# Patient Record
Sex: MALE | Race: WHITE | NOT HISPANIC OR LATINO | Employment: OTHER | URBAN - METROPOLITAN AREA
[De-identification: names, ages, dates, MRNs, and addresses within clinical notes are randomized per-mention and may not be internally consistent; named-entity substitution may affect disease eponyms.]

---

## 2017-03-14 ENCOUNTER — OFFICE VISIT (OUTPATIENT)
Dept: UROLOGY | Facility: CLINIC | Age: 62
End: 2017-03-14
Payer: COMMERCIAL

## 2017-03-14 VITALS
HEIGHT: 71 IN | BODY MASS INDEX: 33.73 KG/M2 | SYSTOLIC BLOOD PRESSURE: 127 MMHG | DIASTOLIC BLOOD PRESSURE: 86 MMHG | WEIGHT: 240.94 LBS | HEART RATE: 55 BPM

## 2017-03-14 DIAGNOSIS — N32.81 OAB (OVERACTIVE BLADDER): ICD-10-CM

## 2017-03-14 DIAGNOSIS — N40.1 BENIGN NON-NODULAR PROSTATIC HYPERPLASIA WITH LOWER URINARY TRACT SYMPTOMS: ICD-10-CM

## 2017-03-14 DIAGNOSIS — R33.9 INCOMPLETE BLADDER EMPTYING: ICD-10-CM

## 2017-03-14 DIAGNOSIS — R35.0 URINARY FREQUENCY: Primary | ICD-10-CM

## 2017-03-14 LAB
BILIRUB SERPL-MCNC: NORMAL MG/DL
BLOOD URINE, POC: NORMAL
COLOR, POC UA: YELLOW
GLUCOSE UR QL STRIP: NORMAL
KETONES UR QL STRIP: NORMAL
LEUKOCYTE ESTERASE URINE, POC: NORMAL
NITRITE, POC UA: NORMAL
PH, POC UA: 6
PROTEIN, POC: NORMAL
SPECIFIC GRAVITY, POC UA: 1.01
UROBILINOGEN, POC UA: NORMAL

## 2017-03-14 PROCEDURE — 99213 OFFICE O/P EST LOW 20 MIN: CPT | Mod: 25,S$GLB,, | Performed by: UROLOGY

## 2017-03-14 PROCEDURE — 3074F SYST BP LT 130 MM HG: CPT | Mod: S$GLB,,, | Performed by: UROLOGY

## 2017-03-14 PROCEDURE — 1160F RVW MEDS BY RX/DR IN RCRD: CPT | Mod: S$GLB,,, | Performed by: UROLOGY

## 2017-03-14 PROCEDURE — 51798 US URINE CAPACITY MEASURE: CPT | Mod: S$GLB,,, | Performed by: UROLOGY

## 2017-03-14 PROCEDURE — 81002 URINALYSIS NONAUTO W/O SCOPE: CPT | Mod: S$GLB,,, | Performed by: UROLOGY

## 2017-03-14 PROCEDURE — 99999 PR PBB SHADOW E&M-EST. PATIENT-LVL II: CPT | Mod: PBBFAC,,, | Performed by: UROLOGY

## 2017-03-14 PROCEDURE — 3079F DIAST BP 80-89 MM HG: CPT | Mod: S$GLB,,, | Performed by: UROLOGY

## 2017-03-14 RX ORDER — CLINDAMYCIN HYDROCHLORIDE 300 MG/1
CAPSULE ORAL
Refills: 0 | COMMUNITY
Start: 2017-03-06 | End: 2017-09-12

## 2017-03-14 RX ORDER — OXYBUTYNIN CHLORIDE 10 MG/1
10 TABLET, EXTENDED RELEASE ORAL DAILY
Qty: 90 TABLET | Refills: 3 | Status: SHIPPED | OUTPATIENT
Start: 2017-03-14 | End: 2017-09-12 | Stop reason: SDUPTHER

## 2017-03-14 NOTE — PROGRESS NOTES
UROLOGY [place]  3 14 17    c-c urinary frequency and urgency    Age 61, visits today with the concern that he is still having some voiding issues.    He started on flomax when he came here three months ago, and feels he has gotten some benefit from it.  His voiding was often and with low volumes, and the stream was somewhat diminished.  We refrained from giving him a bladder relaxant, since his residual was slightly elevated at 236 ml.      BLADDERSCAN ULTRASOUND today 14 ml        IMP  Urinary frequency and urgency, nocturia  Overactive bladder  Sensation of incomplete bladder emptying, but with excellent emptying  bph  I will keep him on the flomax, and I am starting today on ditropan xl 10 mg po qd    RTC 6 mo    If doesn't do well may need cysto, uroflow, repeat bladderscan  Might need proscar

## 2017-04-26 ENCOUNTER — TELEPHONE (OUTPATIENT)
Dept: NEUROSURGERY | Facility: CLINIC | Age: 62
End: 2017-04-26

## 2017-05-22 ENCOUNTER — TELEPHONE (OUTPATIENT)
Dept: FAMILY MEDICINE | Facility: CLINIC | Age: 62
End: 2017-05-22

## 2017-05-22 NOTE — TELEPHONE ENCOUNTER
"----- Message from Yue Mendoza sent at 5/22/2017 12:24 PM CDT -----  Contact: bianca   Needs referral and imaging for "grinding in neck"  Margaret   Call back    "

## 2017-05-22 NOTE — TELEPHONE ENCOUNTER
Attempted to reach patient- no answer- patient will need to be seen by provider- he has not been seen by Dr Ramsay for almost a year. Dr Ramsay is out this week but Opal the NP has openings. Needs to be scheduled.

## 2017-09-12 ENCOUNTER — OFFICE VISIT (OUTPATIENT)
Dept: UROLOGY | Facility: CLINIC | Age: 62
End: 2017-09-12
Payer: COMMERCIAL

## 2017-09-12 VITALS
HEART RATE: 65 BPM | DIASTOLIC BLOOD PRESSURE: 86 MMHG | WEIGHT: 233.69 LBS | BODY MASS INDEX: 32.72 KG/M2 | SYSTOLIC BLOOD PRESSURE: 120 MMHG | HEIGHT: 71 IN

## 2017-09-12 DIAGNOSIS — N40.1 BENIGN NON-NODULAR PROSTATIC HYPERPLASIA WITH LOWER URINARY TRACT SYMPTOMS: ICD-10-CM

## 2017-09-12 DIAGNOSIS — N32.81 OAB (OVERACTIVE BLADDER): ICD-10-CM

## 2017-09-12 DIAGNOSIS — R35.0 URINARY FREQUENCY: ICD-10-CM

## 2017-09-12 DIAGNOSIS — N40.0 BENIGN PROSTATIC HYPERPLASIA, PRESENCE OF LOWER URINARY TRACT SYMPTOMS UNSPECIFIED: Primary | ICD-10-CM

## 2017-09-12 LAB
BILIRUB SERPL-MCNC: NORMAL MG/DL
BLOOD URINE, POC: NORMAL
COLOR, POC UA: YELLOW
GLUCOSE UR QL STRIP: NORMAL
KETONES UR QL STRIP: NORMAL
LEUKOCYTE ESTERASE URINE, POC: NORMAL
NITRITE, POC UA: NORMAL
PH, POC UA: 5
PROTEIN, POC: NORMAL
SPECIFIC GRAVITY, POC UA: 1.02
UROBILINOGEN, POC UA: NORMAL

## 2017-09-12 PROCEDURE — 99214 OFFICE O/P EST MOD 30 MIN: CPT | Mod: 25,S$GLB,, | Performed by: UROLOGY

## 2017-09-12 PROCEDURE — 3079F DIAST BP 80-89 MM HG: CPT | Mod: S$GLB,,, | Performed by: UROLOGY

## 2017-09-12 PROCEDURE — 81002 URINALYSIS NONAUTO W/O SCOPE: CPT | Mod: S$GLB,,, | Performed by: UROLOGY

## 2017-09-12 PROCEDURE — 3008F BODY MASS INDEX DOCD: CPT | Mod: S$GLB,,, | Performed by: UROLOGY

## 2017-09-12 PROCEDURE — 99999 PR PBB SHADOW E&M-EST. PATIENT-LVL III: CPT | Mod: PBBFAC,,, | Performed by: UROLOGY

## 2017-09-12 PROCEDURE — 3074F SYST BP LT 130 MM HG: CPT | Mod: S$GLB,,, | Performed by: UROLOGY

## 2017-09-12 RX ORDER — OXYBUTYNIN CHLORIDE 10 MG/1
10 TABLET, EXTENDED RELEASE ORAL DAILY
Qty: 30 TABLET | Refills: 11 | Status: SHIPPED | OUTPATIENT
Start: 2017-09-12 | End: 2018-06-20

## 2017-09-12 RX ORDER — TAMSULOSIN HYDROCHLORIDE 0.4 MG/1
0.4 CAPSULE ORAL DAILY
Qty: 30 CAPSULE | Refills: 11 | Status: SHIPPED | OUTPATIENT
Start: 2017-09-12 | End: 2017-12-11 | Stop reason: SDUPTHER

## 2017-09-12 NOTE — PROGRESS NOTES
UROLOGY [place]  9 12 17    Urinalysis: col yellow, sg 20, pH 5, leuco -, nitrites -, prot -, glucose -, bili -, blood -    c-c urinary frequency    Age 62, comes in to follow up on urinary frequency. Pt says that since he was put on ditropan 10 mg xl daily the urgency he had has gone away and now he can void with better control. Nocturia x 0-1. No pains or burning on voiding.     Health otherwise fine, with some chronic back problems.         PMH     Surgical:  has a past surgical history that includes Gastric bypass; Liposuction; Cataract extraction w/  intraocular lens implant; Eye surgery; Colonoscopy w/ polypectomy (9/26/2008  [facility]); and Vasectomy.     Medical:  has a past medical history of Arthritis; Colon polyps; and Hypertension.     Familial: father with diabetes, mother with hypertension     Social: lives in [place]. Pt is a contractor            Current Outpatient Prescriptions on File Prior to Visit   Medication Sig Dispense Refill    aspirin 81 MG Chew Take 81 mg by mouth once daily.        diltiazem (CARDIZEM) 120 MG tablet Take 1 tablet (120 mg total) by mouth every 12 (twelve) hours. 180 tablet 3    multivitamin capsule Take 1 capsule by mouth once daily.          Pt alert, oriented, cooperative, no distress  HEENT: wnl.  Neck: supple, no JVD, no lymphadenopathy  Chest: CV NSR  Lungs: normal auscultation  Abdomen flat, nontender, no organomegaly, no masses.  No hernias  Penis noncircumcised, meatus nl  Scrotum slightly distended, bilateral hydroceles, testes palpable and normal  VIDHI: anus nl, sphincter nl tone, mucosa without lesions, prostate 30 gm, symmetric, no nodules or indurations  Extremities: no edema, peripheral pulses nl  Neuro: preserved        IMP  Urinary frequency and urgency, nocturia, well controlled with ditropan xl  bph doing well on flomax  Can be followed yearly

## 2017-12-07 DIAGNOSIS — N40.1 BENIGN NON-NODULAR PROSTATIC HYPERPLASIA WITH LOWER URINARY TRACT SYMPTOMS: ICD-10-CM

## 2017-12-11 DIAGNOSIS — N40.1 BENIGN NON-NODULAR PROSTATIC HYPERPLASIA WITH LOWER URINARY TRACT SYMPTOMS: ICD-10-CM

## 2017-12-11 RX ORDER — TAMSULOSIN HYDROCHLORIDE 0.4 MG/1
0.4 CAPSULE ORAL DAILY
Qty: 30 CAPSULE | Refills: 11 | Status: SHIPPED | OUTPATIENT
Start: 2017-12-11 | End: 2018-06-20

## 2017-12-11 RX ORDER — TAMSULOSIN HYDROCHLORIDE 0.4 MG/1
CAPSULE ORAL
Qty: 90 CAPSULE | Refills: 3 | Status: SHIPPED | OUTPATIENT
Start: 2017-12-11 | End: 2017-12-11

## 2017-12-20 ENCOUNTER — OFFICE VISIT (OUTPATIENT)
Dept: FAMILY MEDICINE | Facility: CLINIC | Age: 62
End: 2017-12-20
Payer: COMMERCIAL

## 2017-12-20 VITALS
OXYGEN SATURATION: 97 % | HEIGHT: 71 IN | TEMPERATURE: 99 F | DIASTOLIC BLOOD PRESSURE: 88 MMHG | BODY MASS INDEX: 34.38 KG/M2 | WEIGHT: 245.56 LBS | HEART RATE: 75 BPM | SYSTOLIC BLOOD PRESSURE: 126 MMHG

## 2017-12-20 DIAGNOSIS — R79.89 LOW SERUM LUTEINIZING HORMONE (LH): ICD-10-CM

## 2017-12-20 DIAGNOSIS — I10 ESSENTIAL HYPERTENSION: ICD-10-CM

## 2017-12-20 DIAGNOSIS — Z00.00 ROUTINE MEDICAL EXAM: Primary | ICD-10-CM

## 2017-12-20 PROCEDURE — 99999 PR PBB SHADOW E&M-EST. PATIENT-LVL III: CPT | Mod: PBBFAC,,, | Performed by: FAMILY MEDICINE

## 2017-12-20 PROCEDURE — 99396 PREV VISIT EST AGE 40-64: CPT | Mod: S$GLB,,, | Performed by: FAMILY MEDICINE

## 2017-12-20 RX ORDER — TESTOSTERONE CYPIONATE 200 MG/ML
140 INJECTION, SOLUTION INTRAMUSCULAR
Qty: 10 ML | Refills: 5
Start: 2017-12-20 | End: 2017-12-20 | Stop reason: SDUPTHER

## 2017-12-20 RX ORDER — METFORMIN HYDROCHLORIDE 500 MG/1
500 TABLET ORAL
Qty: 90 TABLET | Refills: 3
Start: 2017-12-20 | End: 2018-06-20

## 2017-12-20 RX ORDER — TESTOSTERONE CYPIONATE 200 MG/ML
140 INJECTION, SOLUTION INTRAMUSCULAR
Qty: 10 ML | Refills: 5
Start: 2017-12-20 | End: 2018-11-25

## 2017-12-20 NOTE — PROGRESS NOTES
Subjective:       Patient ID: Tex Pickard III is a 62 y.o. male.    Chief Complaint: Annual Exam    HPI     Here for a check up.    Attending a [facility] medical clinic and had labs drawn in 10/2017.  Had a low testosterone of 191 with a low LH of 3.2.  He was started on testosterone injections in November and taking 0.7 ml of 200 mg/ml every week.  Feels better. No more erectile dysfunction.      Also, was found to have an a1c of 5.8%. Started on metformin.     No headaches.  No tunnel vision or blurry vision.       Review of Systems      Review of Systems   Constitutional: Negative for fever and chills.   HENT: Negative for hearing loss and neck stiffness.    Eyes: Negative for redness and itching.   Respiratory: Negative for cough and choking.    Cardiovascular: Negative for chest pain and leg swelling.  Abdomen: Negative for abdominal pain and blood in stool.   Genitourinary: Negative for dysuria and flank pain.   Musculoskeletal: Negative for back pain and gait problem.   Neurological: Negative for light-headedness and headaches.   Hematological: Negative for adenopathy.   Psychiatric/Behavioral: Negative for behavioral problems.     Objective:      Physical Exam   Constitutional: He is oriented to person, place, and time. He appears well-developed and well-nourished.   HENT:   Head: Normocephalic and atraumatic.   Eyes: Conjunctivae are normal. Pupils are equal, round, and reactive to light.   Neck: Normal range of motion. Neck supple.   Cardiovascular: Normal rate, regular rhythm and normal heart sounds.  Exam reveals no friction rub.    No murmur heard.  Pulmonary/Chest: Effort normal and breath sounds normal.   Abdominal: Soft. Bowel sounds are normal. There is no tenderness.   Musculoskeletal: Normal range of motion.   Lymphadenopathy:     He has no cervical adenopathy.   Neurological: He is alert and oriented to person, place, and time. He has normal reflexes. No cranial nerve deficit. Coordination  normal.   Skin: Skin is warm and dry.   Psychiatric: He has a normal mood and affect. His behavior is normal.       Assessment:       1. Routine medical exam    2. Low serum luteinizing hormone (LH)    3. Essential hypertension        Plan:       Routine medical exam  -     Comprehensive metabolic panel; Future; Expected date: 12/20/2017  -     Lipid panel; Future; Expected date: 12/20/2017    Low serum luteinizing hormone (LH)  -     Prolactin; Future; Expected date: 12/20/2017  -     MRI Brain W WO Contrast; Future    Essential hypertension    Other orders  -     metFORMIN (GLUCOPHAGE) 500 MG tablet; Take 1 tablet (500 mg total) by mouth daily with breakfast.  Dispense: 90 tablet; Refill: 3  -     testosterone cypionate (DEPOTESTOTERONE CYPIONATE) 200 mg/mL injection; Inject 0.7 mLs (140 mg total) into the muscle every 14 (fourteen) days.  Dispense: 10 mL; Refill: 5      Plan:  See orders. R/o prolactinoma  Cont current meds        Medication List with Changes/Refills   New Medications    METFORMIN (GLUCOPHAGE) 500 MG TABLET    Take 1 tablet (500 mg total) by mouth daily with breakfast.    TESTOSTERONE CYPIONATE (DEPOTESTOTERONE CYPIONATE) 200 MG/ML INJECTION    Inject 0.7 mLs (140 mg total) into the muscle every 14 (fourteen) days.   Current Medications    ASPIRIN 81 MG CHEW    Take 81 mg by mouth once daily.    DILTIAZEM (CARDIZEM) 120 MG TABLET    Take 1 tablet (120 mg total) by mouth every 12 (twelve) hours.    MULTIVITAMIN CAPSULE    Take 1 capsule by mouth once daily.    OXYBUTYNIN (DITROPAN-XL) 10 MG 24 HR TABLET    Take 1 tablet (10 mg total) by mouth once daily.    OXYMETAZOLINE (AFRIN) 0.05 % NASAL SPRAY    2 sprays by Nasal route every evening.    TAMSULOSIN (FLOMAX) 0.4 MG CP24    Take 1 capsule (0.4 mg total) by mouth once daily.

## 2017-12-21 ENCOUNTER — LAB VISIT (OUTPATIENT)
Dept: LAB | Facility: HOSPITAL | Age: 62
End: 2017-12-21
Attending: FAMILY MEDICINE
Payer: COMMERCIAL

## 2017-12-21 DIAGNOSIS — R79.89 LOW SERUM LUTEINIZING HORMONE (LH): ICD-10-CM

## 2017-12-21 DIAGNOSIS — Z00.00 ROUTINE MEDICAL EXAM: ICD-10-CM

## 2017-12-21 LAB
ALBUMIN SERPL BCP-MCNC: 4 G/DL
ALP SERPL-CCNC: 70 U/L
ALT SERPL W/O P-5'-P-CCNC: 24 U/L
ANION GAP SERPL CALC-SCNC: 7 MMOL/L
AST SERPL-CCNC: 19 U/L
BILIRUB SERPL-MCNC: 0.6 MG/DL
BUN SERPL-MCNC: 14 MG/DL
CALCIUM SERPL-MCNC: 8.5 MG/DL
CHLORIDE SERPL-SCNC: 105 MMOL/L
CHOLEST SERPL-MCNC: 172 MG/DL
CHOLEST/HDLC SERPL: 4.5 {RATIO}
CO2 SERPL-SCNC: 28 MMOL/L
CREAT SERPL-MCNC: 0.8 MG/DL
EST. GFR  (AFRICAN AMERICAN): >60 ML/MIN/1.73 M^2
EST. GFR  (NON AFRICAN AMERICAN): >60 ML/MIN/1.73 M^2
GLUCOSE SERPL-MCNC: 100 MG/DL
HDLC SERPL-MCNC: 38 MG/DL
HDLC SERPL: 22.1 %
LDLC SERPL CALC-MCNC: 111.2 MG/DL
NONHDLC SERPL-MCNC: 134 MG/DL
POTASSIUM SERPL-SCNC: 4.3 MMOL/L
PROLACTIN SERPL IA-MCNC: 4.4 NG/ML
PROT SERPL-MCNC: 7.4 G/DL
SODIUM SERPL-SCNC: 140 MMOL/L
TRIGL SERPL-MCNC: 114 MG/DL

## 2017-12-21 PROCEDURE — 80053 COMPREHEN METABOLIC PANEL: CPT

## 2017-12-21 PROCEDURE — 80061 LIPID PANEL: CPT

## 2017-12-21 PROCEDURE — 84146 ASSAY OF PROLACTIN: CPT

## 2017-12-21 PROCEDURE — 36415 COLL VENOUS BLD VENIPUNCTURE: CPT | Mod: PO

## 2017-12-22 ENCOUNTER — TELEPHONE (OUTPATIENT)
Dept: FAMILY MEDICINE | Facility: CLINIC | Age: 62
End: 2017-12-22

## 2017-12-22 NOTE — TELEPHONE ENCOUNTER
inform pt via phone that I reviewed the test results and note the following:    Your Comprehensive Metabolic Panel which includes electrolytes, Kidney Test, Blood Sugar, and Liver test was reviewed. All results within acceptable range.     Regarding your cholesterol profile, your total cholesterol, LDL(lousy cholesterol that causes plaques), HDL(healthy cholesterol that removes LDL) and triglycerides(when elevated, a risk factor of heart disease and associated with high LDL levels) were reviewed. All results within acceptable range.     Prolactin level was normal.

## 2018-01-08 ENCOUNTER — HOSPITAL ENCOUNTER (OUTPATIENT)
Dept: RADIOLOGY | Facility: HOSPITAL | Age: 63
Discharge: HOME | End: 2018-01-08
Attending: FAMILY MEDICINE
Payer: COMMERCIAL

## 2018-01-08 DIAGNOSIS — R79.89 LOW SERUM LUTEINIZING HORMONE (LH): ICD-10-CM

## 2018-01-08 PROCEDURE — 70553 MRI BRAIN STEM W/O & W/DYE: CPT | Mod: TC,PO

## 2018-01-08 PROCEDURE — 70553 MRI BRAIN STEM W/O & W/DYE: CPT | Mod: 26,,, | Performed by: RADIOLOGY

## 2018-01-08 PROCEDURE — 25500020 PHARM REV CODE 255: Mod: PO | Performed by: FAMILY MEDICINE

## 2018-01-08 PROCEDURE — A9585 GADOBUTROL INJECTION: HCPCS | Mod: PO | Performed by: FAMILY MEDICINE

## 2018-01-08 RX ORDER — GADOBUTROL 604.72 MG/ML
10 INJECTION INTRAVENOUS
Status: COMPLETED | OUTPATIENT
Start: 2018-01-08 | End: 2018-01-08

## 2018-01-08 RX ADMIN — GADOBUTROL 10 ML: 604.72 INJECTION INTRAVENOUS at 03:01

## 2018-02-10 RX ORDER — DILTIAZEM HYDROCHLORIDE 120 MG/1
TABLET, FILM COATED ORAL
Qty: 180 TABLET | Refills: 0 | Status: SHIPPED | OUTPATIENT
Start: 2018-02-10 | End: 2018-05-12 | Stop reason: SDUPTHER

## 2018-03-07 DIAGNOSIS — N32.81 OAB (OVERACTIVE BLADDER): ICD-10-CM

## 2018-03-07 DIAGNOSIS — R35.0 URINARY FREQUENCY: ICD-10-CM

## 2018-03-07 RX ORDER — OXYBUTYNIN CHLORIDE 10 MG/1
TABLET, EXTENDED RELEASE ORAL
Qty: 90 TABLET | Refills: 3 | Status: SHIPPED | OUTPATIENT
Start: 2018-03-07 | End: 2018-06-20

## 2018-05-13 RX ORDER — DILTIAZEM HYDROCHLORIDE 120 MG/1
TABLET, FILM COATED ORAL
Qty: 180 TABLET | Refills: 3 | Status: SHIPPED | OUTPATIENT
Start: 2018-05-13 | End: 2019-01-16 | Stop reason: SDUPTHER

## 2018-06-20 ENCOUNTER — OFFICE VISIT (OUTPATIENT)
Dept: FAMILY MEDICINE | Facility: CLINIC | Age: 63
End: 2018-06-20
Payer: COMMERCIAL

## 2018-06-20 VITALS
RESPIRATION RATE: 12 BRPM | BODY MASS INDEX: 34.63 KG/M2 | HEART RATE: 64 BPM | HEIGHT: 71 IN | DIASTOLIC BLOOD PRESSURE: 78 MMHG | WEIGHT: 247.38 LBS | SYSTOLIC BLOOD PRESSURE: 126 MMHG

## 2018-06-20 DIAGNOSIS — E34.9 TESTOSTERONE DEFICIENCY: ICD-10-CM

## 2018-06-20 DIAGNOSIS — E78.5 HYPERLIPIDEMIA, UNSPECIFIED HYPERLIPIDEMIA TYPE: Primary | ICD-10-CM

## 2018-06-20 PROCEDURE — 99999 PR PBB SHADOW E&M-EST. PATIENT-LVL III: CPT | Mod: PBBFAC,,, | Performed by: FAMILY MEDICINE

## 2018-06-20 PROCEDURE — 3078F DIAST BP <80 MM HG: CPT | Mod: CPTII,S$GLB,, | Performed by: FAMILY MEDICINE

## 2018-06-20 PROCEDURE — 3008F BODY MASS INDEX DOCD: CPT | Mod: CPTII,S$GLB,, | Performed by: FAMILY MEDICINE

## 2018-06-20 PROCEDURE — 99214 OFFICE O/P EST MOD 30 MIN: CPT | Mod: S$GLB,,, | Performed by: FAMILY MEDICINE

## 2018-06-20 PROCEDURE — 3074F SYST BP LT 130 MM HG: CPT | Mod: CPTII,S$GLB,, | Performed by: FAMILY MEDICINE

## 2018-06-20 NOTE — PROGRESS NOTES
Subjective:       Patient ID: Tex Pickard III is a 62 y.o. male.    Chief Complaint: Hypertension (f/u)    HPI     Here for a f/u.    htn controlled.    Attending a [facility] medical clinic and receiving testosterone and hcg.  Reports that he had his testosterone checked last week, Thursday.  Gives himself the testosterone injection weekly.       Review of Systems      Review of Systems   Constitutional: Negative for fever and chills.   HENT: Negative for hearing loss and neck stiffness.    Eyes: Negative for redness and itching.   Respiratory: Negative for cough and choking.    Cardiovascular: Negative for chest pain and leg swelling.  Abdomen: Negative for abdominal pain and blood in stool.   Genitourinary: Negative for dysuria and flank pain.   Musculoskeletal: Negative for back pain and gait problem.   Neurological: Negative for light-headedness and headaches.   Hematological: Negative for adenopathy.   Psychiatric/Behavioral: Negative for behavioral problems.     Objective:      Physical Exam   HENT:   Head: Atraumatic.   Eyes: Conjunctivae are normal. Pupils are equal, round, and reactive to light.   Neck: Normal range of motion.   Cardiovascular: Normal rate and regular rhythm.    No murmur heard.  Pulmonary/Chest: Effort normal and breath sounds normal. He has no wheezes.   Lymphadenopathy:     He has no cervical adenopathy.       Assessment:       1. Hyperlipidemia, unspecified hyperlipidemia type    2. Testosterone deficiency        Plan:       Hyperlipidemia, unspecified hyperlipidemia type  -     Comprehensive metabolic panel; Future; Expected date: 06/20/2018  -     Lipid panel; Future; Expected date: 06/20/2018    Testosterone deficiency  -     CBC auto differential; Future; Expected date: 06/20/2018  -     Testosterone; Future; Expected date: 06/20/2018          Plan:  See orders  Cont current meds      Medication List with Changes/Refills   Current Medications    ASPIRIN 81 MG CHEW    Take 81 mg by  mouth once daily.    DILTIAZEM (CARDIZEM) 120 MG TABLET    TAKE 1 TABLET BY MOUTH EVERY 12 HOURS    MULTIVITAMIN CAPSULE    Take 1 capsule by mouth once daily.    OXYMETAZOLINE (AFRIN) 0.05 % NASAL SPRAY    2 sprays by Nasal route every evening.    TESTOSTERONE CYPIONATE (DEPOTESTOTERONE CYPIONATE) 200 MG/ML INJECTION    Inject 0.7 mLs (140 mg total) into the muscle every 7 days.    UNABLE TO FIND    HCG (LYO) 6,000 IU INJECTABLE EMP: MIX WITH 6ML OF BACTERIOSTATIC WATER.  INJECT 0.5ML OR 50 UNITS ON AN INSULIN SYRINGE (500 IU OF HCG) SUBCUTANEOUSLY TWICE A WEEK   Discontinued Medications    METFORMIN (GLUCOPHAGE) 500 MG TABLET    Take 1 tablet (500 mg total) by mouth daily with breakfast.    OXYBUTYNIN (DITROPAN-XL) 10 MG 24 HR TABLET    Take 1 tablet (10 mg total) by mouth once daily.    OXYBUTYNIN (DITROPAN-XL) 10 MG 24 HR TABLET    TAKE 1 TABLET(10 MG) BY MOUTH EVERY DAY    TAMSULOSIN (FLOMAX) 0.4 MG CP24    Take 1 capsule (0.4 mg total) by mouth once daily.

## 2018-06-21 ENCOUNTER — LAB VISIT (OUTPATIENT)
Dept: LAB | Facility: HOSPITAL | Age: 63
End: 2018-06-21
Attending: FAMILY MEDICINE
Payer: COMMERCIAL

## 2018-06-21 DIAGNOSIS — E34.9 TESTOSTERONE DEFICIENCY: ICD-10-CM

## 2018-06-21 DIAGNOSIS — E78.5 HYPERLIPIDEMIA, UNSPECIFIED HYPERLIPIDEMIA TYPE: ICD-10-CM

## 2018-06-21 LAB
ALBUMIN SERPL BCP-MCNC: 3.6 G/DL
ALP SERPL-CCNC: 69 U/L
ALT SERPL W/O P-5'-P-CCNC: 21 U/L
ANION GAP SERPL CALC-SCNC: 5 MMOL/L
AST SERPL-CCNC: 19 U/L
BASOPHILS # BLD AUTO: 0.04 K/UL
BASOPHILS NFR BLD: 0.9 %
BILIRUB SERPL-MCNC: 0.7 MG/DL
BUN SERPL-MCNC: 11 MG/DL
CALCIUM SERPL-MCNC: 8.7 MG/DL
CHLORIDE SERPL-SCNC: 105 MMOL/L
CHOLEST SERPL-MCNC: 119 MG/DL
CHOLEST/HDLC SERPL: 3.8 {RATIO}
CO2 SERPL-SCNC: 30 MMOL/L
CREAT SERPL-MCNC: 1 MG/DL
DIFFERENTIAL METHOD: ABNORMAL
EOSINOPHIL # BLD AUTO: 0.2 K/UL
EOSINOPHIL NFR BLD: 4.1 %
ERYTHROCYTE [DISTWIDTH] IN BLOOD BY AUTOMATED COUNT: 14.1 %
EST. GFR  (AFRICAN AMERICAN): >60 ML/MIN/1.73 M^2
EST. GFR  (NON AFRICAN AMERICAN): >60 ML/MIN/1.73 M^2
GLUCOSE SERPL-MCNC: 86 MG/DL
HCT VFR BLD AUTO: 41.7 %
HDLC SERPL-MCNC: 31 MG/DL
HDLC SERPL: 26.1 %
HGB BLD-MCNC: 12.8 G/DL
IMM GRANULOCYTES # BLD AUTO: 0.01 K/UL
IMM GRANULOCYTES NFR BLD AUTO: 0.2 %
LDLC SERPL CALC-MCNC: 72.4 MG/DL
LYMPHOCYTES # BLD AUTO: 1.3 K/UL
LYMPHOCYTES NFR BLD: 30.5 %
MCH RBC QN AUTO: 27.3 PG
MCHC RBC AUTO-ENTMCNC: 30.7 G/DL
MCV RBC AUTO: 89 FL
MONOCYTES # BLD AUTO: 0.4 K/UL
MONOCYTES NFR BLD: 8.5 %
NEUTROPHILS # BLD AUTO: 2.4 K/UL
NEUTROPHILS NFR BLD: 55.8 %
NONHDLC SERPL-MCNC: 88 MG/DL
NRBC BLD-RTO: 0 /100 WBC
PLATELET # BLD AUTO: 185 K/UL
PMV BLD AUTO: 12 FL
POTASSIUM SERPL-SCNC: 4.7 MMOL/L
PROT SERPL-MCNC: 6.6 G/DL
RBC # BLD AUTO: 4.69 M/UL
SODIUM SERPL-SCNC: 140 MMOL/L
TESTOST SERPL-MCNC: 1459 NG/DL
TRIGL SERPL-MCNC: 78 MG/DL
WBC # BLD AUTO: 4.36 K/UL

## 2018-06-21 PROCEDURE — 36415 COLL VENOUS BLD VENIPUNCTURE: CPT | Mod: PO

## 2018-06-21 PROCEDURE — 85025 COMPLETE CBC W/AUTO DIFF WBC: CPT

## 2018-06-21 PROCEDURE — 80053 COMPREHEN METABOLIC PANEL: CPT

## 2018-06-21 PROCEDURE — 80061 LIPID PANEL: CPT

## 2018-06-21 PROCEDURE — 84403 ASSAY OF TOTAL TESTOSTERONE: CPT

## 2018-10-05 ENCOUNTER — TELEPHONE (OUTPATIENT)
Dept: GASTROENTEROLOGY | Facility: CLINIC | Age: 63
End: 2018-10-05

## 2018-10-05 NOTE — TELEPHONE ENCOUNTER
----- Message from Catie Humphreys sent at 10/5/2018  4:13 PM CDT -----  Contact: Self  Patient states he is having some stomach issues and needs to get in to see Dr Martinez  No appts available till December   Please call [phone] (home) [phone] (work)

## 2018-10-17 ENCOUNTER — TELEPHONE (OUTPATIENT)
Dept: GASTROENTEROLOGY | Facility: CLINIC | Age: 63
End: 2018-10-17

## 2018-10-17 NOTE — TELEPHONE ENCOUNTER
----- Message from Demetrius Goins LPN sent at 10/16/2018  3:54 PM CDT -----  This pt needs to be rescheduled with [facility] in [place]. Someone checked him in today in the [place] clinic and the pt thought that was where he was scheduled.  Please reschedule pt for [facility].    Thank you

## 2018-10-22 NOTE — PROGRESS NOTES
Subjective:       Patient ID: Tex Pickard III is a 63 y.o. male.    Chief Complaint: Hospital Follow Up (elevated BP); Medication Management (hospital questioned reason for dilitazem; states metformin is giving him diarrhea); and Flu Vaccine (due )    Mr. Pickard is a new patient to me. He presents today for follow up    HPI   Presented to [facility] ED on 10/21/18 with complaints of dizziness, nausea, headache, and elevated BP at home. EKG showed no acute ischemic abnormalities.  Lab studies fairly unremarkable. Normal kidney function. Normal troponin. BP stabilized in ED. DCd home and advised to FU with our team     BP has been stable at home. No further episodes. Denies recent high salt intake. Has been on diltiazem for BP control for years    Prescribed Metformin by Dr. Weiler for high-end normal X4L---ei history of diabetes or prediabetes. Metformin causing diarrhea   Vitals:    10/23/18 1300   BP: 120/74   Pulse: (!) 55   Temp: 98.2 °F (36.8 °C)     Review of Systems   Constitutional: Negative for diaphoresis and fever.   HENT: Negative for facial swelling and trouble swallowing.    Eyes: Negative for discharge and redness.   Respiratory: Negative for cough and shortness of breath.    Cardiovascular: Negative for chest pain and palpitations.   Gastrointestinal: Negative for abdominal pain and diarrhea.   Genitourinary: Negative for difficulty urinating.   Musculoskeletal: Negative for gait problem.   Skin: Negative for rash and wound.   Neurological: Negative for facial asymmetry and speech difficulty.   Psychiatric/Behavioral: Negative for confusion. The patient is not nervous/anxious.        Past Medical History:   Diagnosis Date    Arthritis     Colon polyps     Hypertension      Objective:      Physical Exam   Constitutional: He is oriented to person, place, and time. He does not have a sickly appearance. No distress.   HENT:   Head: Normocephalic.   Right Ear: Hearing normal.   Left Ear: Hearing normal.    Nose: Nose normal.   Eyes: Conjunctivae and lids are normal.   Neck: No JVD present. No tracheal deviation present.   Cardiovascular: Normal rate and normal heart sounds.   Pulmonary/Chest: Effort normal and breath sounds normal.   Musculoskeletal: Normal range of motion. He exhibits no deformity.   Neurological: He is alert and oriented to person, place, and time.   Skin: He is not diaphoretic. No pallor.   Psychiatric: He has a normal mood and affect. His speech is normal and behavior is normal. Judgment and thought content normal. Cognition and memory are normal.   Nursing note and vitals reviewed.      Assessment:       1. Essential hypertension    2. Screening for diabetes mellitus    3. Testosterone deficiency        Plan:       Essential hypertension  -     Comprehensive metabolic panel; Future; Expected date: 12/10/2018  -     TSH; Future; Expected date: 12/10/2018    Screening for diabetes mellitus  -     Hemoglobin A1c; Future; Expected date: 12/10/2018  - Stop metformin; educated on lifestyle adjustments (diabetic diet, exercise, weight loss)    Testosterone deficiency  -     Testosterone; Future; Expected date: 10/23/2018    Other orders  -     Influenza - Quadrivalent (3 years & older) (PF)    continue checking BP at home  Follow-up in about 8 weeks (around 12/20/2018) for routine FU c PCP as scheduled with fasting labs prior, me as needed.       Medication List           Accurate as of 10/23/18  1:30 PM. If you have any questions, ask your nurse or doctor.               CHANGE how you take these medications    testosterone cypionate 200 mg/mL injection  Commonly known as:  DEPOTESTOTERONE CYPIONATE  Inject 0.7 mLs (140 mg total) into the muscle every 7 days.  What changed:  how much to take        CONTINUE taking these medications    aspirin 81 MG Chew     diltiaZEM 120 MG tablet  Commonly known as:  CARDIZEM  TAKE 1 TABLET BY MOUTH EVERY 12 HOURS     multivitamin capsule     oxymetazoline 0.05 %  nasal spray  Commonly known as:  AFRIN     UNABLE TO FIND        STOP taking these medications    metFORMIN 500 MG tablet  Commonly known as:  GLUCOPHAGE  Stopped by:  Opal Gavin, NP

## 2018-10-23 ENCOUNTER — OFFICE VISIT (OUTPATIENT)
Dept: FAMILY MEDICINE | Facility: CLINIC | Age: 63
End: 2018-10-23
Payer: COMMERCIAL

## 2018-10-23 VITALS
BODY MASS INDEX: 34.35 KG/M2 | HEIGHT: 71 IN | HEART RATE: 55 BPM | OXYGEN SATURATION: 98 % | TEMPERATURE: 98 F | SYSTOLIC BLOOD PRESSURE: 120 MMHG | WEIGHT: 245.38 LBS | DIASTOLIC BLOOD PRESSURE: 74 MMHG

## 2018-10-23 DIAGNOSIS — I10 ESSENTIAL HYPERTENSION: Primary | ICD-10-CM

## 2018-10-23 DIAGNOSIS — E34.9 TESTOSTERONE DEFICIENCY: ICD-10-CM

## 2018-10-23 DIAGNOSIS — Z13.1 SCREENING FOR DIABETES MELLITUS: ICD-10-CM

## 2018-10-23 PROCEDURE — 3078F DIAST BP <80 MM HG: CPT | Mod: CPTII,S$GLB,, | Performed by: NURSE PRACTITIONER

## 2018-10-23 PROCEDURE — 90471 IMMUNIZATION ADMIN: CPT | Mod: S$GLB,,, | Performed by: NURSE PRACTITIONER

## 2018-10-23 PROCEDURE — 90686 IIV4 VACC NO PRSV 0.5 ML IM: CPT | Mod: S$GLB,,, | Performed by: NURSE PRACTITIONER

## 2018-10-23 PROCEDURE — 99214 OFFICE O/P EST MOD 30 MIN: CPT | Mod: 25,S$GLB,, | Performed by: NURSE PRACTITIONER

## 2018-10-23 PROCEDURE — 99999 PR PBB SHADOW E&M-EST. PATIENT-LVL III: CPT | Mod: PBBFAC,,, | Performed by: NURSE PRACTITIONER

## 2018-10-23 PROCEDURE — 3074F SYST BP LT 130 MM HG: CPT | Mod: CPTII,S$GLB,, | Performed by: NURSE PRACTITIONER

## 2018-10-23 PROCEDURE — 3008F BODY MASS INDEX DOCD: CPT | Mod: CPTII,S$GLB,, | Performed by: NURSE PRACTITIONER

## 2018-10-23 RX ORDER — METFORMIN HYDROCHLORIDE 500 MG/1
500 TABLET ORAL
COMMUNITY
End: 2018-10-23 | Stop reason: ALTCHOICE

## 2018-11-19 ENCOUNTER — TELEPHONE (OUTPATIENT)
Dept: FAMILY MEDICINE | Facility: CLINIC | Age: 63
End: 2018-11-19

## 2018-11-19 NOTE — TELEPHONE ENCOUNTER
----- Message from Eliceo Edy sent at 11/19/2018 11:59 AM CST -----  Type:  Sooner Apoointment Request    Caller is requesting a sooner appointment.  Caller declined first available appointment listed below.  Caller will not accept being placed on the waitlist and is requesting a message be sent to doctor.    Name of Caller:  Patient   When is the first available appointment?  12/20/18  Symptoms:  High blood pressure   Best Call Back Number:  [phone]  Additional Information:  Patients blood pressure keeps spiking. Patient cannot wait until the 12/20/18 appointment at 7:40. Patient has been in the ER due to high blood pressure. Please call patient to schedule and advise.

## 2018-11-21 ENCOUNTER — OFFICE VISIT (OUTPATIENT)
Dept: FAMILY MEDICINE | Facility: CLINIC | Age: 63
End: 2018-11-21
Payer: COMMERCIAL

## 2018-11-21 VITALS
WEIGHT: 248.88 LBS | HEIGHT: 71 IN | SYSTOLIC BLOOD PRESSURE: 138 MMHG | HEART RATE: 62 BPM | BODY MASS INDEX: 34.84 KG/M2 | DIASTOLIC BLOOD PRESSURE: 82 MMHG | OXYGEN SATURATION: 98 % | TEMPERATURE: 99 F

## 2018-11-21 DIAGNOSIS — I10 HYPERTENSION, UNSPECIFIED TYPE: Primary | ICD-10-CM

## 2018-11-21 DIAGNOSIS — R73.02 IMPAIRED GLUCOSE TOLERANCE: ICD-10-CM

## 2018-11-21 DIAGNOSIS — E34.9 TESTOSTERONE DEFICIENCY: ICD-10-CM

## 2018-11-21 PROCEDURE — 99999 PR PBB SHADOW E&M-EST. PATIENT-LVL III: CPT | Mod: PBBFAC,,, | Performed by: FAMILY MEDICINE

## 2018-11-21 PROCEDURE — 3075F SYST BP GE 130 - 139MM HG: CPT | Mod: CPTII,S$GLB,, | Performed by: FAMILY MEDICINE

## 2018-11-21 PROCEDURE — 99214 OFFICE O/P EST MOD 30 MIN: CPT | Mod: S$GLB,,, | Performed by: FAMILY MEDICINE

## 2018-11-21 PROCEDURE — 3008F BODY MASS INDEX DOCD: CPT | Mod: CPTII,S$GLB,, | Performed by: FAMILY MEDICINE

## 2018-11-21 PROCEDURE — 3079F DIAST BP 80-89 MM HG: CPT | Mod: CPTII,S$GLB,, | Performed by: FAMILY MEDICINE

## 2018-11-21 RX ORDER — OLMESARTAN MEDOXOMIL 20 MG/1
20 TABLET ORAL DAILY
Qty: 30 TABLET | Refills: 5 | Status: SHIPPED | OUTPATIENT
Start: 2018-11-21 | End: 2019-01-16 | Stop reason: SDUPTHER

## 2018-11-21 NOTE — PROGRESS NOTES
Subjective:       Patient ID: Tex Pickard III is a 63 y.o. male.    Chief Complaint: Hypertension (fluctuating; asking about alternative or change in dosage); Nausea; and Fatigue    HPI     Here with partner.    Intermittent elevated bp over the past 4-6 weeks    Reports increasing his testosterone injections from 0.7 ml(140 mg) from once a week to twice a week over the past 2-3 months. As result, c/o nausea, irritability, high bp and fatigue.        Review of Systems      Review of Systems   Constitutional: Negative for fever and chills.   HENT: Negative for hearing loss and neck stiffness.    Eyes: Negative for redness and itching.   Respiratory: Negative for cough and choking.    Cardiovascular: Negative for chest pain and leg swelling.  Abdomen: Negative for abdominal pain and blood in stool.   Genitourinary: Negative for dysuria and flank pain.   Musculoskeletal: Negative for back pain and gait problem.   Neurological: Negative for light-headedness and headaches.   Hematological: Negative for adenopathy.   Psychiatric/Behavioral: Negative for behavioral problems.     Objective:      Physical Exam   HENT:   Head: Atraumatic.   Eyes: Conjunctivae are normal. Pupils are equal, round, and reactive to light.   Neck: Normal range of motion.   Cardiovascular: Normal rate and regular rhythm.   No murmur heard.  Pulmonary/Chest: Effort normal and breath sounds normal. He has no wheezes.   Lymphadenopathy:     He has no cervical adenopathy.       Assessment:       1. Hypertension, unspecified type    2. Testosterone deficiency    3. Impaired glucose tolerance        Plan:       Hypertension, unspecified type  -     Comprehensive metabolic panel; Future; Expected date: 11/21/2018  -     CBC auto differential; Future; Expected date: 11/21/2018  -     TSH; Future; Expected date: 11/21/2018    Testosterone deficiency  -     Testosterone; Future; Expected date: 11/21/2018    Impaired glucose tolerance  -     Hemoglobin  A1c; Future; Expected date: 11/21/2018    Other orders  -     olmesartan (BENICAR) 20 MG tablet; Take 1 tablet (20 mg total) by mouth once daily.  Dispense: 30 tablet; Refill: 5  -     testosterone cypionate (DEPOTESTOTERONE CYPIONATE) 200 mg/mL injection; Inject 0.7 mLs (140 mg total) into the muscle twice a week.  Dispense: 10 mL; Refill: 5            Plan:  Start benicar for bp control. Serial bp checks  See orders. If testosterone level is elevated, then will recommend that he decrease his dosage.           Medication List           Accurate as of 11/21/18 11:59 PM. If you have any questions, ask your nurse or doctor.               START taking these medications    olmesartan 20 MG tablet  Commonly known as:  BENICAR  Take 1 tablet (20 mg total) by mouth once daily.  Started by:  Robert Ramsay MD        CHANGE how you take these medications    testosterone cypionate 200 mg/mL injection  Commonly known as:  DEPOTESTOTERONE CYPIONATE  Inject 0.7 mLs (140 mg total) into the muscle twice a week.  Start taking on:  11/26/2018  What changed:  when to take this  Changed by:  Robert Ramsay MD        CONTINUE taking these medications    aspirin 81 MG Chew     diltiaZEM 120 MG tablet  Commonly known as:  CARDIZEM  TAKE 1 TABLET BY MOUTH EVERY 12 HOURS     multivitamin capsule     oxymetazoline 0.05 % nasal spray  Commonly known as:  AFRIN     UNABLE TO FIND           Where to Get Your Medications      [facility] [address] [place] [address] AT [facility] [address] [address]    Phone:  [phone]   · olmesartan 20 MG tablet     Information about where to get these medications is not yet available    Ask your nurse or doctor about these medications  · testosterone cypionate 200 mg/mL injection

## 2018-11-25 RX ORDER — TESTOSTERONE CYPIONATE 200 MG/ML
140 INJECTION, SOLUTION INTRAMUSCULAR
Qty: 10 ML | Refills: 5
Start: 2018-11-26 | End: 2018-12-20

## 2018-11-26 ENCOUNTER — LAB VISIT (OUTPATIENT)
Dept: LAB | Facility: HOSPITAL | Age: 63
End: 2018-11-26
Attending: FAMILY MEDICINE
Payer: COMMERCIAL

## 2018-11-26 DIAGNOSIS — I10 ESSENTIAL HYPERTENSION: ICD-10-CM

## 2018-11-26 DIAGNOSIS — Z13.1 SCREENING FOR DIABETES MELLITUS: ICD-10-CM

## 2018-11-26 DIAGNOSIS — I10 HYPERTENSION, UNSPECIFIED TYPE: ICD-10-CM

## 2018-11-26 DIAGNOSIS — E34.9 TESTOSTERONE DEFICIENCY: ICD-10-CM

## 2018-11-26 LAB
ALBUMIN SERPL BCP-MCNC: 3.8 G/DL
ALBUMIN SERPL BCP-MCNC: 3.8 G/DL
ALP SERPL-CCNC: 72 U/L
ALP SERPL-CCNC: 72 U/L
ALT SERPL W/O P-5'-P-CCNC: 21 U/L
ALT SERPL W/O P-5'-P-CCNC: 21 U/L
ANION GAP SERPL CALC-SCNC: 9 MMOL/L
ANION GAP SERPL CALC-SCNC: 9 MMOL/L
AST SERPL-CCNC: 21 U/L
AST SERPL-CCNC: 21 U/L
BASOPHILS # BLD AUTO: 0.05 K/UL
BASOPHILS NFR BLD: 1.1 %
BILIRUB SERPL-MCNC: 0.6 MG/DL
BILIRUB SERPL-MCNC: 0.6 MG/DL
BUN SERPL-MCNC: 10 MG/DL
BUN SERPL-MCNC: 10 MG/DL
CALCIUM SERPL-MCNC: 8.9 MG/DL
CALCIUM SERPL-MCNC: 8.9 MG/DL
CHLORIDE SERPL-SCNC: 104 MMOL/L
CHLORIDE SERPL-SCNC: 104 MMOL/L
CO2 SERPL-SCNC: 27 MMOL/L
CO2 SERPL-SCNC: 27 MMOL/L
CREAT SERPL-MCNC: 1 MG/DL
CREAT SERPL-MCNC: 1 MG/DL
DIFFERENTIAL METHOD: ABNORMAL
EOSINOPHIL # BLD AUTO: 0.3 K/UL
EOSINOPHIL NFR BLD: 5.3 %
ERYTHROCYTE [DISTWIDTH] IN BLOOD BY AUTOMATED COUNT: 14.4 %
EST. GFR  (AFRICAN AMERICAN): >60 ML/MIN/1.73 M^2
EST. GFR  (AFRICAN AMERICAN): >60 ML/MIN/1.73 M^2
EST. GFR  (NON AFRICAN AMERICAN): >60 ML/MIN/1.73 M^2
EST. GFR  (NON AFRICAN AMERICAN): >60 ML/MIN/1.73 M^2
ESTIMATED AVG GLUCOSE: 114 MG/DL
GLUCOSE SERPL-MCNC: 140 MG/DL
GLUCOSE SERPL-MCNC: 140 MG/DL
HBA1C MFR BLD HPLC: 5.6 %
HCT VFR BLD AUTO: 45 %
HGB BLD-MCNC: 13.7 G/DL
IMM GRANULOCYTES # BLD AUTO: 0 K/UL
IMM GRANULOCYTES NFR BLD AUTO: 0 %
LYMPHOCYTES # BLD AUTO: 1.4 K/UL
LYMPHOCYTES NFR BLD: 30.2 %
MCH RBC QN AUTO: 27.5 PG
MCHC RBC AUTO-ENTMCNC: 30.4 G/DL
MCV RBC AUTO: 90 FL
MONOCYTES # BLD AUTO: 0.3 K/UL
MONOCYTES NFR BLD: 5.7 %
NEUTROPHILS # BLD AUTO: 2.7 K/UL
NEUTROPHILS NFR BLD: 57.7 %
NRBC BLD-RTO: 0 /100 WBC
PLATELET # BLD AUTO: 231 K/UL
PMV BLD AUTO: 11.6 FL
POTASSIUM SERPL-SCNC: 4.2 MMOL/L
POTASSIUM SERPL-SCNC: 4.2 MMOL/L
PROT SERPL-MCNC: 7.2 G/DL
PROT SERPL-MCNC: 7.2 G/DL
RBC # BLD AUTO: 4.98 M/UL
SODIUM SERPL-SCNC: 140 MMOL/L
SODIUM SERPL-SCNC: 140 MMOL/L
TESTOST SERPL-MCNC: 1092 NG/DL
TSH SERPL DL<=0.005 MIU/L-ACNC: 1.96 UIU/ML
WBC # BLD AUTO: 4.73 K/UL

## 2018-11-26 PROCEDURE — 80053 COMPREHEN METABOLIC PANEL: CPT

## 2018-11-26 PROCEDURE — 84403 ASSAY OF TOTAL TESTOSTERONE: CPT

## 2018-11-26 PROCEDURE — 85025 COMPLETE CBC W/AUTO DIFF WBC: CPT

## 2018-11-26 PROCEDURE — 84153 ASSAY OF PSA TOTAL: CPT

## 2018-11-26 PROCEDURE — 84443 ASSAY THYROID STIM HORMONE: CPT

## 2018-11-26 PROCEDURE — 36415 COLL VENOUS BLD VENIPUNCTURE: CPT | Mod: PO

## 2018-11-26 PROCEDURE — 83036 HEMOGLOBIN GLYCOSYLATED A1C: CPT

## 2018-11-27 ENCOUNTER — PATIENT MESSAGE (OUTPATIENT)
Dept: FAMILY MEDICINE | Facility: CLINIC | Age: 63
End: 2018-11-27

## 2018-11-27 DIAGNOSIS — I10 ESSENTIAL HYPERTENSION: Primary | ICD-10-CM

## 2018-11-27 DIAGNOSIS — Z12.5 ENCOUNTER FOR SCREENING FOR MALIGNANT NEOPLASM OF PROSTATE: ICD-10-CM

## 2018-11-27 DIAGNOSIS — R73.9 HYPERGLYCEMIA: ICD-10-CM

## 2018-12-10 ENCOUNTER — LAB VISIT (OUTPATIENT)
Dept: LAB | Facility: HOSPITAL | Age: 63
End: 2018-12-10
Attending: FAMILY MEDICINE
Payer: COMMERCIAL

## 2018-12-10 DIAGNOSIS — Z12.5 ENCOUNTER FOR SCREENING FOR MALIGNANT NEOPLASM OF PROSTATE: ICD-10-CM

## 2018-12-10 DIAGNOSIS — Z12.5 ENCOUNTER FOR SCREENING FOR MALIGNANT NEOPLASM OF PROSTATE: Primary | ICD-10-CM

## 2018-12-10 LAB — COMPLEXED PSA SERPL-MCNC: 1.7 NG/ML

## 2018-12-10 PROCEDURE — 84153 ASSAY OF PSA TOTAL: CPT

## 2018-12-10 PROCEDURE — 36415 COLL VENOUS BLD VENIPUNCTURE: CPT | Mod: PO

## 2018-12-10 NOTE — TELEPHONE ENCOUNTER
Godfreyt message from Opal and lab result note from Dr. Ramsay have been given to pt via phone. Verbalized understanding.  Pt did stop his testosterone for 1 week, to help lower his own result, after he saw it on portal.  Instructed to take/self administer all medications as ordered unless discussed with provider to change. Verbalized understanding.

## 2018-12-20 ENCOUNTER — OFFICE VISIT (OUTPATIENT)
Dept: FAMILY MEDICINE | Facility: CLINIC | Age: 63
End: 2018-12-20
Payer: COMMERCIAL

## 2018-12-20 VITALS
WEIGHT: 252 LBS | OXYGEN SATURATION: 97 % | DIASTOLIC BLOOD PRESSURE: 86 MMHG | SYSTOLIC BLOOD PRESSURE: 138 MMHG | BODY MASS INDEX: 35.14 KG/M2 | HEART RATE: 82 BPM

## 2018-12-20 DIAGNOSIS — I10 HYPERTENSION, UNSPECIFIED TYPE: Primary | ICD-10-CM

## 2018-12-20 DIAGNOSIS — E34.9 TESTOSTERONE DEFICIENCY: ICD-10-CM

## 2018-12-20 DIAGNOSIS — R41.3 SHORT-TERM MEMORY LOSS: ICD-10-CM

## 2018-12-20 PROCEDURE — 99214 OFFICE O/P EST MOD 30 MIN: CPT | Mod: S$GLB,,, | Performed by: FAMILY MEDICINE

## 2018-12-20 PROCEDURE — 3008F BODY MASS INDEX DOCD: CPT | Mod: CPTII,S$GLB,, | Performed by: FAMILY MEDICINE

## 2018-12-20 PROCEDURE — 3075F SYST BP GE 130 - 139MM HG: CPT | Mod: CPTII,S$GLB,, | Performed by: FAMILY MEDICINE

## 2018-12-20 PROCEDURE — 99999 PR PBB SHADOW E&M-EST. PATIENT-LVL III: CPT | Mod: PBBFAC,,, | Performed by: FAMILY MEDICINE

## 2018-12-20 PROCEDURE — 3079F DIAST BP 80-89 MM HG: CPT | Mod: CPTII,S$GLB,, | Performed by: FAMILY MEDICINE

## 2018-12-20 RX ORDER — TESTOSTERONE CYPIONATE 200 MG/ML
INJECTION, SOLUTION INTRAMUSCULAR
COMMUNITY
End: 2018-12-20

## 2018-12-20 RX ORDER — OXYBUTYNIN CHLORIDE 10 MG/1
TABLET, EXTENDED RELEASE ORAL
Refills: 0 | COMMUNITY
Start: 2018-12-05 | End: 2019-06-11 | Stop reason: SDUPTHER

## 2018-12-20 RX ORDER — TESTOSTERONE CYPIONATE 200 MG/ML
100 INJECTION, SOLUTION INTRAMUSCULAR
Qty: 10 ML | Refills: 5
Start: 2018-12-20 | End: 2023-04-17

## 2018-12-20 NOTE — PROGRESS NOTES
Subjective:       Patient ID: Tex Pickard III is a 63 y.o. male.    Chief Complaint: Follow-up (6 mo. Discuss labs )    HPI     Here for a f/u.    Since addition of benicar approx 4 weeks ago, his bp has stabilized with bp at home: 130's/80's.      Recall that he started his testosterone injections approx 15 months ago.      Reports short term memory loss x 1 year.  Getting worse.  Able to perform his iadls.  No loss of smell. No problems with navigation in vehicle.       Review of Systems      Review of Systems   Constitutional: Negative for fever and chills.   HENT: Negative for hearing loss and neck stiffness.    Eyes: Negative for redness and itching.   Respiratory: Negative for cough and choking.    Cardiovascular: Negative for chest pain and leg swelling.  Abdomen: Negative for abdominal pain and blood in stool.   Genitourinary: Negative for dysuria and flank pain.   Musculoskeletal: Negative for back pain and gait problem.   Neurological: Negative for light-headedness and headaches.   Hematological: Negative for adenopathy.   Psychiatric/Behavioral: Negative for behavioral problems.       Objective:      Physical Exam   HENT:   Head: Atraumatic.   Eyes: Conjunctivae are normal. Pupils are equal, round, and reactive to light.   Neck: Normal range of motion.   Cardiovascular: Normal rate and regular rhythm.   No murmur heard.  Pulmonary/Chest: Effort normal and breath sounds normal. He has no wheezes.   Lymphadenopathy:     He has no cervical adenopathy.       Assessment:       1. Hypertension, unspecified type    2. Short-term memory loss    3. Testosterone deficiency        Plan:       Hypertension, unspecified type   -stable    Short-term memory loss  -     Ambulatory referral to Neurology   ? Relation to testosterone injections.  Family hx of dementia.      Testosterone deficiency   -dec testosterone inj to 0.5 mg once every 2 weeks.             Plan:  See referral       Medication List            Accurate as of 12/20/18  9:55 AM. If you have any questions, ask your nurse or doctor.               CHANGE how you take these medications    testosterone cypionate 200 mg/mL injection  Commonly known as:  DEPOTESTOTERONE CYPIONATE  Inject 0.5 mLs (100 mg total) into the muscle every 14 (fourteen) days.  What changed:    · how much to take  · when to take this  · Another medication with the same name was removed. Continue taking this medication, and follow the directions you see here.  Changed by:  Robert Ramsay MD        CONTINUE taking these medications    aspirin 81 MG Chew     diltiaZEM 120 MG tablet  Commonly known as:  CARDIZEM  TAKE 1 TABLET BY MOUTH EVERY 12 HOURS     multivitamin capsule     olmesartan 20 MG tablet  Commonly known as:  BENICAR  Take 1 tablet (20 mg total) by mouth once daily.     oxybutynin 10 MG 24 hr tablet  Commonly known as:  DITROPAN-XL     oxymetazoline 0.05 % nasal spray  Commonly known as:  AFRIN     UNABLE TO FIND           Where to Get Your Medications      Information about where to get these medications is not yet available    Ask your nurse or doctor about these medications  · testosterone cypionate 200 mg/mL injection

## 2019-01-18 ENCOUNTER — OFFICE VISIT (OUTPATIENT)
Dept: NEUROLOGY | Facility: CLINIC | Age: 64
End: 2019-01-18
Payer: COMMERCIAL

## 2019-01-18 VITALS
WEIGHT: 250.88 LBS | RESPIRATION RATE: 20 BRPM | HEIGHT: 71 IN | BODY MASS INDEX: 35.12 KG/M2 | HEART RATE: 73 BPM | DIASTOLIC BLOOD PRESSURE: 80 MMHG | SYSTOLIC BLOOD PRESSURE: 152 MMHG

## 2019-01-18 DIAGNOSIS — Z98.84 H/O GASTRIC BYPASS: ICD-10-CM

## 2019-01-18 DIAGNOSIS — R41.3 MEMORY IMPAIRMENT: Primary | ICD-10-CM

## 2019-01-18 DIAGNOSIS — Z86.39 H/O NON ANEMIC VITAMIN B12 DEFICIENCY: ICD-10-CM

## 2019-01-18 DIAGNOSIS — R42 EPISODE OF DIZZINESS: ICD-10-CM

## 2019-01-18 DIAGNOSIS — I10 HYPERTENSION, UNSPECIFIED TYPE: ICD-10-CM

## 2019-01-18 PROCEDURE — 3008F BODY MASS INDEX DOCD: CPT | Mod: CPTII,S$GLB,, | Performed by: PSYCHIATRY & NEUROLOGY

## 2019-01-18 PROCEDURE — 3077F PR MOST RECENT SYSTOLIC BLOOD PRESSURE >= 140 MM HG: ICD-10-PCS | Mod: CPTII,S$GLB,, | Performed by: PSYCHIATRY & NEUROLOGY

## 2019-01-18 PROCEDURE — 3077F SYST BP >= 140 MM HG: CPT | Mod: CPTII,S$GLB,, | Performed by: PSYCHIATRY & NEUROLOGY

## 2019-01-18 PROCEDURE — 99205 PR OFFICE/OUTPT VISIT, NEW, LEVL V, 60-74 MIN: ICD-10-PCS | Mod: S$GLB,,, | Performed by: PSYCHIATRY & NEUROLOGY

## 2019-01-18 PROCEDURE — 3079F DIAST BP 80-89 MM HG: CPT | Mod: CPTII,S$GLB,, | Performed by: PSYCHIATRY & NEUROLOGY

## 2019-01-18 PROCEDURE — 99205 OFFICE O/P NEW HI 60 MIN: CPT | Mod: S$GLB,,, | Performed by: PSYCHIATRY & NEUROLOGY

## 2019-01-18 PROCEDURE — 99999 PR PBB SHADOW E&M-EST. PATIENT-LVL IV: ICD-10-PCS | Mod: PBBFAC,,, | Performed by: PSYCHIATRY & NEUROLOGY

## 2019-01-18 PROCEDURE — 3079F PR MOST RECENT DIASTOLIC BLOOD PRESSURE 80-89 MM HG: ICD-10-PCS | Mod: CPTII,S$GLB,, | Performed by: PSYCHIATRY & NEUROLOGY

## 2019-01-18 PROCEDURE — 99999 PR PBB SHADOW E&M-EST. PATIENT-LVL IV: CPT | Mod: PBBFAC,,, | Performed by: PSYCHIATRY & NEUROLOGY

## 2019-01-18 PROCEDURE — 3008F PR BODY MASS INDEX (BMI) DOCUMENTED: ICD-10-PCS | Mod: CPTII,S$GLB,, | Performed by: PSYCHIATRY & NEUROLOGY

## 2019-01-18 NOTE — LETTER
January 18, 2019      Robert Ramsay MD  [address] [facility] [address] [place] [address]           [facility]  [address] [facility] [address] [place] [address]  Phone: [phone]  Fax: [phone]          Patient: Tex Pickard III   MR Number: [id number]   YOB: 1955   Date of Visit: 1/18/2019       Dear Dr. Robert Ramsay:    Thank you for referring Tex Pickard to me for evaluation. Attached you will find relevant portions of my assessment and plan of care.    If you have questions, please do not hesitate to call me. I look forward to following Tex Pickard along with you.    Sincerely,    Amairani Espinosa MD    Enclosure  CC:  No Recipients    If you would like to receive this communication electronically, please contact [email or web address] or [phone] to request more information on [employer] Link access.    For providers and/or their staff who would like to refer a patient to [facility], please contact us through our one-stop-shop provider referral line, [facility], at [phone].    If you feel you have received this communication in error or would no longer like to receive these types of communications, please e-mail [email or web address]

## 2019-01-18 NOTE — PROGRESS NOTES
"    Date: 1/18/2019    Patient ID: Tex Pickard III is a 63 y.o. male.    Referring Provider:  Robert Ramsay MD    Chief Complaint: Memory Loss      History of Present Illness:  Mr. Pickard is a 63 y.o. male who presents referred by Robert Ramsay MD today for evaluation of memory trouble. The patient was accompanied by his wife who also contributed to the following history.     He has noticed trouble with the memory for the past 1-1.5 years. He finds that he has trouble remembering things. He feels "fuzzy" in his memory at times. Last time he saw Dr. Ramsay he couldn't remember why he was there. 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 No trouble remembering names of people.     He has stopped sweet and lo (was eating 15 per day). He finds that is helping. He is interested in medication for the memory. His wife notes that he is inattentive. He runs a big company. He finds he can focus well and tune out everything off. He is successful at work and keeps up with things. His wife things it is busy-ness that is contributing to the memory. He likes to do sodFastclicko.     His mother is 82 and in the past 5 years or more she has short term memory trouble. Per his wife, she thinks it was even longer than that.     He has been on oxybutynin about 2 years. His BP has fluctuated throughout the years. He was dizziness, stumbling one Saturday and his BP was 180/110. BP came down.     Review of Systems:   Comprehensive review of systems is negative except as mentioned in the HPI    Allergies:  Review of patient's allergies indicates:   Allergen Reactions    Cefaclor Hives     ceclor    Codeine Hives       Current Medications:  Current Outpatient Medications   Medication Sig Dispense Refill    aspirin 81 MG Chew Take 81 mg by mouth once daily.      diltiaZEM (CARDIZEM) 120 MG tablet TAKE 1 TABLET BY MOUTH EVERY 12 HOURS 180 tablet 3    multivitamin capsule Take 1 capsule by mouth once daily.      olmesartan (BENICAR) 20 " "MG tablet Take 1 tablet (20 mg total) by mouth once daily. 30 tablet 5    oxybutynin (DITROPAN-XL) 10 MG 24 hr tablet   0    oxymetazoline (AFRIN) 0.05 % nasal spray 2 sprays by Nasal route every evening.      testosterone cypionate (DEPOTESTOTERONE CYPIONATE) 200 mg/mL injection Inject 0.5 mLs (100 mg total) into the muscle every 14 (fourteen) days. 10 mL 5    UNABLE TO FIND HCG (LYO) 6,000 IU INJECTABLE EMP: MIX WITH 6ML OF BACTERIOSTATIC WATER.  INJECT 0.5ML OR 50 UNITS ON AN INSULIN SYRINGE (500 IU OF HCG) SUBCUTANEOUSLY TWICE A WEEK       No current facility-administered medications for this visit.        Past Medical History:  Past Medical History:   Diagnosis Date    Arthritis     Colon polyps     Hypertension        Past Surgical History:  Past Surgical History:   Procedure Laterality Date    CATARACT EXTRACTION W/  INTRAOCULAR LENS IMPLANT      curt    COLONOSCOPY N/A 4/14/2014    [facility] To [facility]    COLONOSCOPY W/ POLYPECTOMY  9/26/2008  Juan    One 1 to 2 mm polyp in the hepatic flexure.  TUBULAR ADENOMA.   Redundant colon.   Otherwise, the colon and term ileum is normal.     EYE SURGERY      cataract OU    EYE SURGERY Bilateral     eye lid    GASTRIC BYPASS      LIPOSUCTION  2011    plastic surgery - abdominal dermolipectomy ("tummy tuck")    VASECTOMY         Family History:  family history includes Dementia in his mother; Diabetes in his father; Hypertension in his mother.    Social History:   reports that he has quit smoking. He has quit using smokeless tobacco. He reports that he does not drink alcohol or use drugs.    Physical Exam:  Vitals:    01/18/19 1404   BP: (!) 152/80   Pulse: 73   Resp: 20   Weight: 113.8 kg (250 lb 14.4 oz)   Height: 5' 11" (1.803 m)   PainSc: 0-No pain     Body mass index is 34.99 kg/m².  General: Well developed, well nourished.  No acute distress.  Eyes: no ocular hemorrhages  Musculoskeletal: No obvious joint deformities, " moves all extremities well.  Peripheral vascular: No edema noted    Neurological Exam:  Mental status: Awake, alert, and oriented to person, place, and time. MOCA 25/30 (missing 4/5 on recall, see below for details). Recent and remote memory appear to be intact. Attention, concentration, and fund of knowledge regarding recent events normal.   Speech/Language: No dysarthria or aphasia on conversation.   Cranial nerves: Visual fields full. Pupils equal round and reactive to light, extraocular movements intact, facial strength and sensation intact bilaterally, palate and tongue midline, hearing grossly intact bilaterally. Shoulder shrug normal bilaterally.   Motor: 5 out of 5 strength throughout the upper and lower extremities bilaterally. Normal bulk and tone.   Sensation: Intact to light touch and vibration bilaterally.  DTR: 2+ at the knees and biceps bilaterally.  Coordination: Finger-nose-finger testing and rapid alternating movements normal bilaterally. No tremor.   Gait: Normal gait including heel, toe, and tandem walking            Data:  I have personally reviewed the referring provider's notes, labs, & imaging made available to me today.       Labs:  CBC:   Lab Results   Component Value Date    WBC 4.73 11/26/2018    HGB 13.7 (L) 11/26/2018    HCT 45.0 11/26/2018     11/26/2018    MCV 90 11/26/2018    RDW 14.4 11/26/2018     BMP:   Lab Results   Component Value Date     11/26/2018     11/26/2018    K 4.2 11/26/2018    K 4.2 11/26/2018     11/26/2018     11/26/2018    CO2 27 11/26/2018    CO2 27 11/26/2018    BUN 10 11/26/2018    BUN 10 11/26/2018    CREATININE 1.0 11/26/2018    CREATININE 1.0 11/26/2018     (H) 11/26/2018     (H) 11/26/2018    CALCIUM 8.9 11/26/2018    CALCIUM 8.9 11/26/2018    MG 2.1 10/21/2018     LFTS;   Lab Results   Component Value Date    PROT 7.2 11/26/2018    PROT 7.2 11/26/2018    ALBUMIN 3.8 11/26/2018    ALBUMIN 3.8 11/26/2018    BILITOT  0.6 11/26/2018    BILITOT 0.6 11/26/2018    AST 21 11/26/2018    AST 21 11/26/2018    ALKPHOS 72 11/26/2018    ALKPHOS 72 11/26/2018    ALT 21 11/26/2018    ALT 21 11/26/2018     COAGS: No results found for: INR, PROTIME, PTT  FLP:   Lab Results   Component Value Date    CHOL 119 (L) 06/21/2018    HDL 31 (L) 06/21/2018    LDLCALC 72.4 06/21/2018    TRIG 78 06/21/2018    CHOLHDL 26.1 06/21/2018         Imaging:  I have personally reviewed the imaging, MRI brain from Jan 2018 shows minimal atrophy. No strokes.     Assessment and Plan:  Mr. Pickard is a 63 y.o. male referred to me by Robert Ramsay MD for evaluation of memory trouble.     His MOCA score is abnormal, mostly with recall but intact visuospatial abilities. I would like to obtain labs looking for secondary causes, MRI brain since his last was before the onset of the memory troubles, and formal neuropsych testing. We discussed MCI vs normal aging vs distraction/inattention. He has a history of B12 deficiency in the past and a history of gastric bypass.     His BP is high today and he is continuing to work with PCP to bring that down.     He had an episode of dizziness/lightheadedness in Oct 2018 that was felt to be BP related but we will obtain carotid US.     Memory impairment  -     Vitamin B12; Future; Expected date: 01/18/2019  -     Neuropsychological testing; Future; Expected date: 01/19/2019  -     VITAMIN D; Future; Expected date: 01/18/2019  -     FOLATE; Future; Expected date: 01/18/2019  -     VITAMIN E; Future; Expected date: 01/18/2019  -     MRI Brain Without Contrast; Future; Expected date: 01/18/2019    Episode of dizziness  -     Radiology US Carotid Bilateral; Future; Expected date: 01/18/2019    Hypertension, unspecified type    H/O gastric bypass    H/O non anemic vitamin B12 deficiency

## 2019-01-19 RX ORDER — TESTOSTERONE CYPIONATE 200 MG/ML
100 INJECTION, SOLUTION INTRAMUSCULAR
Qty: 10 ML | Refills: 5
Start: 2019-01-19 | End: 2019-07-20

## 2019-01-19 RX ORDER — DILTIAZEM HYDROCHLORIDE 120 MG/1
120 TABLET, FILM COATED ORAL EVERY 12 HOURS
Qty: 180 TABLET | Refills: 3 | Status: SHIPPED | OUTPATIENT
Start: 2019-01-19 | End: 2019-06-17

## 2019-01-19 RX ORDER — OLMESARTAN MEDOXOMIL 20 MG/1
20 TABLET ORAL DAILY
Qty: 30 TABLET | Refills: 5 | Status: SHIPPED | OUTPATIENT
Start: 2019-01-19 | End: 2019-03-25

## 2019-02-12 ENCOUNTER — HOSPITAL ENCOUNTER (OUTPATIENT)
Dept: RADIOLOGY | Facility: HOSPITAL | Age: 64
Discharge: HOME | End: 2019-02-12
Attending: PSYCHIATRY & NEUROLOGY
Payer: COMMERCIAL

## 2019-02-12 DIAGNOSIS — R42 EPISODE OF DIZZINESS: ICD-10-CM

## 2019-02-12 DIAGNOSIS — R41.3 MEMORY IMPAIRMENT: ICD-10-CM

## 2019-02-12 PROCEDURE — 70551 MRI BRAIN STEM W/O DYE: CPT | Mod: TC,PO

## 2019-02-12 PROCEDURE — 93880 EXTRACRANIAL BILAT STUDY: CPT | Mod: 26,,, | Performed by: RADIOLOGY

## 2019-02-12 PROCEDURE — 93880 US CAROTID BILATERAL: ICD-10-PCS | Mod: 26,,, | Performed by: RADIOLOGY

## 2019-02-12 PROCEDURE — 70551 MRI BRAIN STEM W/O DYE: CPT | Mod: 26,,, | Performed by: RADIOLOGY

## 2019-02-12 PROCEDURE — 70551 MRI BRAIN WITHOUT CONTRAST: ICD-10-PCS | Mod: 26,,, | Performed by: RADIOLOGY

## 2019-02-12 PROCEDURE — 93880 EXTRACRANIAL BILAT STUDY: CPT | Mod: TC,PO

## 2019-03-03 DIAGNOSIS — R35.0 URINARY FREQUENCY: ICD-10-CM

## 2019-03-03 DIAGNOSIS — N32.81 OAB (OVERACTIVE BLADDER): ICD-10-CM

## 2019-03-05 RX ORDER — OXYBUTYNIN CHLORIDE 10 MG/1
TABLET, EXTENDED RELEASE ORAL
Qty: 90 TABLET | Refills: 0 | Status: SHIPPED | OUTPATIENT
Start: 2019-03-05 | End: 2019-06-11 | Stop reason: SDUPTHER

## 2019-03-11 DIAGNOSIS — E55.9 VITAMIN D DEFICIENCY: Primary | ICD-10-CM

## 2019-03-14 ENCOUNTER — TELEPHONE (OUTPATIENT)
Dept: NEUROLOGY | Facility: CLINIC | Age: 64
End: 2019-03-14

## 2019-03-14 NOTE — TELEPHONE ENCOUNTER
----- Message from Patricia Lopez sent at 3/14/2019 10:02 AM CDT -----  Type: Needs Medical Advice    Who Called:  Patient    Best Call Back Number:  [phone] (work)/ [phone]    Additional Information: Patient states has a procedure scheduled for 4/29/19 and advised to schedule an appt 2 weeks later with Dr. Espinosa. Please contact to advise

## 2019-03-15 ENCOUNTER — LAB VISIT (OUTPATIENT)
Dept: LAB | Facility: HOSPITAL | Age: 64
End: 2019-03-15
Attending: FAMILY MEDICINE
Payer: COMMERCIAL

## 2019-03-15 ENCOUNTER — TELEPHONE (OUTPATIENT)
Dept: FAMILY MEDICINE | Facility: CLINIC | Age: 64
End: 2019-03-15

## 2019-03-15 DIAGNOSIS — I10 ESSENTIAL HYPERTENSION: ICD-10-CM

## 2019-03-15 DIAGNOSIS — E55.9 VITAMIN D DEFICIENCY: ICD-10-CM

## 2019-03-15 DIAGNOSIS — E34.9 TESTOSTERONE DEFICIENCY: ICD-10-CM

## 2019-03-15 DIAGNOSIS — R73.02 IMPAIRED GLUCOSE TOLERANCE: ICD-10-CM

## 2019-03-15 DIAGNOSIS — R73.9 HYPERGLYCEMIA: ICD-10-CM

## 2019-03-15 LAB
25(OH)D3+25(OH)D2 SERPL-MCNC: 27 NG/ML
ALBUMIN SERPL BCP-MCNC: 3.8 G/DL
ALP SERPL-CCNC: 73 U/L
ALT SERPL W/O P-5'-P-CCNC: 21 U/L
ANION GAP SERPL CALC-SCNC: 5 MMOL/L
AST SERPL-CCNC: 18 U/L
BILIRUB SERPL-MCNC: 0.7 MG/DL
BUN SERPL-MCNC: 12 MG/DL
CALCIUM SERPL-MCNC: 9.4 MG/DL
CHLORIDE SERPL-SCNC: 105 MMOL/L
CHOLEST SERPL-MCNC: 131 MG/DL
CHOLEST/HDLC SERPL: 4.1 {RATIO}
CO2 SERPL-SCNC: 29 MMOL/L
CREAT SERPL-MCNC: 1.1 MG/DL
EST. GFR  (AFRICAN AMERICAN): >60 ML/MIN/1.73 M^2
EST. GFR  (NON AFRICAN AMERICAN): >60 ML/MIN/1.73 M^2
ESTIMATED AVG GLUCOSE: 120 MG/DL
GLUCOSE SERPL-MCNC: 87 MG/DL
HBA1C MFR BLD HPLC: 5.8 %
HDLC SERPL-MCNC: 32 MG/DL
HDLC SERPL: 24.4 %
LDLC SERPL CALC-MCNC: 76.6 MG/DL
NONHDLC SERPL-MCNC: 99 MG/DL
POTASSIUM SERPL-SCNC: 5 MMOL/L
PROT SERPL-MCNC: 6.9 G/DL
SODIUM SERPL-SCNC: 139 MMOL/L
TESTOST SERPL-MCNC: 1084 NG/DL
TRIGL SERPL-MCNC: 112 MG/DL

## 2019-03-15 PROCEDURE — 83036 HEMOGLOBIN GLYCOSYLATED A1C: CPT

## 2019-03-15 PROCEDURE — 80053 COMPREHEN METABOLIC PANEL: CPT

## 2019-03-15 PROCEDURE — 36415 COLL VENOUS BLD VENIPUNCTURE: CPT | Mod: PO

## 2019-03-15 PROCEDURE — 82306 VITAMIN D 25 HYDROXY: CPT

## 2019-03-15 PROCEDURE — 84403 ASSAY OF TOTAL TESTOSTERONE: CPT

## 2019-03-15 PROCEDURE — 80061 LIPID PANEL: CPT

## 2019-03-15 NOTE — TELEPHONE ENCOUNTER
----- Message from Clare Stuart sent at 3/15/2019  7:44 AM CDT -----  Contact: Mr Nice  Good Morning,    Mr Nice came and did his lab work today but no Vitamin D was attached. Please attach to today's appointment, the lab tulio his blood for it.

## 2019-03-25 ENCOUNTER — OFFICE VISIT (OUTPATIENT)
Dept: FAMILY MEDICINE | Facility: CLINIC | Age: 64
End: 2019-03-25
Payer: COMMERCIAL

## 2019-03-25 VITALS
HEIGHT: 71 IN | OXYGEN SATURATION: 97 % | DIASTOLIC BLOOD PRESSURE: 86 MMHG | WEIGHT: 256.38 LBS | TEMPERATURE: 99 F | HEART RATE: 80 BPM | RESPIRATION RATE: 16 BRPM | SYSTOLIC BLOOD PRESSURE: 140 MMHG | BODY MASS INDEX: 35.89 KG/M2

## 2019-03-25 DIAGNOSIS — E34.9 TESTOSTERONE DEFICIENCY: ICD-10-CM

## 2019-03-25 DIAGNOSIS — R41.3 MEMORY IMPAIRMENT: ICD-10-CM

## 2019-03-25 DIAGNOSIS — E55.9 VITAMIN D DEFICIENCY: ICD-10-CM

## 2019-03-25 DIAGNOSIS — I10 UNCONTROLLED HYPERTENSION: Primary | ICD-10-CM

## 2019-03-25 PROCEDURE — 3008F BODY MASS INDEX DOCD: CPT | Mod: CPTII,S$GLB,, | Performed by: FAMILY MEDICINE

## 2019-03-25 PROCEDURE — 99999 PR PBB SHADOW E&M-EST. PATIENT-LVL III: CPT | Mod: PBBFAC,,, | Performed by: FAMILY MEDICINE

## 2019-03-25 PROCEDURE — 3077F PR MOST RECENT SYSTOLIC BLOOD PRESSURE >= 140 MM HG: ICD-10-PCS | Mod: CPTII,S$GLB,, | Performed by: FAMILY MEDICINE

## 2019-03-25 PROCEDURE — 99214 OFFICE O/P EST MOD 30 MIN: CPT | Mod: S$GLB,,, | Performed by: FAMILY MEDICINE

## 2019-03-25 PROCEDURE — 99999 PR PBB SHADOW E&M-EST. PATIENT-LVL III: ICD-10-PCS | Mod: PBBFAC,,, | Performed by: FAMILY MEDICINE

## 2019-03-25 PROCEDURE — 3008F PR BODY MASS INDEX (BMI) DOCUMENTED: ICD-10-PCS | Mod: CPTII,S$GLB,, | Performed by: FAMILY MEDICINE

## 2019-03-25 PROCEDURE — 99214 PR OFFICE/OUTPT VISIT, EST, LEVL IV, 30-39 MIN: ICD-10-PCS | Mod: S$GLB,,, | Performed by: FAMILY MEDICINE

## 2019-03-25 PROCEDURE — 3077F SYST BP >= 140 MM HG: CPT | Mod: CPTII,S$GLB,, | Performed by: FAMILY MEDICINE

## 2019-03-25 PROCEDURE — 3079F PR MOST RECENT DIASTOLIC BLOOD PRESSURE 80-89 MM HG: ICD-10-PCS | Mod: CPTII,S$GLB,, | Performed by: FAMILY MEDICINE

## 2019-03-25 PROCEDURE — 3079F DIAST BP 80-89 MM HG: CPT | Mod: CPTII,S$GLB,, | Performed by: FAMILY MEDICINE

## 2019-03-25 RX ORDER — OLMESARTAN MEDOXOMIL 40 MG/1
40 TABLET ORAL DAILY
Qty: 90 TABLET | Refills: 3 | Status: SHIPPED | OUTPATIENT
Start: 2019-03-25 | End: 2019-09-09

## 2019-03-25 RX ORDER — OLMESARTAN MEDOXOMIL 20 MG/1
20 TABLET ORAL DAILY
Qty: 30 TABLET | Refills: 5 | Status: CANCELLED | OUTPATIENT
Start: 2019-03-25 | End: 2020-03-24

## 2019-03-25 NOTE — PROGRESS NOTES
Subjective:       Patient ID: Tex Pickard III is a 63 y.o. male.    Chief Complaint: Hypertension (Unspecified type) and Vitamin D Deficiency    HPI     Here for a f/u.     bp elevated today. At home: 140's/90's.     Reports taking vit d3 49094KL for the past 2 weeks.     Planning to have neuropsychologist for memory impairment.     Taking testosterone for testosterone deficiency.     Review of Systems      Review of Systems   Constitutional: Negative for fever and chills.   HENT: Negative for hearing loss and neck stiffness.    Eyes: Negative for redness and itching.   Respiratory: Negative for cough and choking.    Cardiovascular: Negative for chest pain and leg swelling.  Abdomen: Negative for abdominal pain and blood in stool.   Genitourinary: Negative for dysuria and flank pain.   Musculoskeletal: Negative for back pain and gait problem.   Neurological: Negative for light-headedness and headaches.   Hematological: Negative for adenopathy.   Psychiatric/Behavioral: Negative for behavioral problems.     Objective:      Physical Exam   HENT:   Head: Atraumatic.   Eyes: Pupils are equal, round, and reactive to light. Conjunctivae are normal.   Neck: Normal range of motion.   Cardiovascular: Normal rate and regular rhythm.   No murmur heard.  Pulmonary/Chest: Effort normal and breath sounds normal. He has no wheezes.   Lymphadenopathy:     He has no cervical adenopathy.       Assessment:       1. Uncontrolled hypertension    2. Vitamin D deficiency    3. Memory impairment    4. Testosterone deficiency        Plan:       Uncontrolled hypertension    Vitamin D deficiency  -     Cancel: Vitamin D; Future; Expected date: 05/25/2019  -     Vitamin D; Future; Expected date: 05/14/2019    Memory impairment    Testosterone deficiency    Other orders  -     olmesartan (BENICAR) 40 MG tablet; Take 1 tablet (40 mg total) by mouth once daily.  Dispense: 90 tablet; Refill: 3                Plan:  Inc benicar to 40 mg daily.  Serial bp checks  Check vit d in 2 months  Cont all other meds        Medication List with Changes/Refills   New Medications    OLMESARTAN (BENICAR) 40 MG TABLET    Take 1 tablet (40 mg total) by mouth once daily.   Current Medications    ASPIRIN 81 MG CHEW    Take 81 mg by mouth once daily.    DILTIAZEM (CARDIZEM) 120 MG TABLET    Take 1 tablet (120 mg total) by mouth every 12 (twelve) hours.    MULTIVITAMIN CAPSULE    Take 1 capsule by mouth once daily.    OXYBUTYNIN (DITROPAN-XL) 10 MG 24 HR TABLET        OXYBUTYNIN (DITROPAN-XL) 10 MG 24 HR TABLET    TAKE 1 TABLET(10 MG) BY MOUTH EVERY DAY    OXYMETAZOLINE (AFRIN) 0.05 % NASAL SPRAY    2 sprays by Nasal route every evening.    TESTOSTERONE CYPIONATE (DEPOTESTOTERONE CYPIONATE) 200 MG/ML INJECTION    Inject 0.5 mLs (100 mg total) into the muscle every 14 (fourteen) days.    UNABLE TO FIND    HCG (LYO) 6,000 IU INJECTABLE EMP: MIX WITH 6ML OF BACTERIOSTATIC WATER.  INJECT 0.5ML OR 50 UNITS ON AN INSULIN SYRINGE (500 IU OF HCG) SUBCUTANEOUSLY TWICE A WEEK   Discontinued Medications    OLMESARTAN (BENICAR) 20 MG TABLET    Take 1 tablet (20 mg total) by mouth once daily.

## 2019-04-26 ENCOUNTER — TELEPHONE (OUTPATIENT)
Dept: NEUROLOGY | Facility: CLINIC | Age: 64
End: 2019-04-26

## 2019-04-26 NOTE — TELEPHONE ENCOUNTER
Spoke to Pt he states he need to cancel due to having the flu.       ----- Message from Humphrey Sweeney sent at 4/25/2019 11:26 AM CDT -----  Needs Advice    Reason for call: Pt is calling to reschedule appt on 04/29/19 to a later date, due to pt having the flu        Communication Preference: [phone]    Additional Information:

## 2019-05-03 RX ORDER — OLMESARTAN MEDOXOMIL 20 MG/1
TABLET ORAL
Qty: 30 TABLET | Refills: 0 | Status: SHIPPED | OUTPATIENT
Start: 2019-05-03 | End: 2019-06-17

## 2019-05-03 NOTE — TELEPHONE ENCOUNTER
Dr. Ramsay out, please advise.  This is refill request for olmesartan, which is on back order for all pharmacies.  I spoken with pt, he is aware that an alternative will be sent in.    I can not remove olmesartan from this request.

## 2019-05-13 ENCOUNTER — TELEPHONE (OUTPATIENT)
Dept: NEUROLOGY | Facility: CLINIC | Age: 64
End: 2019-05-13

## 2019-05-13 NOTE — TELEPHONE ENCOUNTER
----- Message from Dallas Bell sent at 5/13/2019  3:10 PM CDT -----   Type:  Sooner Apoointment Request    Caller is requesting a sooner appointment.  Caller declined first available appointment listed below.  Caller will not accept being placed on the waitlist and is requesting a message be sent to doctor.    Name of Caller: self   When is the first available appointment?  07/2019Symptoms:    Best Call Back Number:  [phone]  Additional Information: Patient is scheduled for procedure on 06/03/2019 at [facility]. Patient was advised to see the doctor two weeks after the procedure.

## 2019-06-03 ENCOUNTER — INITIAL CONSULT (OUTPATIENT)
Dept: NEUROLOGY | Facility: CLINIC | Age: 64
End: 2019-06-03
Payer: COMMERCIAL

## 2019-06-03 ENCOUNTER — PATIENT OUTREACH (OUTPATIENT)
Dept: ADMINISTRATIVE | Facility: HOSPITAL | Age: 64
End: 2019-06-03

## 2019-06-03 DIAGNOSIS — G31.84 MILD NEUROCOGNITIVE DISORDER: ICD-10-CM

## 2019-06-03 PROCEDURE — 96132 NRPSYC TST EVAL PHYS/QHP 1ST: CPT | Mod: S$GLB,,, | Performed by: PSYCHIATRY & NEUROLOGY

## 2019-06-03 PROCEDURE — 99499 UNLISTED E&M SERVICE: CPT | Mod: S$GLB,,, | Performed by: PSYCHIATRY & NEUROLOGY

## 2019-06-03 PROCEDURE — 90791 PR PSYCHIATRIC DIAGNOSTIC EVALUATION: ICD-10-PCS | Mod: S$GLB,,, | Performed by: PSYCHIATRY & NEUROLOGY

## 2019-06-03 PROCEDURE — 96139 PSYCL/NRPSYC TST TECH EA: CPT | Mod: S$GLB,,, | Performed by: PSYCHIATRY & NEUROLOGY

## 2019-06-03 PROCEDURE — 96132 PR NEUROPSYCHOLOGIC TEST EVAL SVCS, 1ST HR: ICD-10-PCS | Mod: S$GLB,,, | Performed by: PSYCHIATRY & NEUROLOGY

## 2019-06-03 PROCEDURE — 96138 PR PSYCH/NEUROPSYCH TEST ADMIN/SCORING, BY TECH, 2+ TESTS, 1ST 30 MIN: ICD-10-PCS | Mod: S$GLB,,, | Performed by: PSYCHIATRY & NEUROLOGY

## 2019-06-03 PROCEDURE — 96139 PR PSYCH/NEUROPSYCH TEST ADMIN/SCORING, BY TECH, 2+ TESTS, EA ADDTL 30 MIN: ICD-10-PCS | Mod: S$GLB,,, | Performed by: PSYCHIATRY & NEUROLOGY

## 2019-06-03 PROCEDURE — 99499 NO LOS: ICD-10-PCS | Mod: S$GLB,,, | Performed by: PSYCHIATRY & NEUROLOGY

## 2019-06-03 PROCEDURE — 90791 PSYCH DIAGNOSTIC EVALUATION: CPT | Mod: S$GLB,,, | Performed by: PSYCHIATRY & NEUROLOGY

## 2019-06-03 PROCEDURE — 96138 PSYCL/NRPSYC TECH 1ST: CPT | Mod: S$GLB,,, | Performed by: PSYCHIATRY & NEUROLOGY

## 2019-06-03 PROCEDURE — 96133 PR NEUROPSYCHOLOGIC TEST EVAL SVCS, EA ADDTL HR: ICD-10-PCS | Mod: S$GLB,,, | Performed by: PSYCHIATRY & NEUROLOGY

## 2019-06-03 PROCEDURE — 96133 NRPSYC TST EVAL PHYS/QHP EA: CPT | Mod: S$GLB,,, | Performed by: PSYCHIATRY & NEUROLOGY

## 2019-06-03 NOTE — PROGRESS NOTES
NEUROPSYCHOLOGICAL EVALUATION - CONFIDENTIAL    REFERRAL SOURCE: Amairani Espinosa MD  MEDICAL NECESSITY:  Evaluate cognitive functioning in the setting of self-reported memory loss.   DATE CONDUCTED: 6/3/2019    SOURCES OF INFORMATION:  The following was gathered from a clinical interview with Mr. Tex Pickard III and review of the available medical records. Mr. Pickard expressed an understanding of the purpose of the evaluation and consented to all procedures. Total licensed billing psychologists professional time including clinical interview, test administration and interpretation of tests administered by the billing psychologist, integration of test results and other clinical data, preparing the final report, and personally reporting results to the patient   38025 - 60 minutes, 50966/13715 - 180 minutes, 52397/91605 - 114 minutes    HISTORY OF PRESENT ILLNESS: Mr. Tex Pickard III is a 63 y.o., right-handed,   male with 16 years of education who was referred for a neuropsychological evaluation in the setting of self and spouse reported memory loss. Both Mr. Pickard and his wife acknowledged that they may have attributed instances of memory loss to normal aging had it not been for his mother's memory loss. She has prominent short-term memory loss, but his father has been somewhat reticent to pursue a diagnosis or accept caregiving support. Regardless, Mr. Pickard's suspicion that his mother has Alzheimer's disease contributed to his decision to pursue a work-up and establish a cognitive baseline. He self-described as a very proactive individual, especially about his health, so pursing this evaluation is very much in line with his personality.     Both Mr. 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 He continues to meet his responsibilities in all of these areas, including  "complex activities of daily living. A referral to neurology was initiated after he went to an appointment with his PCP in late 2018 and could not recall why he scheduled the appointment. His wife also recalled an instance when he seemed "off" which prompted evaluation at the ED. His blood pressure was elevated, which has improved with medication optimization. He was also recently treated for vitamin D deficiency. She also noted that he independently decided to increase testosterone injections from once to twice per week, which she suspects led to some side effects. Several of these issues have resolved and while his wife feels his cognition has stabilized, she continues to notice instances of memory loss. For instance, he recently did not recall a family member having a miscarriage within the past year, even though he was the one who originally informed his wife about the miscarriage. She has wondered if he has a "selective memory" as he seems to remember details that are important/relevant for him, but doesn't remember details that are not completely relevant for him. She also acknowledges that instances of forgetting may simply be a result of having multiple responsibilities.     Mr. Pickard's wife denied changes in personality or judgment. She finds that he is slightly more short-tempered than in the past. He is an individual who states "stress and anxiety are not in my vocabulary," which makes her wonder if he simply denies when he is overstressed.     IADLS/DAILY FUNCTIONING:   Support System: Resides with wife, 22 year old and 16 year old step-daughters.   Appointment Management: independent, he keeps lists and a calendar.   Medication Compliance: independent with strong compliance.   Financial Management: Jointly with wife. He has most bills set to automated debit with no reported problems.   Cooking: Rarely.   Driving: His wife feels that he is a little more aggressive with less patience while driving. " "    MEDICAL HISTORY: Mr. Pickard has a past medical history of Arthritis, Colon polyps, BOBBI, and Hypertension. He reported strong CPAP compliance. He averages about 7 hours per night with minimal daytime somnolence.     NEUROIMAGING:  Brain MRI 2/2019: "Negative MRI of the brain."    SUBSTANCE USE: Mr. Pickard  reports he quit smoking cigars and chewing tobacco over 20 years ago. He reports that he does not drink alcohol or use drugs. He denied a history of alcohol abuse, noting that he quit 10 years ago.     CURRENT MEDICATIONS:   Current Outpatient Medications:     aspirin 81 MG Chew, Take 81 mg by mouth once daily., Disp: , Rfl:     diltiaZEM (CARDIZEM) 120 MG tablet, Take 1 tablet (120 mg total) by mouth every 12 (twelve) hours., Disp: 180 tablet, Rfl: 3    multivitamin capsule, Take 1 capsule by mouth once daily., Disp: , Rfl:     olmesartan (BENICAR) 20 MG tablet, TAKE 1 TABLET(20 MG) BY MOUTH EVERY DAY, Disp: 30 tablet, Rfl: 0    olmesartan (BENICAR) 40 MG tablet, Take 1 tablet (40 mg total) by mouth once daily., Disp: 90 tablet, Rfl: 3    oxybutynin (DITROPAN-XL) 10 MG 24 hr tablet, , Disp: , Rfl: 0    oxybutynin (DITROPAN-XL) 10 MG 24 hr tablet, TAKE 1 TABLET(10 MG) BY MOUTH EVERY DAY, Disp: 90 tablet, Rfl: 0    oxymetazoline (AFRIN) 0.05 % nasal spray, 2 sprays by Nasal route every evening., Disp: , Rfl:     testosterone cypionate (DEPOTESTOTERONE CYPIONATE) 200 mg/mL injection, Inject 0.5 mLs (100 mg total) into the muscle every 14 (fourteen) days., Disp: 10 mL, Rfl: 5    UNABLE TO FIND, HCG (LYO) 6,000 IU INJECTABLE EMP: MIX WITH 6ML OF BACTERIOSTATIC WATER.  INJECT 0.5ML OR 50 UNITS ON AN INSULIN SYRINGE (500 IU OF HCG) SUBCUTANEOUSLY TWICE A WEEK, Disp: , Rfl:      PSYCHIATRIC HISTORY: Unremarkable pre-morbid history. He indicated experiencing "a little anxiety" after divorce, which improved with time and his children's support. He has never pursued mental health treatment.     SUICIDAL " IDEATION:  History: Denied  Active Suicidal Ideation:Denied  Plan/Intent: Denied    FAMILY HISTORY: family history includes Dementia in his mother; Diabetes in his father; Hypertension in his mother.    PSYCHOSOCIAL HISTORY:   Education:   Level Attained: Bachelor's degree   Learning Difficulties: Endorsed, he recalled early problems with reading comprehension. He was close to repeating second grade. His academic performance improved as he learned compensatory mechanism.    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 He would like to retire within the next few years. He also owns and operates several rental properties which he plans to keep for many years.     Relationship Status:   : yes, 5 years.    : yes, one prior marriage   Children: 3 children from first marriage.     MENTAL STATUS AND OBSERVATIONS:  APPEARANCE: Casually dressed and adequate grooming/hygiene.   ALERTNESS/ORIENTATION: Attentive and alert.   GAIT/MOTOR: Unremarkable  SENSORY: Unremarkable  SPEECH/LANGUAGE: Normal in rate, rhythm, tone, and volume. No significant word finding difficulty noted. Expressive and receptive language was normal.  STATED MOOD/AFFECT: Mood was euthymic.   INTERPERSONAL BEHAVIOR: Rapport was quickly and easily established   THOUGHT PROCESSES: Thoughts seemed logical and goal-directed.   TEST TAKING BEHAVIOR and VALIDITY: Scores on a stand-alone measure of performance validity was within normal limits. Variable scores on embedded measures. He had a very fast approach to testing, which was also consistent with how he answered questions during the clinical interview. This occasionally resulted in seemingly avoidable errors, such as providing an incorrect answer to a very basic addition problem that he provided the answer to with 1 second of deliberation. There was also intra-test variability that suggested possible  fluctuating task engagement. For instance, he performed better on a harder compared to easier subtest on Digit Span.     TEST RESULTS: The test scores are also included in an appendix to this report.      Mental Status:   1/2019 MOCA = 25/30  6/2019 MOCA = 25/30    Fully oriented to time and place. He encoded 5/5 words after 2 trials, freely recalling 1/5 words following a brief delay. He recalled 1 word with categorical cueing and correctly identified the remaining 3 words with multiple choice cueing. He accurately tulio a clock face and correctly set the clock hands at the designated time. He provided 5/5 correct responses on a serial 7 subtraction task.     Pre-morbid/Baseline: Estimated to be in the average range.     Language: Intact confrontation naming. Average semantic verbal fluency, low average letter verbal fluency.     Visuospatial: Copy of a complex figure was below expectation, primarily due to his approach to the task. He completed the drawing very quickly, which may have contributed to inattentive errors with slightly poor planning and disorganization. There was no clear evidence of visuospatial dysfunction.     Learning/Memory: Overall encoding of two short stories was extremely low as he recalled 7/25 details from the first story and 3/25 details from the second story. He recalled general themes from each story following a long delay, but did not provide nearly all of the specific story details. His overall recall was extremely low. Responses to yes/no questions pertaining to the stories was low average. Overall encoding of a supraspan word list was average, although he had an unusual learning curve, recalling 4 and 4 words on the first two trials and then 9, 10, and 9 words on the last three trials. He seemed to cluster some semantically related words, but he did not use a consistent strategy across the trials. He mostly relied upon a recency effect. He then encoded 4/16 words from a second,  distracter list (low average). Retention was below expectation after exposure to the distracter list as he freely recalled 6/16 words (average) and 7/16 words with categorical cueing (low average). Retention was intact following a long delay, but his overall recall was low average (6/16 words). Performance did not improve with categorical cueing (7/16 words, low average, 3 intrusion errors). Recognition was well below expectation as he correctly identified 15/16 words, but made 16 false positive errors. 10 of the errors were from the distracter list, suggestive of source memory difficulties.     Executive Functioning: Performance on tests of working memory were low average, with intra-test variability as noted above. He provided answers very quickly on these tasks, which seemed to contribute to avoidable errors to basic mental arithmetic questions. Performance on tests of processing speed was average/high average. Performance on a test of divided attention/set shifting was average. One trial learning/encoding was consistently below expectation.     Mood: Mr. Pickard denied symptoms suggestive of clinically significant depression or anxiety.     SUMMARY AND IMPRESSION: Mr. Tex Pickard III is a 63 y.o. male who was referred for a neuropsychological evaluation in the setting of self and spouse reported memory loss.     The neuropsychological test results are highly inconsistent with Mr. Pickard's reported level of functioning. In fact, he was fairly insistent that this evaluation was meant to establish a baseline rather than assess for cognitive dysfunction. However, test scores were well below expectation, especially on tests of learning/memory. There may be several explanations for this discrepancy, which will be further explored in the feedback session (and will be included in an addendum to this report). With the information available at this time, it is possible that Mr. Pickard is having more functional  "difficulties than are readily apparent to family and colleagues. If this is the case, further work-up for a neurodegenerative condition would be warranted. It is also possible that the present test results are a reflection of suboptimal/inconsistent effort, which would be somewhat unusual as he was reportedly motivated to establish a cognitive baseline. Regardless, his very fast approach throughout testing led to seemingly unnecessary errors. Mr. Pickard also reported early academic difficulties that may speak to his test taking abilities and/or attention, both of which are known to impact neuropsychological assessment. Further work-up/observation appears prudent at this time.     Addendum 6/21/2019: I spoke with Mr. Pickard and his wife over the phone. He noted several possible explanations for his below expectation performance, including feeling the testing was "tedious," providing the "carter notes version" on tests of learning/memory, and longstanding issues with traditional test taking/learning (he almost repeated second grade). It was noteworthy that he accurately recalled specific tests and specific details from tests, such as story learning/memory. While the aforementioned explanations are certainly plausible and may have contributed to the findings, I agreed with Mr. Pickard and his wife that it may be prudent to continue to explore explanations for cognitive change, including possibly a PET scan. They will follow up with Dr. Espinosa.     DIAGNOSIS:  1. Rule Out Mild Cognitive Impairment    RECOMMENDATIONS:  1. Neuropsychology Follow-up - Further discussion of neuropsychological test results on 6/17/2019. Will explore the above hypotheses, including assessing for functional difficulties not disclosed during the clinical interview. A re-evaluation in 6-12 months may also be useful given the lack of diagnostic clarity.   2. Further Work-up - May include a PET scan for diagnostic refinement.   3. Optimize " Brain Health - Prioritize physical and social activity. Maintain a heart healthy diet and 100% treatment compliance.     Mr. Pickard will be provided the results of the evaluation.     Thank you for allowing me to participate in Mr. Magana care.  If you have any questions, please contact me at [phone].    Osbaldo Macedo Psy.D., ABPP  Board Certified in Clinical Neuropsychology  [facility] - Department of Neurology    APPENDIX  TESTS ADMINISTERED:  Clinical Interview and Review of Records, Idris Cognitive Assessment (MoCA), Test of Premorbid Functioning (TOPF), selected subtests from the Wechsler Adult Intelligence Scale - 4th Edition (WAIS-IV), Logical Memory subtest form the Wechsler Memory Scale - 4th Edition (WMS-IV), [place] Verbal Learning Test - Second edition (CVLT-II), Noah Complex Figure (copy trial only), Trailmaking Test Part A and B, Controlled Oral Word Association Test (COWAT), Semantic Verbal Fluency (Animals), Naming subtest from the NAB, Generalized Anxiety Disorder - 7 (ANDREINA-7), and the Baldwin Depression Inventory - second edition (BDI-2).     TOPF    Standard Score  (+ simple demographics) 98 (predicted FSIQ = 108)     MoCA    25/30     WAIS-IV   Index  Working Memory Index 86  Processing Speed Index 108    Individual subtests  Scaled Score    Digit Span   8   LDF   5 (raw)   LDB   3 (raw)   LDS   6 (raw)  RDS   7  Arithmetic   7  Symbol Search  11    Coding    12    WMS-IV   Index Score  Auditory Memory Index 74   Auditory Immediate  73  Auditory Delayed  74      Scaled Score  Logical Memory I  3  Logical Memory II  3  CVLT II (t1-5)   8  CVLT II (LD)   8  Logical Memory II Recog. 21/30 (raw) 10-16% (base rate)    CVLT-II   Z/T scores  T1-5     45   T1    -1.5  T2    -2.0  T3     0  T4     0  T5    -0.5  List B    -1.0  SDFR    -0.5  SDCR    -1.0  LDFR    -1.0  LDCR    -1.0  Total Recog. Disc  -1.5 (15/16 hits, 16 false positives)  FC     16/16    Noah Complex  Figure  Percentile  Copy    <1% (25/36)  Time    >16% (80 seconds)                             [facility] Norms   T-Score  Trails A   57  Trails B   46 (0 errors)  FAS    40  Animal fluency   44      NAB    T-Score  Naming   54 (31/31)                        BDI-2    2  ANDREINA-7    1

## 2019-06-03 NOTE — LETTER
June 11, 2019    Tex Pickard III  [address] [address]             [facility]  [address]  [facility] [address]  Phone: [phone] Dear Mr. Pickard:    We have tried to reach you by mychart unsuccessfully.      Your [facility] primary care team is dedicated to assisting you achieve your health goals.  [facility] now offers a Digital Medicine Program that could help you better manage your Hypertension.     As a participant in Digital Medicine, you will be able to send home BLOOD PRESSURE readings directly from your smartphone into your medical record at [facility]. Robert Ramsay MD , along with a team of pharmacists and health coaches, will monitor this data, help you adjust your medication(s), keep you on track with your routine preventative testing, and/or make lifestyle recommendations, so you can meet your personal health goals.     Getting you the right support to coordinate your care and achieve your healthcare goals is important to us. Please consider taking advantage of this unique service to help you improve your health.  Please discuss at your upcoming appointment if you are interested in signing up.             Sincerely,     Your [facility],   MD Tamie F[facility]   Clinical Care Coordinator   [facility]         If you have any questions or concerns, please don't hesitate to call.

## 2019-06-03 NOTE — PROGRESS NOTES
Digital Medicine Outreach.  Chart review complete/scrubbed 06/03/2019  Future Appointments   Date Time Provider Department Center   6/3/2019 12:30 PM Osbaldo Macedo PsyD [facility] NEURPSY [facility]   6/17/2019  8:00 AM Robert Ramsay MD [facility]   7/1/2019  1:30 PM Amairani Espinosa MD [facility]     Health Maintenance Due   Topic Date Due    Shingles Vaccine (2 of 3) 03/16/2015

## 2019-06-04 DIAGNOSIS — R35.0 URINARY FREQUENCY: ICD-10-CM

## 2019-06-04 DIAGNOSIS — N32.81 OAB (OVERACTIVE BLADDER): ICD-10-CM

## 2019-06-10 DIAGNOSIS — R35.0 URINARY FREQUENCY: ICD-10-CM

## 2019-06-10 DIAGNOSIS — N32.81 OAB (OVERACTIVE BLADDER): ICD-10-CM

## 2019-06-11 RX ORDER — OXYBUTYNIN CHLORIDE 10 MG/1
TABLET, EXTENDED RELEASE ORAL
Qty: 90 TABLET | Refills: 3 | Status: SHIPPED | OUTPATIENT
Start: 2019-06-11 | End: 2019-09-09

## 2019-06-16 ENCOUNTER — PATIENT MESSAGE (OUTPATIENT)
Dept: NEUROLOGY | Facility: CLINIC | Age: 64
End: 2019-06-16

## 2019-06-17 ENCOUNTER — PATIENT MESSAGE (OUTPATIENT)
Dept: NEUROLOGY | Facility: CLINIC | Age: 64
End: 2019-06-17

## 2019-06-17 ENCOUNTER — LAB VISIT (OUTPATIENT)
Dept: LAB | Facility: HOSPITAL | Age: 64
End: 2019-06-17
Attending: FAMILY MEDICINE
Payer: COMMERCIAL

## 2019-06-17 ENCOUNTER — OFFICE VISIT (OUTPATIENT)
Dept: FAMILY MEDICINE | Facility: CLINIC | Age: 64
End: 2019-06-17
Payer: COMMERCIAL

## 2019-06-17 VITALS
HEART RATE: 73 BPM | WEIGHT: 261.69 LBS | HEIGHT: 71 IN | DIASTOLIC BLOOD PRESSURE: 90 MMHG | TEMPERATURE: 99 F | RESPIRATION RATE: 16 BRPM | BODY MASS INDEX: 36.64 KG/M2 | OXYGEN SATURATION: 96 % | SYSTOLIC BLOOD PRESSURE: 138 MMHG

## 2019-06-17 DIAGNOSIS — E55.9 VITAMIN D DEFICIENCY: ICD-10-CM

## 2019-06-17 DIAGNOSIS — R41.3 MEMORY IMPAIRMENT: ICD-10-CM

## 2019-06-17 DIAGNOSIS — I10 UNCONTROLLED HYPERTENSION: Primary | ICD-10-CM

## 2019-06-17 PROCEDURE — 99214 OFFICE O/P EST MOD 30 MIN: CPT | Mod: S$GLB,,, | Performed by: FAMILY MEDICINE

## 2019-06-17 PROCEDURE — 3008F PR BODY MASS INDEX (BMI) DOCUMENTED: ICD-10-PCS | Mod: CPTII,S$GLB,, | Performed by: FAMILY MEDICINE

## 2019-06-17 PROCEDURE — 99999 PR PBB SHADOW E&M-EST. PATIENT-LVL III: CPT | Mod: PBBFAC,,, | Performed by: FAMILY MEDICINE

## 2019-06-17 PROCEDURE — 3080F DIAST BP >= 90 MM HG: CPT | Mod: CPTII,S$GLB,, | Performed by: FAMILY MEDICINE

## 2019-06-17 PROCEDURE — 99214 PR OFFICE/OUTPT VISIT, EST, LEVL IV, 30-39 MIN: ICD-10-PCS | Mod: S$GLB,,, | Performed by: FAMILY MEDICINE

## 2019-06-17 PROCEDURE — 99999 PR PBB SHADOW E&M-EST. PATIENT-LVL III: ICD-10-PCS | Mod: PBBFAC,,, | Performed by: FAMILY MEDICINE

## 2019-06-17 PROCEDURE — 3008F BODY MASS INDEX DOCD: CPT | Mod: CPTII,S$GLB,, | Performed by: FAMILY MEDICINE

## 2019-06-17 PROCEDURE — 82306 VITAMIN D 25 HYDROXY: CPT

## 2019-06-17 PROCEDURE — 3080F PR MOST RECENT DIASTOLIC BLOOD PRESSURE >= 90 MM HG: ICD-10-PCS | Mod: CPTII,S$GLB,, | Performed by: FAMILY MEDICINE

## 2019-06-17 PROCEDURE — 3075F PR MOST RECENT SYSTOLIC BLOOD PRESS GE 130-139MM HG: ICD-10-PCS | Mod: CPTII,S$GLB,, | Performed by: FAMILY MEDICINE

## 2019-06-17 PROCEDURE — 3075F SYST BP GE 130 - 139MM HG: CPT | Mod: CPTII,S$GLB,, | Performed by: FAMILY MEDICINE

## 2019-06-17 PROCEDURE — 36415 COLL VENOUS BLD VENIPUNCTURE: CPT | Mod: PO

## 2019-06-17 RX ORDER — DILTIAZEM HYDROCHLORIDE 180 MG/1
180 CAPSULE, COATED, EXTENDED RELEASE ORAL DAILY
Qty: 30 CAPSULE | Refills: 2 | Status: SHIPPED | OUTPATIENT
Start: 2019-06-17 | End: 2019-07-19 | Stop reason: SDUPTHER

## 2019-06-17 RX ORDER — FINASTERIDE 1 MG/1
1 TABLET, FILM COATED ORAL DAILY
Refills: 3 | COMMUNITY
Start: 2019-06-04

## 2019-06-17 NOTE — PROGRESS NOTES
Subjective:       Patient ID: Tex Pickard III is a 63 y.o. male.    Chief Complaint: Hypertension (Follow up)    HPI     Here for a f/u.     bp elevated.    Planning to f/u with neuropsych today.    Taking vit d3 15281 IU mon-Friday for the past 3 months    Review of Systems      Review of Systems   Constitutional: Negative for fever and chills.   HENT: Negative for hearing loss and neck stiffness.    Eyes: Negative for redness and itching.   Respiratory: Negative for cough and choking.    Cardiovascular: Negative for chest pain and leg swelling.  Abdomen: Negative for abdominal pain and blood in stool.   Genitourinary: Negative for dysuria and flank pain.   Musculoskeletal: Negative for back pain and gait problem.   Neurological: Negative for light-headedness and headaches.   Hematological: Negative for adenopathy.   Psychiatric/Behavioral: Negative for behavioral problems.     Objective:      Physical Exam   HENT:   Head: Atraumatic.   Eyes: Pupils are equal, round, and reactive to light. Conjunctivae are normal.   Neck: Normal range of motion.   Cardiovascular: Normal rate and regular rhythm.   No murmur heard.  Pulmonary/Chest: Effort normal and breath sounds normal. He has no wheezes.   Lymphadenopathy:     He has no cervical adenopathy.       Assessment:       1. Uncontrolled hypertension    2. Vitamin D deficiency    3. Memory impairment        Plan:       Uncontrolled hypertension    Vitamin D deficiency  -     Vitamin D; Future; Expected date: 06/17/2019    Memory impairment    Other orders  -     diltiaZEM (CARDIZEM CD) 180 MG 24 hr capsule; Take 1 capsule (180 mg total) by mouth once daily.  Dispense: 30 capsule; Refill: 2            Plan:  See order  Cont benicar. D/c cardizem 120 mg. Start cardizem 180 mg daily. Serial bp checks  Cont all other meds        Medication List with Changes/Refills   New Medications    DILTIAZEM (CARDIZEM CD) 180 MG 24 HR CAPSULE    Take 1 capsule (180 mg total) by mouth  once daily.   Current Medications    ASPIRIN 81 MG CHEW    Take 81 mg by mouth once daily.    FINASTERIDE (PROPECIA) 1 MG TABLET    Take 1 mg by mouth once daily.    MULTIVITAMIN CAPSULE    Take 1 capsule by mouth once daily.    OLMESARTAN (BENICAR) 40 MG TABLET    Take 1 tablet (40 mg total) by mouth once daily.    OXYBUTYNIN (DITROPAN-XL) 10 MG 24 HR TABLET    TAKE 1 TABLET(10 MG) BY MOUTH EVERY DAY    OXYMETAZOLINE (AFRIN) 0.05 % NASAL SPRAY    2 sprays by Nasal route every evening.    TESTOSTERONE CYPIONATE (DEPOTESTOTERONE CYPIONATE) 200 MG/ML INJECTION    Inject 0.5 mLs (100 mg total) into the muscle every 14 (fourteen) days.    UNABLE TO FIND    HCG (LYO) 6,000 IU INJECTABLE EMP: MIX WITH 6ML OF BACTERIOSTATIC WATER.  INJECT 0.5ML OR 50 UNITS ON AN INSULIN SYRINGE (500 IU OF HCG) SUBCUTANEOUSLY TWICE A WEEK   Discontinued Medications    DILTIAZEM (CARDIZEM) 120 MG TABLET    Take 1 tablet (120 mg total) by mouth every 12 (twelve) hours.    OLMESARTAN (BENICAR) 20 MG TABLET    TAKE 1 TABLET(20 MG) BY MOUTH EVERY DAY

## 2019-06-18 ENCOUNTER — PATIENT MESSAGE (OUTPATIENT)
Dept: NEUROLOGY | Facility: CLINIC | Age: 64
End: 2019-06-18

## 2019-06-18 LAB
25(OH)D3+25(OH)D2 SERPL-MCNC: 63 NG/ML (ref 30–96)
25(OH)D3+25(OH)D2 SERPL-MCNC: 63 NG/ML (ref 30–96)

## 2019-06-21 ENCOUNTER — PATIENT MESSAGE (OUTPATIENT)
Dept: NEUROLOGY | Facility: CLINIC | Age: 64
End: 2019-06-21

## 2019-06-21 ENCOUNTER — TELEPHONE (OUTPATIENT)
Dept: NEUROLOGY | Facility: CLINIC | Age: 64
End: 2019-06-21

## 2019-06-21 NOTE — TELEPHONE ENCOUNTER
NEUROPSYCHOLOGICAL EVALUATION - CONFIDENTIAL  FEEDBACK NOTE    On 6/21/2019, I provided Mr. Tex Pickard III and his wife the neuropsychological evaluation results. Please see the full report for a comprehensive overview of the findings. Mr. Pickard was provided a copy of the report and invited to call with additional questions.      Osbaldo Macedo Psy.D., IVETP  Board Certified in Clinical Neuropsychology  [facility] - Department of Neurology

## 2019-06-24 ENCOUNTER — PATIENT MESSAGE (OUTPATIENT)
Dept: NEUROLOGY | Facility: CLINIC | Age: 64
End: 2019-06-24

## 2019-06-24 DIAGNOSIS — F02.80 DEMENTIA IN ALZHEIMER'S DISEASE: ICD-10-CM

## 2019-06-24 DIAGNOSIS — G30.9 DEMENTIA IN ALZHEIMER'S DISEASE: ICD-10-CM

## 2019-06-24 NOTE — TELEPHONE ENCOUNTER
FED PET scan ordered given the abnormal neuropsych testing to investigate further for dementia and classify type.

## 2019-06-25 RX ORDER — OXYBUTYNIN CHLORIDE 10 MG/1
TABLET, EXTENDED RELEASE ORAL
Qty: 90 TABLET | Refills: 0 | OUTPATIENT
Start: 2019-06-25

## 2019-06-28 ENCOUNTER — TELEPHONE (OUTPATIENT)
Dept: NEUROLOGY | Facility: CLINIC | Age: 64
End: 2019-06-28

## 2019-06-28 NOTE — TELEPHONE ENCOUNTER
----- Message from Tania Gómez sent at 6/28/2019  1:04 PM CDT -----  Contact: [facility] to speak to someone in the office regarding Pre-authorization for pt PET Scan.calling states pending denial. She is faxing over a form with the number to call back to do a peer to peer.She states have no phone number to call back.

## 2019-07-08 ENCOUNTER — HOSPITAL ENCOUNTER (OUTPATIENT)
Dept: RADIOLOGY | Facility: HOSPITAL | Age: 64
Discharge: HOME | End: 2019-07-08
Attending: PSYCHIATRY & NEUROLOGY
Payer: COMMERCIAL

## 2019-07-08 DIAGNOSIS — G30.9 DEMENTIA IN ALZHEIMER'S DISEASE: ICD-10-CM

## 2019-07-08 DIAGNOSIS — F02.80 DEMENTIA IN ALZHEIMER'S DISEASE: ICD-10-CM

## 2019-07-08 LAB — POCT GLUCOSE: 85 MG/DL (ref 70–110)

## 2019-07-08 PROCEDURE — 78608 NM PET BRAIN: ICD-10-PCS | Mod: 26,PS,, | Performed by: RADIOLOGY

## 2019-07-08 PROCEDURE — 78608 BRAIN IMAGING (PET): CPT | Mod: 26,PS,, | Performed by: RADIOLOGY

## 2019-07-08 PROCEDURE — 78608 BRAIN IMAGING (PET): CPT | Mod: TC

## 2019-07-10 ENCOUNTER — TELEPHONE (OUTPATIENT)
Dept: GASTROENTEROLOGY | Facility: CLINIC | Age: 64
End: 2019-07-10

## 2019-07-10 NOTE — TELEPHONE ENCOUNTER
----- Message from RT Ha sent at 7/9/2019  4:25 PM CDT -----  Contact: pt    pt , requesting to schedule colonoscopy appt, thanks.   Respiratory

## 2019-07-18 ENCOUNTER — TELEPHONE (OUTPATIENT)
Dept: NEUROLOGY | Facility: CLINIC | Age: 64
End: 2019-07-18

## 2019-07-19 DIAGNOSIS — I10 UNCONTROLLED HYPERTENSION: Primary | ICD-10-CM

## 2019-07-19 NOTE — PROGRESS NOTES
Refill Authorization Note     is requesting a refill authorization.    Brief assessment and rationale for refill: DEFER: New start  Name and strength of medication: DILTIAZEM CD 180MG CAPSULES       Medication Therapy Plan: HTN LCO uncontrolled 6/19, cardizem was increase to 180mg; elevated reading at neurology OV; defer to you, new start on this strength    Medication reconciliation completed: No              How patient will take medication: t1t qd          Comments:   Blood Pressure Readings: <139/89mmHg (12 months) Heart Rate: > 50bpm (BB/NDHP-CCBS)   BP Readings from Last 3 Encounters:   06/17/19 (!) 138/90   03/25/19 (!) 140/86   01/18/19 (!) 152/80    Pulse Readings from Last 3 Encounters:   06/17/19 73   03/25/19 80   01/18/19 73        Digital Hypertension Data: (values will display if enrolled)  Last 5 Patient Entered Readings                                      Current 30 Day Average:      There is no flowsheet data to display.        APPOINTMENTS (past 12m or future 3m authorizing provider)  LAST VISIT DATE  Robert Ramsay MD 6/17/2019         NEXT VISIT DATE  Robert Ramsay MD Visit date not found

## 2019-07-22 RX ORDER — DILTIAZEM HYDROCHLORIDE 180 MG/1
CAPSULE, COATED, EXTENDED RELEASE ORAL
Qty: 90 CAPSULE | Refills: 3 | Status: SHIPPED | OUTPATIENT
Start: 2019-07-22 | End: 2019-09-09

## 2019-07-24 ENCOUNTER — OFFICE VISIT (OUTPATIENT)
Dept: NEUROLOGY | Facility: CLINIC | Age: 64
End: 2019-07-24
Payer: COMMERCIAL

## 2019-07-24 VITALS
WEIGHT: 255.88 LBS | HEIGHT: 71 IN | BODY MASS INDEX: 35.82 KG/M2 | SYSTOLIC BLOOD PRESSURE: 144 MMHG | HEART RATE: 89 BPM | RESPIRATION RATE: 20 BRPM | DIASTOLIC BLOOD PRESSURE: 82 MMHG

## 2019-07-24 DIAGNOSIS — R41.3 MEMORY IMPAIRMENT: Primary | ICD-10-CM

## 2019-07-24 PROCEDURE — 99999 PR PBB SHADOW E&M-EST. PATIENT-LVL III: CPT | Mod: PBBFAC,,, | Performed by: PSYCHIATRY & NEUROLOGY

## 2019-07-24 PROCEDURE — 3079F PR MOST RECENT DIASTOLIC BLOOD PRESSURE 80-89 MM HG: ICD-10-PCS | Mod: CPTII,S$GLB,, | Performed by: PSYCHIATRY & NEUROLOGY

## 2019-07-24 PROCEDURE — 3079F DIAST BP 80-89 MM HG: CPT | Mod: CPTII,S$GLB,, | Performed by: PSYCHIATRY & NEUROLOGY

## 2019-07-24 PROCEDURE — 99213 OFFICE O/P EST LOW 20 MIN: CPT | Mod: S$GLB,,, | Performed by: PSYCHIATRY & NEUROLOGY

## 2019-07-24 PROCEDURE — 99213 PR OFFICE/OUTPT VISIT, EST, LEVL III, 20-29 MIN: ICD-10-PCS | Mod: S$GLB,,, | Performed by: PSYCHIATRY & NEUROLOGY

## 2019-07-24 PROCEDURE — 3008F PR BODY MASS INDEX (BMI) DOCUMENTED: ICD-10-PCS | Mod: CPTII,S$GLB,, | Performed by: PSYCHIATRY & NEUROLOGY

## 2019-07-24 PROCEDURE — 3077F SYST BP >= 140 MM HG: CPT | Mod: CPTII,S$GLB,, | Performed by: PSYCHIATRY & NEUROLOGY

## 2019-07-24 PROCEDURE — 99999 PR PBB SHADOW E&M-EST. PATIENT-LVL III: ICD-10-PCS | Mod: PBBFAC,,, | Performed by: PSYCHIATRY & NEUROLOGY

## 2019-07-24 PROCEDURE — 3077F PR MOST RECENT SYSTOLIC BLOOD PRESSURE >= 140 MM HG: ICD-10-PCS | Mod: CPTII,S$GLB,, | Performed by: PSYCHIATRY & NEUROLOGY

## 2019-07-24 PROCEDURE — 3008F BODY MASS INDEX DOCD: CPT | Mod: CPTII,S$GLB,, | Performed by: PSYCHIATRY & NEUROLOGY

## 2019-07-24 NOTE — PATIENT INSTRUCTIONS
The Mediterranean diet is a delicious and healthy way to eat. Many people who switch to this style of eating say they'll never eat any other way. Here are some specific steps to get you started:   Eat your veggies and fruits -- and switch to whole grains. An abundance and variety of plant foods should make up the majority of your meals. Strive for seven to 10 servings a day of veggies and fruits. Switch to whole-grain bread and cereal, and begin to eat more whole-grain rice and pasta products.    Go nuts. Keep almonds, cashews, pistachios and walnuts on hand for a quick snack. Choose natural peanut butter, rather than the kind with hydrogenated fat added. Try tahini (blended sesame seeds) as a dip or spread for bread.    Pass on the butter. Try olive or canola oil as a healthy replacement for butter or margarine. Use it in cooking. Dip bread in flavored olive oil or lightly spread it on whole-grain bread for a tasty alternative to butter. Or try tahini as a dip or spread.    Spice it up. Herbs and spices make food tasty and are also rich in health-promoting substances. Season your meals with herbs and spices rather than salt.    Go fish. Eat fish once or twice a week. Fresh or water-packed tuna, salmon, trout, mackerel and herring are healthy choices. Grilled fish tastes good and requires little cleanup. Avoid fried fish, unless it's sauteed in a small amount of canola oil.    Rein in the red meat. Substitute fish and poultry for red meat. When eaten, make sure it's lean and keep portions small (about the size of a deck of cards). Also avoid sausage, argueta and other high-fat meats.    Choose low-fat dairy. Limit higher fat dairy products such as whole or 2 percent milk, cheese and ice cream. Switch to skim milk, fat-free yogurt and low-fat cheese.  From the [facility] Website

## 2019-07-24 NOTE — PROGRESS NOTES
Date: 7/24/2019    Patient ID: Tex Pickard III is a 63 y.o. male.    Chief Complaint: Memory Loss      History of Present Illness:  Mr. Pickard is a 63 y.o. male who presents for followup of memory and testing. The patient was accompanied by his brother who also contributed to the following history.     He continues to have some memory complaints. His brother notes that he feels his memory is similar and is not overly concerned about his brother. Neuropsych testing was inconsistent. The patient notes that some things were more difficult and he got very frustrated and tired. The FDG PET scan of the brain was normal.     Review of Systems:   14 systems reviewed - All other systems were reviewed and negative except as mentioned in the HPI    Allergies:  Review of patient's allergies indicates:   Allergen Reactions    Cefaclor Hives     ceclor    Codeine Hives       Current Medications:  Current Outpatient Medications   Medication Sig Dispense Refill    aspirin 81 MG Chew Take 81 mg by mouth once daily.      diltiaZEM (CARDIZEM CD) 180 MG 24 hr capsule TAKE ONE CAPSULE BY MOUTH EVERY DAY 90 capsule 3    finasteride (PROPECIA) 1 mg tablet Take 1 mg by mouth once daily.  3    multivitamin capsule Take 1 capsule by mouth once daily.      olmesartan (BENICAR) 40 MG tablet Take 1 tablet (40 mg total) by mouth once daily. 90 tablet 3    oxybutynin (DITROPAN-XL) 10 MG 24 hr tablet TAKE 1 TABLET(10 MG) BY MOUTH EVERY DAY 90 tablet 3    oxymetazoline (AFRIN) 0.05 % nasal spray 2 sprays by Nasal route every evening.      UNABLE TO FIND HCG (LYO) 6,000 IU INJECTABLE EMP: MIX WITH 6ML OF BACTERIOSTATIC WATER.  INJECT 0.5ML OR 50 UNITS ON AN INSULIN SYRINGE (500 IU OF HCG) SUBCUTANEOUSLY TWICE A WEEK      testosterone cypionate (DEPOTESTOTERONE CYPIONATE) 200 mg/mL injection Inject 0.5 mLs (100 mg total) into the muscle every 14 (fourteen) days. 10 mL 5     No current facility-administered medications for this  "visit.        Past Medical History:  Past Medical History:   Diagnosis Date    Arthritis     Colon polyps     Hypertension        Past Surgical History:  Past Surgical History:   Procedure Laterality Date    CATARACT EXTRACTION W/  INTRAOCULAR LENS IMPLANT      curt    COLONOSCOPY N/A 4/14/2014    [facility] by To [facility] Jr[facility]    COLONOSCOPY W/ POLYPECTOMY  9/26/2008  Juan    One 1 to 2 mm polyp in the hepatic flexure.  TUBULAR ADENOMA.   Redundant colon.   Otherwise, the colon and term ileum is normal.     EYE SURGERY      cataract OU    EYE SURGERY Bilateral     eye lid    GASTRIC BYPASS      LIPOSUCTION  2011    plastic surgery - abdominal dermolipectomy ("tummy tuck")    VASECTOMY         Family History:  family history includes Dementia in his mother; Diabetes in his father; Hypertension in his mother.    Social History:   reports that he has quit smoking. He has quit using smokeless tobacco. He reports that he does not drink alcohol or use drugs.    Physical Exam:  Vitals:    07/24/19 1510   BP: (!) 144/82   Pulse: 89   Resp: 20   Weight: 116 kg (255 lb 13.5 oz)   Height: 5' 11" (1.803 m)   PainSc: 0-No pain     Body mass index is 35.68 kg/m².  General: Well developed, well nourished.  No acute distress.  Musculoskeletal: No obvious joint deformities, moves all extremities well.  Peripheral vascular: No edema noted    Neurological Exam:  General: well-developed, well-nourished, no distress  Mental status: Awake and alert  Speech language: No dysarthria or aphasia on conversation  Cranial nerves: Face symmetric  Motor: Moves all extremities well  Coordination: No ataxia. No tremor.         Data:  I have personally reviewed the referring provider's notes, labs, & imaging made available to me today.       Labs:  CBC:   Lab Results   Component Value Date    WBC 4.73 11/26/2018    HGB 13.7 (L) 11/26/2018    HCT 45.0 11/26/2018     11/26/2018    MCV 90 11/26/2018    RDW 14.4 " 11/26/2018     BMP:   Lab Results   Component Value Date     03/15/2019    K 5.0 03/15/2019     03/15/2019    CO2 29 03/15/2019    BUN 12 03/15/2019    CREATININE 1.1 03/15/2019    GLU 87 03/15/2019    CALCIUM 9.4 03/15/2019    MG 2.1 10/21/2018     LFTS;   Lab Results   Component Value Date    PROT 6.9 03/15/2019    ALBUMIN 3.8 03/15/2019    BILITOT 0.7 03/15/2019    AST 18 03/15/2019    ALKPHOS 73 03/15/2019    ALT 21 03/15/2019     COAGS: No results found for: INR, PROTIME, PTT  FLP:   Lab Results   Component Value Date    CHOL 131 03/15/2019    HDL 32 (L) 03/15/2019    LDLCALC 76.6 03/15/2019    TRIG 112 03/15/2019    CHOLHDL 24.4 03/15/2019         Imaging:  I have personally reviewed the imaging, and find the FDG PET is normal.     Assessment and Plan:  Mr. Pickard is a 63 y.o. male who underwent workup for cognitive concerns. His neuropsych testing was inconsistent and likely the patient's learning style and frustrations/fatgiue with testing contributed. His FDG PET scan was normal. I discussed that we will followup on a yearly basis and repeat MOCA scores yearly. If we see a decline, we can repeat neuropsych testing. We discussed staying physically, mentally, and socially engaged and eating a healthy diet.     Memory impairment      Greater than 50% of the patient's 15 minute clinic visit was spent on face to face counseling and arranging care.

## 2019-09-09 ENCOUNTER — OFFICE VISIT (OUTPATIENT)
Dept: ORTHOPEDICS | Facility: CLINIC | Age: 64
End: 2019-09-09
Payer: COMMERCIAL

## 2019-09-09 VITALS
BODY MASS INDEX: 35.81 KG/M2 | HEIGHT: 71 IN | SYSTOLIC BLOOD PRESSURE: 120 MMHG | WEIGHT: 255.75 LBS | HEART RATE: 81 BPM | DIASTOLIC BLOOD PRESSURE: 80 MMHG

## 2019-09-09 DIAGNOSIS — M65.331 TRIGGER FINGER, RIGHT MIDDLE FINGER: Primary | ICD-10-CM

## 2019-09-09 PROCEDURE — 3074F PR MOST RECENT SYSTOLIC BLOOD PRESSURE < 130 MM HG: ICD-10-PCS | Mod: CPTII,S$GLB,, | Performed by: ORTHOPAEDIC SURGERY

## 2019-09-09 PROCEDURE — 99203 OFFICE O/P NEW LOW 30 MIN: CPT | Mod: 25,S$GLB,, | Performed by: ORTHOPAEDIC SURGERY

## 2019-09-09 PROCEDURE — 99203 PR OFFICE/OUTPT VISIT, NEW, LEVL III, 30-44 MIN: ICD-10-PCS | Mod: 25,S$GLB,, | Performed by: ORTHOPAEDIC SURGERY

## 2019-09-09 PROCEDURE — 3079F PR MOST RECENT DIASTOLIC BLOOD PRESSURE 80-89 MM HG: ICD-10-PCS | Mod: CPTII,S$GLB,, | Performed by: ORTHOPAEDIC SURGERY

## 2019-09-09 PROCEDURE — 3079F DIAST BP 80-89 MM HG: CPT | Mod: CPTII,S$GLB,, | Performed by: ORTHOPAEDIC SURGERY

## 2019-09-09 PROCEDURE — 99999 PR PBB SHADOW E&M-EST. PATIENT-LVL III: ICD-10-PCS | Mod: PBBFAC,,, | Performed by: ORTHOPAEDIC SURGERY

## 2019-09-09 PROCEDURE — 3008F PR BODY MASS INDEX (BMI) DOCUMENTED: ICD-10-PCS | Mod: CPTII,S$GLB,, | Performed by: ORTHOPAEDIC SURGERY

## 2019-09-09 PROCEDURE — 20550 TENDON SHEATH: ICD-10-PCS | Mod: F7,S$GLB,, | Performed by: ORTHOPAEDIC SURGERY

## 2019-09-09 PROCEDURE — 20550 NJX 1 TENDON SHEATH/LIGAMENT: CPT | Mod: F7,S$GLB,, | Performed by: ORTHOPAEDIC SURGERY

## 2019-09-09 PROCEDURE — 3074F SYST BP LT 130 MM HG: CPT | Mod: CPTII,S$GLB,, | Performed by: ORTHOPAEDIC SURGERY

## 2019-09-09 PROCEDURE — 99999 PR PBB SHADOW E&M-EST. PATIENT-LVL III: CPT | Mod: PBBFAC,,, | Performed by: ORTHOPAEDIC SURGERY

## 2019-09-09 PROCEDURE — 3008F BODY MASS INDEX DOCD: CPT | Mod: CPTII,S$GLB,, | Performed by: ORTHOPAEDIC SURGERY

## 2019-09-09 RX ORDER — TRIAMCINOLONE ACETONIDE 40 MG/ML
40 INJECTION, SUSPENSION INTRA-ARTICULAR; INTRAMUSCULAR
Status: DISCONTINUED | OUTPATIENT
Start: 2019-09-09 | End: 2019-09-09 | Stop reason: HOSPADM

## 2019-09-09 RX ORDER — OLMESARTAN MEDOXOMIL, AMLODIPINE AND HYDROCHLOROTHIAZIDE TABLET 20/5/12.5 MG 20; 5; 12.5 MG/1; MG/1; MG/1
TABLET ORAL
Refills: 2 | COMMUNITY
Start: 2019-08-27 | End: 2020-05-04

## 2019-09-09 RX ADMIN — TRIAMCINOLONE ACETONIDE 40 MG: 40 INJECTION, SUSPENSION INTRA-ARTICULAR; INTRAMUSCULAR at 01:09

## 2019-09-09 NOTE — PROCEDURES
Tendon Sheath  Date/Time: 9/9/2019 1:44 PM  Performed by: Mauricio Maravilla MD  Authorized by: Mauricio Maravilla MD     Consent Done?: Yes (Verbal)  Timeout: prior to procedure the correct patient, procedure, and site was verified   Indications:  Pain  Site marked: the procedure site was marked    Timeout: prior to procedure the correct patient, procedure, and site was verified    Location:  Long finger  Long finger joint: Right middle finger flexor tendon sheath.  Prep: patient was prepped and draped in usual sterile fashion    Needle size:  25 G  Medications:  40 mg triamcinolone acetonide 40 mg/mL (20mg injected)  Patient tolerance:  Patient tolerated the procedure well with no immediate complications

## 2019-09-09 NOTE — PROGRESS NOTES
"9/9/2019    Chief Complaint:  Chief Complaint   Patient presents with    Right Middle Finger "locking" onset 2 - 3 weeks ago       HPI:  Tex Pickard III is a 63 y.o. male, who presents to clinic today has a history of his right middle finger locking up.  He states that this started approximately 2-3 weeks ago.  He has not had any treatment or evaluation.  States that this often happens at night with any grasping type activities.  He is here today for evaluation and treatment options.    PMHX:  Past Medical History:   Diagnosis Date    Arthritis     Colon polyps     Hypertension        PSHX:  Past Surgical History:   Procedure Laterality Date    CATARACT EXTRACTION W/  INTRAOCULAR LENS IMPLANT      curt    COLONOSCOPY N/A 4/14/2014    [facility]formed by To [facility] Jr[facility] at [facility]    COLONOSCOPY W/ POLYPECTOMY  9/26/2008  Juan    One 1 to 2 mm polyp in the hepatic flexure.  TUBULAR ADENOMA.   Redundant colon.   Otherwise, the colon and term ileum is normal.     EYE SURGERY      cataract OU    EYE SURGERY Bilateral     eye lid    GASTRIC BYPASS      LIPOSUCTION  2011    plastic surgery - abdominal dermolipectomy ("tummy tuck")    VASECTOMY         FMHX:  Family History   Problem Relation Age of Onset    Diabetes Father     Hypertension Mother     Dementia Mother         early 70's       SOCHX:  Social History     Tobacco Use    Smoking status: Former Smoker    Smokeless tobacco: Former User   Substance Use Topics    Alcohol use: No       ALLERGIES:  Cefaclor and Codeine    CURRENT MEDICATIONS:  Current Outpatient Medications on File Prior to Visit   Medication Sig Dispense Refill    aspirin 81 MG Chew Take 81 mg by mouth once daily.      finasteride (PROPECIA) 1 mg tablet Take 1 mg by mouth once daily.  3    multivitamin capsule Take 1 capsule by mouth once daily.      olmesartan-amLODIPin-hcthiazid 40-10-12.5 mg Tab TK ONE T PO QD  2    oxymetazoline (AFRIN) 0.05 % nasal spray 2 sprays " "by Nasal route every evening.      UNABLE TO FIND HCG (LYO) 6,000 IU INJECTABLE EMP: MIX WITH 6ML OF BACTERIOSTATIC WATER.  INJECT 0.5ML OR 50 UNITS ON AN INSULIN SYRINGE (500 IU OF HCG) SUBCUTANEOUSLY TWICE A WEEK      testosterone cypionate (DEPOTESTOTERONE CYPIONATE) 200 mg/mL injection Inject 0.5 mLs (100 mg total) into the muscle every 14 (fourteen) days. 10 mL 5    [DISCONTINUED] diltiaZEM (CARDIZEM CD) 180 MG 24 hr capsule TAKE ONE CAPSULE BY MOUTH EVERY DAY 90 capsule 3    [DISCONTINUED] olmesartan (BENICAR) 40 MG tablet Take 1 tablet (40 mg total) by mouth once daily. 90 tablet 3    [DISCONTINUED] oxybutynin (DITROPAN-XL) 10 MG 24 hr tablet TAKE 1 TABLET(10 MG) BY MOUTH EVERY DAY 90 tablet 3     No current facility-administered medications on file prior to visit.        REVIEW OF SYSTEMS:  Review of Systems   Constitutional: Negative for diaphoresis and fever.   HENT: Negative for ear pain, hearing loss, nosebleeds and tinnitus.    Eyes: Negative for pain and redness.   Respiratory: Negative for cough and shortness of breath.    Cardiovascular: Negative for chest pain and palpitations.   Gastrointestinal: Negative for blood in stool, constipation, diarrhea, nausea and vomiting.   Genitourinary: Negative for frequency and hematuria.   Musculoskeletal: Negative for back pain and myalgias.   Skin: Negative for itching and rash.   Neurological: Negative for dizziness, tingling, seizures, weakness and headaches.   Endo/Heme/Allergies: Negative for environmental allergies.   Psychiatric/Behavioral: The patient is not nervous/anxious.        GENERAL PHYSICAL EXAM:   /80   Pulse 81   Ht 5' 11" (1.803 m)   Wt 116 kg (255 lb 11.7 oz)   BMI 35.67 kg/m²    GEN: well developed, well nourished, no acute distress   HENT: Normocephalic, atraumatic   EYES: No discharge, conjunctiva normal   NECK: Supple, non-tender   PULM: No wheezing, no respiratory distress   CV: RRR   ABD: Soft, non-tender    ORTHO " EXAM:   Examination the right hand and wrist reveals that there is no edema.  There are no major skin changes.  Palpation does produce tenderness directly over the A1 pulley of the middle finger.  Flexion and extension of the fingers reveals that there is active triggering noted.  Patient does report grossly intact sensation in the median radial and ulnar distributions.  Capillary refill is less than 2 sec of all the digits.    RADIOLOGY:   None    ASSESSMENT:   Right middle finger triggering    PLAN:  1.  After informed consent was obtained and injection was placed to the right middle finger flexor tendon sheath.    2.  The patient will follow up with me on a p.r.n. Basis    Answers for HPI/ROS submitted by the patient on 9/6/2019   Hand injury  unexpected weight change: No  appetite change : No  sleep disturbance: No  IMMUNOCOMPROMISED: No  dysphoric mood: No  visual disturbance: No  sinus pressure : No  food allergies: No  difficulty urinating: No  numbness: No  joint swelling: Yes  Pain Chronicity: new  History of trauma: No  Onset: 1 to 4 weeks ago  Frequency: constantly  Progression since onset: unchanged  Injury mechanism: lifting  injury location: at home  pain- numeric: 4/10  pain location: right hand  pain quality: tightness  Radiating Pain: No  If your pain is radiating, to what part of the body?: right arm  Aggravating factors: extension  inability to bear weight: No  joint locking: Yes  limited range of motion: Yes  stiffness: Yes  Treatments tried: nothing  physical therapy: not tried  Improvement on treatment: no relief

## 2019-09-30 ENCOUNTER — TELEPHONE (OUTPATIENT)
Dept: GASTROENTEROLOGY | Facility: CLINIC | Age: 64
End: 2019-09-30

## 2019-09-30 NOTE — TELEPHONE ENCOUNTER
----- Message from Christin Dale sent at 9/30/2019  1:51 PM CDT -----  Contact: Patient  Type: Needs Medical Advice    Who Called: Patient  Best Call Back Number: [phone]  Additional Information: patient said he needs to reschedule his procedure due to his wife is having surgery he would like a call from the nurse please

## 2019-10-02 ENCOUNTER — TELEPHONE (OUTPATIENT)
Dept: GASTROENTEROLOGY | Facility: CLINIC | Age: 64
End: 2019-10-02

## 2019-10-07 ENCOUNTER — ANESTHESIA (OUTPATIENT)
Dept: ENDOSCOPY | Facility: HOSPITAL | Age: 64
End: 2019-10-07
Payer: COMMERCIAL

## 2019-10-07 ENCOUNTER — HOSPITAL ENCOUNTER (OUTPATIENT)
Facility: HOSPITAL | Age: 64
Discharge: HOME | End: 2019-10-07
Attending: INTERNAL MEDICINE | Admitting: INTERNAL MEDICINE
Payer: COMMERCIAL

## 2019-10-07 ENCOUNTER — ANESTHESIA EVENT (OUTPATIENT)
Dept: ENDOSCOPY | Facility: HOSPITAL | Age: 64
End: 2019-10-07
Payer: COMMERCIAL

## 2019-10-07 DIAGNOSIS — Z86.010 HX OF COLONIC POLYPS: ICD-10-CM

## 2019-10-07 PROBLEM — Z86.0100 HX OF COLONIC POLYPS: Status: ACTIVE | Noted: 2019-10-07

## 2019-10-07 PROCEDURE — 45385 COLONOSCOPY W/LESION REMOVAL: CPT | Mod: PO | Performed by: INTERNAL MEDICINE

## 2019-10-07 PROCEDURE — 88305 TISSUE EXAM BY PATHOLOGIST: CPT | Performed by: PATHOLOGY

## 2019-10-07 PROCEDURE — 27201089 HC SNARE, DISP (ANY): Mod: PO | Performed by: INTERNAL MEDICINE

## 2019-10-07 PROCEDURE — 63600175 PHARM REV CODE 636 W HCPCS: Mod: PO | Performed by: NURSE ANESTHETIST, CERTIFIED REGISTERED

## 2019-10-07 PROCEDURE — 37000008 HC ANESTHESIA 1ST 15 MINUTES: Mod: PO | Performed by: INTERNAL MEDICINE

## 2019-10-07 PROCEDURE — 88305 TISSUE EXAM BY PATHOLOGIST: CPT | Mod: 26,,, | Performed by: PATHOLOGY

## 2019-10-07 PROCEDURE — D9220A PRA ANESTHESIA: ICD-10-PCS | Mod: ANES,,, | Performed by: ANESTHESIOLOGY

## 2019-10-07 PROCEDURE — D9220A PRA ANESTHESIA: Mod: CRNA,,, | Performed by: NURSE ANESTHETIST, CERTIFIED REGISTERED

## 2019-10-07 PROCEDURE — D9220A PRA ANESTHESIA: ICD-10-PCS | Mod: CRNA,,, | Performed by: NURSE ANESTHETIST, CERTIFIED REGISTERED

## 2019-10-07 PROCEDURE — 88305 TISSUE SPECIMEN TO PATHOLOGY - SURGERY: ICD-10-PCS | Mod: 26,,, | Performed by: PATHOLOGY

## 2019-10-07 PROCEDURE — 63600175 PHARM REV CODE 636 W HCPCS: Mod: PO | Performed by: INTERNAL MEDICINE

## 2019-10-07 PROCEDURE — 45385 COLONOSCOPY W/LESION REMOVAL: CPT | Mod: 33,,, | Performed by: INTERNAL MEDICINE

## 2019-10-07 PROCEDURE — D9220A PRA ANESTHESIA: Mod: ANES,,, | Performed by: ANESTHESIOLOGY

## 2019-10-07 PROCEDURE — 37000009 HC ANESTHESIA EA ADD 15 MINS: Mod: PO | Performed by: INTERNAL MEDICINE

## 2019-10-07 PROCEDURE — 45385 PR COLONOSCOPY,REMV LESN,SNARE: ICD-10-PCS | Mod: 33,,, | Performed by: INTERNAL MEDICINE

## 2019-10-07 RX ORDER — SODIUM CHLORIDE 0.9 % (FLUSH) 0.9 %
10 SYRINGE (ML) INJECTION
Status: DISCONTINUED | OUTPATIENT
Start: 2019-10-07 | End: 2019-10-07 | Stop reason: HOSPADM

## 2019-10-07 RX ORDER — SODIUM CHLORIDE, SODIUM LACTATE, POTASSIUM CHLORIDE, CALCIUM CHLORIDE 600; 310; 30; 20 MG/100ML; MG/100ML; MG/100ML; MG/100ML
INJECTION, SOLUTION INTRAVENOUS CONTINUOUS
Status: DISCONTINUED | OUTPATIENT
Start: 2019-10-07 | End: 2019-10-07 | Stop reason: HOSPADM

## 2019-10-07 RX ORDER — LIDOCAINE HCL/PF 100 MG/5ML
SYRINGE (ML) INTRAVENOUS
Status: DISCONTINUED | OUTPATIENT
Start: 2019-10-07 | End: 2019-10-07

## 2019-10-07 RX ORDER — PROPOFOL 10 MG/ML
VIAL (ML) INTRAVENOUS
Status: DISCONTINUED | OUTPATIENT
Start: 2019-10-07 | End: 2019-10-07

## 2019-10-07 RX ADMIN — PROPOFOL 50 MG: 10 INJECTION, EMULSION INTRAVENOUS at 12:10

## 2019-10-07 RX ADMIN — SODIUM CHLORIDE, SODIUM LACTATE, POTASSIUM CHLORIDE, AND CALCIUM CHLORIDE: .6; .31; .03; .02 INJECTION, SOLUTION INTRAVENOUS at 11:10

## 2019-10-07 RX ADMIN — PROPOFOL 100 MG: 10 INJECTION, EMULSION INTRAVENOUS at 12:10

## 2019-10-07 RX ADMIN — LIDOCAINE HYDROCHLORIDE 100 MG: 20 INJECTION, SOLUTION INTRAVENOUS at 12:10

## 2019-10-07 NOTE — [employer] PATIENT INSTRUCTIONS
Discharge Summary/Instructions for after Colonoscopy with   Biopsy/Polypectomy  Tex Pickard    Monday, October 07, 2019  To Martinez MD  RESTRICTIONS ON ACTIVITY:  - Do not drive a car or operate machinery until the day after the procedure.      - The following day: return to full activity including work.  - For  3 days: No heavy lifting, straining or running.  - Diet: You can have solid foods, but no gassy foods (i.e. beans, broccoli,   cabbage, etc).  TREATMENT FOR COMMON SIDE EFFECTS:  - Mild abdominal pain and bloating or excessive gas: rest, eat lightly and   use a heating pad.  SYMPTOMS TO WATCH FOR AND REPORT TO YOUR PHYSICIAN:  1. Severe abdominal pain.  2. Fever within 24 hours after a procedure.  3. A large amount of rectal bleeding. (A small amount of blood from the   rectum is not serious, especially if hemorrhoids are present.  3.  Because air was put into your colon during the procedure, expelling   large amounts of air from your rectum is normal.  4.  You may not have a bowel movement for 1-3 days because of the   colonoscopy prep.  This is normal.  5.  Call immediately if you notice any of the following:   Chills and/or fever over 101   Persistent vomiting   Severe abdominal pain, other than gas cramps   Severe chest pain   Black, tarry stools   Any bleeding - exceeding one tablespoon  Your doctor recommends these additional instructions:  We are waiting for your pathology results.   If the pathology report reveals adenomatous (precancerous) tissue, then your   physician recommends a repeat colonoscopy for surveillance in 5 years.   If the pathology report indicates a hyperplastic polyp, then the colonoscopy   will be repeated for surveillance in 7-8 years.   Eat a high fiber diet.   Take a fiber supplement, for example Citrucel, Fibercon, Konsyl or   Metamucil.   Take a PROBIOTIC, such as a carton of GREEK YOGURT (Chobani or Oikos,  or   Activia or Dannon);  or tablets of ALIGN or  CULTURELLE or LOUIS-Q (all   non-prescription), every day for a month.   And repeat this 3-4 times a   year.  None  If you have any questions or problems, please call your physician.  EMERGENCY PHONE NUMBER: [phone]  LAB RESULTS: Call in two (2) weeks for lab results, [phone]  ___________________________________________  Nurse Signature  ___________________________________________  Patient/Designated Responsible Party Signature  To Martinez MD  10/7/2019 1:06:36 PM  This report has been verified and signed electronically.  [employer]

## 2019-10-07 NOTE — DISCHARGE INSTRUCTIONS
Recovery After Procedural Sedation (Adult)  You have been given medicine by vein to make you sleep during your surgery. This may have included both a pain medicine and sleeping medicine. Most of the effects have worn off. But you may still have some drowsiness for the next 6 to 8 hours.  Home care  Follow these guidelines when you get home:  · For the next 8 hours, you should be watched by a responsible adult. This person should make sure your condition is not getting worse.  · Don't drink any alcohol for the next 24 hours.  · Don't drive, operate dangerous machinery, or make important business or personal decisions during the next 24 hours.  Note: Your healthcare provider may tell you not to take any medicine by mouth for pain or sleep in the next 4 hours. These medicines may react with the medicines you were given in the hospital. This could cause a much stronger response than usual.  Follow-up care  Follow up with your healthcare provider if you are not alert and back to your usual level of activity within 12 hours.  When to seek medical advice  Call your healthcare provider right away if any of these occur:  · Drowsiness gets worse  · Weakness or dizziness gets worse  · Repeated vomiting  · You can't be awakened   Date Last Reviewed: 10/18/2016  © 3214-3766 [employer]. [address] [place] [address], [place] [address]. All rights reserved. This information is not intended as a substitute for professional medical care. Always follow your healthcare professional's instructions.        High-Fiber Diet  Fiber is in fruits, vegetables, cereals, and grains. Fiber passes through your body undigested. A high-fiber diet helps food move through your intestinal tract. The added bulk is helpful in preventing constipation. In people with diverticulosis, fiber helps clean out the pouches along the colon wall. It also prevents new pouches from forming. A high-fiber diet reduces the risk of colon cancer. It also lowers  blood cholesterol and prevents high blood sugar in people with diabetes.    The fiber-rich foods listed below should be part of your diet. If you are not used to high-fiber foods, start with 1 or 2 foods from this list. Every 3 to 4 days add a new one to your diet. Do this until you are eating 4 high-fiber foods per day. This should give you 20 to 35 grams of fiber a day. It is also important to drink a lot of water when you are on this diet. You should have 6 to 8 glasses of water a day. Water makes the fiber swell and increases the benefit.  Foods high in dietary fiber  The following foods are high in dietary fiber:  · Breads. Breads made with 100% whole-wheat flour; ulises, wheat, or rye crackers; whole-grain tortillas, bran muffins.  · Cereals. Whole-grain and bran cereals with bran (shredded wheat, wheat flakes, raisin bran, corn bran); oatmeal, rolled oats, granola, and brown rice.  · Fruits. Fresh fruits and their edible skins (pears, prunes, raisins, berries, apples, and apricots); bananas, citrus fruit, mangoes, pineapple; and prune juice.  · Nuts. Any nuts and seeds.  · Vegetables. Best served raw or lightly cooked. All types, especially: green peas, celery, eggplant, potatoes, spinach, broccoli, Sterrett sprouts, winter squash, carrots, cauliflower, soybeans, lentils, and fresh and dried beans of all kinds.  · Other. Popcorn, any spices.  Date Last Reviewed: 8/1/2016  © 4766-8251 [employer]. [address] [place] [address], [place] [address]. All rights reserved. This information is not intended as a substitute for professional medical care. Always follow your healthcare professional's instructions.

## 2019-10-07 NOTE — DISCHARGE SUMMARY
Discharge Note  Short Stay      SUMMARY     Admit Date: 10/7/2019    Attending Physician: To Martinez Jr., MD     Discharge Physician: To Martinez Jr., MD    Discharge Date: 10/7/2019 1:08 PM    Final Diagnosis: Screening for colon cancer [Z12.11]  Impression:          - One 2 mm polyp in the proximal transverse colon,                        removed with a cold snare. Resected and retrieved.                       - One 2 mm polyp in the proximal ascending colon,                        removed with a cold snare. Resected and retrieved.                       - Redundant colon.                       - Non-bleeding internal hemorrhoids.                       - The examination was otherwise normal.                       - The examined portion of the ileum was normal.  Recommendation:      - Discharge patient to home.                       - Await pathology results.                       - If the pathology report reveals adenomatous                        tissue, then repeat the colonoscopy for surveillance                        in 5 years.                       - If the pathology report indicates hyperplastic                        polyp, then repeat colonoscopy for surveillance in 7                        years.                       - High fiber diet.                       - Use fiber, for example Citrucel, Fibercon, Konsyl                        or Metamucil.                       - Take a PROBIOTIC, such as a carton of GREEK YOGURT                        (Chobani or Oikos, or Activia or Dannon); or tablets                        of ALIGN or CULTURELLE or LOUIS-Q (all                        non-prescription), every day for a month.                       - Call the [facility] in 2 weeks for reports (if                        you haven't heard from us sooner) [phone].                       - Continue present medications.                       - Patient has a contact number available for                         emergencies. The signs and symptoms of potential                        delayed complications were discussed with the                        patient. Return to normal activities tomorrow.                        Written discharge instructions were provided to the                        patient.                       - Return to normal activities tomorrow.  To Martinez MD  10/7/2019   Disposition: HOME OR SELF CARE    Patient Instructions:   Current Discharge Medication List      CONTINUE these medications which have NOT CHANGED    Details   aspirin 81 MG Chew Take 81 mg by mouth once daily.      finasteride (PROPECIA) 1 mg tablet Take 1 mg by mouth once daily.  Refills: 3      multivitamin capsule Take 1 capsule by mouth once daily.      olmesartan-amLODIPin-hcthiazid 20-5-12.5 mg Tab Refills: 2      oxymetazoline (AFRIN) 0.05 % nasal spray 2 sprays by Nasal route every evening.      testosterone cypionate (DEPOTESTOTERONE CYPIONATE) 200 mg/mL injection Inject 0.5 mLs (100 mg total) into the muscle every 14 (fourteen) days.  Qty: 10 mL, Refills: 5      UNABLE TO FIND HCG (LYO) 6,000 IU INJECTABLE EMP: MIX WITH 6ML OF BACTERIOSTATIC WATER.  INJECT 0.5ML OR 50 UNITS ON AN INSULIN SYRINGE (500 IU OF HCG) SUBCUTANEOUSLY TWICE A WEEK             Discharge Procedure Orders (must include Diet, Follow-up, Activity)    Follow Up:  Follow up with PCP as per your routine.  Please follow a high fiber diet.  Activity as tolerated.    No driving day of procedure.

## 2019-10-07 NOTE — ANESTHESIA PREPROCEDURE EVALUATION
10/07/2019  Tex Pickard III is a 64 y.o., male.    Anesthesia Evaluation    I have reviewed the Patient Summary Reports.    I have reviewed the Nursing Notes.   I have reviewed the Medications.     Review of Systems  Anesthesia Hx:  No problems with previous Anesthesia Denies Hx of Anesthetic complications    Social:  Former Smoker Smoking Status: Former Smoker  Smokeless Tobacco Status: Former User  Alcohol use: No  Drug use: No       Cardiovascular:   Denies Hypertension.  Denies MI.  Denies CAD.    Denies CABG/stent.   Denies Angina.    Pulmonary:   Denies COPD.  Denies Asthma.  Denies Recent URI.    Renal/:   Denies Chronic Renal Disease.     Hepatic/GI:   Denies GERD. Denies Liver Disease.    Neurological:   Denies TIA. Denies CVA. Denies Seizures.    Endocrine:   Denies Diabetes. Denies Hypothyroidism.    Psych:   Denies Psychiatric History.          Physical Exam  General:  Obesity    Airway/Jaw/Neck:  Airway Findings: Mouth Opening: Normal Tongue: Normal  General Airway Assessment: Adult, Good  Mallampati: II  Improves to II with phonation.  TM Distance: 4-6 cm      Dental:  Dental Findings: In tact   Chest/Lungs:  Chest/Lungs Findings: Clear to auscultation, Normal Respiratory Rate     Heart/Vascular:  Heart Findings: Rate: Normal  Rhythm: Regular Rhythm  Sounds: Normal  Heart murmur: negative       Mental Status:  Mental Status Findings:  Cooperative, Alert and Oriented         Anesthesia Plan  Type of Anesthesia, risks & benefits discussed:  Anesthesia Type:  general  Patient's Preference:   Intra-op Monitoring Plan: standard ASA monitors  Intra-op Monitoring Plan Comments:   Post Op Pain Control Plan:   Post Op Pain Control Plan Comments:   Induction:   IV  Beta Blocker:  Patient is not currently on a Beta-Blocker (No further documentation required).       Informed Consent: Patient  understands risks and agrees with Anesthesia plan.  Questions answered. Anesthesia consent signed with patient.  ASA Score: 2     Day of Surgery Review of History & Physical: I have interviewed and examined the patient. I have reviewed the patient's H&P dated:  There are no significant changes.  H&P update referred to the surgeon.         Ready For Surgery From Anesthesia Perspective.

## 2019-10-07 NOTE — ANESTHESIA POSTPROCEDURE EVALUATION
Anesthesia Post Evaluation    Patient: Tex Pickard III    Procedure(s) Performed: Procedure(s) (LRB):  COLONOSCOPY (N/A)    Final Anesthesia Type: general  Patient location during evaluation: PACU  Patient participation: Yes- Able to Participate  Level of consciousness: awake and alert and oriented  Post-procedure vital signs: reviewed and stable  Pain management: adequate  Airway patency: patent  PONV status at discharge: No PONV  Anesthetic complications: no      Cardiovascular status: blood pressure returned to baseline  Respiratory status: unassisted, spontaneous ventilation and room air  Hydration status: euvolemic  Follow-up not needed.          Vitals Value Taken Time   /78 10/7/2019  1:16 PM   Temp 36.6 °C (97.8 °F) 10/7/2019 12:50 PM   Pulse 68 10/7/2019  1:16 PM   Resp 18 10/7/2019  1:16 PM   SpO2 98 % 10/7/2019  1:16 PM         Event Time     Out of Recovery 13:30:00          Pain/Juhi Score: Juhi Score: 9 (10/7/2019 12:50 PM)

## 2019-10-07 NOTE — H&P
History & Physical - Short Stay  Gastroenterology      SUBJECTIVE:     Procedure: Colonoscopy    Chief Complaint/Indication for Procedure: Screening    History of Present Illness:  Asymptomatic    Rina Bojorquez LPN   July 10, 2019   4:38 PM   Note      ----- Message from RT Ha sent at 7/9/2019  4:25 PM CDT -----  Contact: pt    pt , requesting to schedule colonoscopy apptalison.           See last colonoscopy 4/14/2014:  Impression:  - One 2 mm polyp at the hepatic flexure. Resected                        and retrieved.                       - Internal hemorrhoids.                       - Redundant colon.                       - The examination was otherwise normal.                       - The examined portion of the ileum was normal.  Recommendation:      - Discharge patient to home.                       - Await pathology results.                       - If the pathology report reveals adenomatous                        tissue, then repeat the colonoscopy for surveillance                        in 4-5 years.                       - If the pathology report indicates hyperplastic                        polyp, then repeat colonoscopy for surveillance in                        6-7 years.                       - High fiber diet.                       - Take a PROBIOTIC, such as a carton of GREEK YOGURT                        (Chobani or Oikos, or Activia or Dannon); or tablets                        of ALIGN or CULTURELLE or LOUIS-Q (all                        non-prescription), every day for a month.                       - Call the [facility] clinic in 2 weeks for reports (if                        you haven't heard from us sooner) [phone].                       - Continue present medications.                       - Patient has a contact number available for                        emergencies. The signs and symptoms of potential                        delayed complications were  "discussed with the                        patient. Return to normal activities tomorrow.                        Written discharge instructions were provided to the                        patient.  To Martinez MD  4/14/2014    SPECIMEN  1) Hepatic flexure.  FINAL PATHOLOGIC DIAGNOSIS  Hepatic flexure, polyp, biopsy:  Tubular adenoma, fragmented.        PTA Medications   Medication Sig    aspirin 81 MG Chew Take 81 mg by mouth once daily.    finasteride (PROPECIA) 1 mg tablet Take 1 mg by mouth once daily.    multivitamin capsule Take 1 capsule by mouth once daily.    olmesartan-amLODIPin-hcthiazid 20-5-12.5 mg Tab     oxymetazoline (AFRIN) 0.05 % nasal spray 2 sprays by Nasal route every evening.    testosterone cypionate (DEPOTESTOTERONE CYPIONATE) 200 mg/mL injection Inject 0.5 mLs (100 mg total) into the muscle every 14 (fourteen) days.    UNABLE TO FIND HCG (LYO) 6,000 IU INJECTABLE EMP: MIX WITH 6ML OF BACTERIOSTATIC WATER.  INJECT 0.5ML OR 50 UNITS ON AN INSULIN SYRINGE (500 IU OF HCG) SUBCUTANEOUSLY TWICE A WEEK       Review of patient's allergies indicates:   Allergen Reactions    Cefaclor Hives     ceclor    Codeine Hives        Past Medical History:   Diagnosis Date    Arthritis     Colon polyps     Hypertension     Sleep apnea      Past Surgical History:   Procedure Laterality Date    CATARACT EXTRACTION W/  INTRAOCULAR LENS IMPLANT      curt    COLONOSCOPY W/ POLYPECTOMY  9/26/2008  Juan    One 1 to 2 mm polyp in the hepatic flexure.  TUBULAR ADENOMA.   Redundant colon.   Otherwise, the colon and term ileum is normal.     EYE SURGERY      cataract OU    EYE SURGERY Bilateral     eye lid    GASTRIC BYPASS      LIPOSUCTION  2011    plastic surgery - abdominal dermolipectomy ("tummy tuck")    VASECTOMY       Family History   Problem Relation Age of Onset    Diabetes Father     Hypertension Mother     Dementia Mother         early 70's     Social History     Tobacco Use    " "Smoking status: Former Smoker    Smokeless tobacco: Former User   Substance Use Topics    Alcohol use: No    Drug use: No         OBJECTIVE:     Vital Signs (Most Recent)  Temp: 97.7 °F (36.5 °C) (10/07/19 1121)  Pulse: (!) 54 (10/07/19 1121)  Resp: 16 (10/07/19 1121)  BP: 138/67 (10/07/19 1121)  SpO2: 96 % (10/07/19 1121)    Physical Exam:  : Ht 5' 11" (1.803 m)   Wt 116 kg (255 lb 11.7 oz)   BMI 35.67 kg/m²                  GENERAL:  Comfortable, in no acute distress.                                 HEENT EXAM:  Nonicteric.  No adenopathy.  Oropharynx is clear.               NECK:  Supple.                                                               LUNGS:  Clear.                                                               CARDIAC:  Regular rate and rhythm.  S1, S2.  No murmur.                      ABDOMEN:  Soft, positive bowel sounds, nontender.  No hepatosplenomegaly or masses.  No rebound or guarding.                              EXTREMITIES:  No edema.     MENTAL STATUS:  Alert and oriented.    ASSESSMENT/PLAN:     Assessment: Colorectal cancer screening    Plan: Colonoscopy    Anesthesia Plan:   MAC / General Anaesthesia    ASA Grade: ASA 2 - Patient with mild systemic disease with no functional limitations    MALLAMPATI SCORE: II (hard and soft palate, upper portion of tonsils anduvula visible)    "

## 2019-10-08 VITALS
WEIGHT: 245 LBS | DIASTOLIC BLOOD PRESSURE: 78 MMHG | TEMPERATURE: 98 F | BODY MASS INDEX: 34.3 KG/M2 | OXYGEN SATURATION: 98 % | SYSTOLIC BLOOD PRESSURE: 120 MMHG | HEART RATE: 68 BPM | HEIGHT: 71 IN | RESPIRATION RATE: 18 BRPM

## 2020-01-03 ENCOUNTER — OFFICE VISIT (OUTPATIENT)
Dept: FAMILY MEDICINE | Facility: CLINIC | Age: 65
End: 2020-01-03
Payer: COMMERCIAL

## 2020-01-03 VITALS
WEIGHT: 256.19 LBS | HEART RATE: 58 BPM | BODY MASS INDEX: 35.73 KG/M2 | DIASTOLIC BLOOD PRESSURE: 88 MMHG | SYSTOLIC BLOOD PRESSURE: 130 MMHG | OXYGEN SATURATION: 98 % | TEMPERATURE: 98 F

## 2020-01-03 DIAGNOSIS — J34.89 SINUS PRESSURE: Primary | ICD-10-CM

## 2020-01-03 PROCEDURE — 3075F PR MOST RECENT SYSTOLIC BLOOD PRESS GE 130-139MM HG: ICD-10-PCS | Mod: CPTII,S$GLB,, | Performed by: NURSE PRACTITIONER

## 2020-01-03 PROCEDURE — 3075F SYST BP GE 130 - 139MM HG: CPT | Mod: CPTII,S$GLB,, | Performed by: NURSE PRACTITIONER

## 2020-01-03 PROCEDURE — 96372 THER/PROPH/DIAG INJ SC/IM: CPT | Mod: S$GLB,,, | Performed by: NURSE PRACTITIONER

## 2020-01-03 PROCEDURE — 99999 PR PBB SHADOW E&M-EST. PATIENT-LVL III: ICD-10-PCS | Mod: PBBFAC,,, | Performed by: NURSE PRACTITIONER

## 2020-01-03 PROCEDURE — 99999 PR PBB SHADOW E&M-EST. PATIENT-LVL III: CPT | Mod: PBBFAC,,, | Performed by: NURSE PRACTITIONER

## 2020-01-03 PROCEDURE — 3008F BODY MASS INDEX DOCD: CPT | Mod: CPTII,S$GLB,, | Performed by: NURSE PRACTITIONER

## 2020-01-03 PROCEDURE — 3008F PR BODY MASS INDEX (BMI) DOCUMENTED: ICD-10-PCS | Mod: CPTII,S$GLB,, | Performed by: NURSE PRACTITIONER

## 2020-01-03 PROCEDURE — 3079F DIAST BP 80-89 MM HG: CPT | Mod: CPTII,S$GLB,, | Performed by: NURSE PRACTITIONER

## 2020-01-03 PROCEDURE — 3079F PR MOST RECENT DIASTOLIC BLOOD PRESSURE 80-89 MM HG: ICD-10-PCS | Mod: CPTII,S$GLB,, | Performed by: NURSE PRACTITIONER

## 2020-01-03 PROCEDURE — 99214 PR OFFICE/OUTPT VISIT, EST, LEVL IV, 30-39 MIN: ICD-10-PCS | Mod: 25,S$GLB,, | Performed by: NURSE PRACTITIONER

## 2020-01-03 PROCEDURE — 99214 OFFICE O/P EST MOD 30 MIN: CPT | Mod: 25,S$GLB,, | Performed by: NURSE PRACTITIONER

## 2020-01-03 PROCEDURE — 96372 PR INJECTION,THERAP/PROPH/DIAG2ST, IM OR SUBCUT: ICD-10-PCS | Mod: S$GLB,,, | Performed by: NURSE PRACTITIONER

## 2020-01-03 RX ORDER — AMOXICILLIN 875 MG/1
875 TABLET, FILM COATED ORAL
COMMUNITY
End: 2020-01-24

## 2020-01-03 RX ORDER — BETAMETHASONE SODIUM PHOSPHATE AND BETAMETHASONE ACETATE 3; 3 MG/ML; MG/ML
6 INJECTION, SUSPENSION INTRA-ARTICULAR; INTRALESIONAL; INTRAMUSCULAR; SOFT TISSUE
Status: COMPLETED | OUTPATIENT
Start: 2020-01-03 | End: 2020-01-03

## 2020-01-03 RX ADMIN — BETAMETHASONE SODIUM PHOSPHATE AND BETAMETHASONE ACETATE 6 MG: 3; 3 INJECTION, SUSPENSION INTRA-ARTICULAR; INTRALESIONAL; INTRAMUSCULAR; SOFT TISSUE at 10:01

## 2020-01-03 NOTE — PROGRESS NOTES
Subjective:       Patient ID: Tex Pickard III is a 64 y.o. male.    Chief Complaint: Nasal Congestion and Cough (productive cough )    HPI   Mr. Pickard is a known patient to me. He presents today for sinus pressure. Diagnosed with right middle ear infection at urgent care 12/27--received amoxicillin Rx. He reports gradual improvement in ear pain. Sinus pressure started within the past 3 days--denies discolored sputum. Has tried claritin without relief   Vitals:    01/03/20 1001   BP: 130/88   Pulse: (!) 58   Temp: 98.1 °F (36.7 °C)     Review of Systems   Constitutional: Negative for chills and diaphoresis.   HENT: Positive for ear pain, postnasal drip, rhinorrhea and sinus pressure. Negative for facial swelling, sore throat and trouble swallowing.    Eyes: Negative for discharge and redness.   Respiratory: Negative for shortness of breath and wheezing.    Cardiovascular: Negative for chest pain and palpitations.   Gastrointestinal: Negative for diarrhea.   Genitourinary: Negative for difficulty urinating.   Musculoskeletal: Negative for gait problem and myalgias.   Skin: Negative for pallor and rash.   Allergic/Immunologic: Negative for environmental allergies.   Neurological: Positive for headaches. Negative for facial asymmetry and speech difficulty.   Psychiatric/Behavioral: Negative for confusion. The patient is not nervous/anxious.        Past Medical History:   Diagnosis Date    Arthritis     Colon polyps     Hypertension     Sleep apnea      Objective:      Physical Exam   Constitutional: He is oriented to person, place, and time. He does not have a sickly appearance. No distress.   HENT:   Head: Normocephalic.   Right Ear: Hearing, external ear and ear canal normal. A middle ear effusion is present.   Left Ear: Hearing, tympanic membrane, external ear and ear canal normal.   Nose: Mucosal edema present. Right sinus exhibits maxillary sinus tenderness. Left sinus exhibits maxillary sinus  tenderness.   Mouth/Throat: Uvula is midline, oropharynx is clear and moist and mucous membranes are normal.   Eyes: Conjunctivae and lids are normal.   Neck: No JVD present. No tracheal deviation present.   Cardiovascular: Normal rate and normal heart sounds.   Pulmonary/Chest: Effort normal and breath sounds normal.   Abdominal: He exhibits no distension.   Musculoskeletal: He exhibits no deformity.   Neurological: He is alert and oriented to person, place, and time.   Skin: He is not diaphoretic. No pallor.   Psychiatric: He has a normal mood and affect. His speech is normal and behavior is normal. Judgment and thought content normal. Cognition and memory are normal.   Nursing note and vitals reviewed.      Assessment:       1. Sinus pressure        Plan:       Sinus pressure  -     betamethasone acetate-betamethasone sodium phosphate injection 6 mg    finish amoxil as prescribed  Educated on supportive care, otc meds, return precautions         Follow up for further evaluation if s/s worsen, fail to improve, or new symptoms arise.    Medication List with Changes/Refills   Current Medications    AMOXICILLIN (AMOXIL) 875 MG TABLET    Take 875 mg by mouth every 12 (twelve) hours.    ASPIRIN 81 MG CHEW    Take 81 mg by mouth once daily.    FINASTERIDE (PROPECIA) 1 MG TABLET    Take 1 mg by mouth once daily.    MULTIVITAMIN CAPSULE    Take 1 capsule by mouth once daily.    OLMESARTAN-AMLODIPIN-HCTHIAZID 20-5-12.5 MG TAB        OXYMETAZOLINE (AFRIN) 0.05 % NASAL SPRAY    2 sprays by Nasal route every evening.    TESTOSTERONE CYPIONATE (DEPOTESTOTERONE CYPIONATE) 200 MG/ML INJECTION    Inject 0.5 mLs (100 mg total) into the muscle every 14 (fourteen) days.    UNABLE TO FIND    HCG (LYO) 6,000 IU INJECTABLE EMP: MIX WITH 6ML OF BACTERIOSTATIC WATER.  INJECT 0.5ML OR 50 UNITS ON AN INSULIN SYRINGE (500 IU OF HCG) SUBCUTANEOUSLY TWICE A WEEK

## 2020-01-24 ENCOUNTER — TELEPHONE (OUTPATIENT)
Dept: FAMILY MEDICINE | Facility: CLINIC | Age: 65
End: 2020-01-24

## 2020-01-24 ENCOUNTER — OFFICE VISIT (OUTPATIENT)
Dept: FAMILY MEDICINE | Facility: CLINIC | Age: 65
End: 2020-01-24
Payer: COMMERCIAL

## 2020-01-24 VITALS
HEART RATE: 64 BPM | SYSTOLIC BLOOD PRESSURE: 122 MMHG | BODY MASS INDEX: 35.51 KG/M2 | DIASTOLIC BLOOD PRESSURE: 84 MMHG | OXYGEN SATURATION: 95 % | TEMPERATURE: 98 F | WEIGHT: 254.63 LBS

## 2020-01-24 DIAGNOSIS — I10 ESSENTIAL HYPERTENSION: ICD-10-CM

## 2020-01-24 DIAGNOSIS — K64.4 EXTERNAL HEMORRHOID: Primary | ICD-10-CM

## 2020-01-24 PROCEDURE — 99999 PR PBB SHADOW E&M-EST. PATIENT-LVL III: ICD-10-PCS | Mod: PBBFAC,,, | Performed by: PHYSICIAN ASSISTANT

## 2020-01-24 PROCEDURE — 3074F PR MOST RECENT SYSTOLIC BLOOD PRESSURE < 130 MM HG: ICD-10-PCS | Mod: CPTII,S$GLB,, | Performed by: PHYSICIAN ASSISTANT

## 2020-01-24 PROCEDURE — 3008F BODY MASS INDEX DOCD: CPT | Mod: CPTII,S$GLB,, | Performed by: PHYSICIAN ASSISTANT

## 2020-01-24 PROCEDURE — 3079F DIAST BP 80-89 MM HG: CPT | Mod: CPTII,S$GLB,, | Performed by: PHYSICIAN ASSISTANT

## 2020-01-24 PROCEDURE — 3074F SYST BP LT 130 MM HG: CPT | Mod: CPTII,S$GLB,, | Performed by: PHYSICIAN ASSISTANT

## 2020-01-24 PROCEDURE — 3079F PR MOST RECENT DIASTOLIC BLOOD PRESSURE 80-89 MM HG: ICD-10-PCS | Mod: CPTII,S$GLB,, | Performed by: PHYSICIAN ASSISTANT

## 2020-01-24 PROCEDURE — 99213 OFFICE O/P EST LOW 20 MIN: CPT | Mod: S$GLB,,, | Performed by: PHYSICIAN ASSISTANT

## 2020-01-24 PROCEDURE — 99213 PR OFFICE/OUTPT VISIT, EST, LEVL III, 20-29 MIN: ICD-10-PCS | Mod: S$GLB,,, | Performed by: PHYSICIAN ASSISTANT

## 2020-01-24 PROCEDURE — 99999 PR PBB SHADOW E&M-EST. PATIENT-LVL III: CPT | Mod: PBBFAC,,, | Performed by: PHYSICIAN ASSISTANT

## 2020-01-24 PROCEDURE — 3008F PR BODY MASS INDEX (BMI) DOCUMENTED: ICD-10-PCS | Mod: CPTII,S$GLB,, | Performed by: PHYSICIAN ASSISTANT

## 2020-01-24 RX ORDER — NEBIVOLOL HYDROCHLORIDE 20 MG/1
20 TABLET ORAL
COMMUNITY
Start: 2020-01-16

## 2020-01-24 RX ORDER — LOSARTAN POTASSIUM 50 MG/1
25 TABLET ORAL DAILY
COMMUNITY
Start: 2019-11-06 | End: 2022-08-01

## 2020-01-24 RX ORDER — HYDROCORTISONE 25 MG/G
CREAM TOPICAL 2 TIMES DAILY
Qty: 1 TUBE | Refills: 1 | Status: SHIPPED | OUTPATIENT
Start: 2020-01-24 | End: 2021-03-08

## 2020-01-24 NOTE — PROGRESS NOTES
Subjective:      Patient ID: Tex Pickard III is a 64 y.o. male.    Chief Complaint: Hemorrhoids (popped back up last week, was cut and drained before a while back)  Patient is new to me.    HPI   Patient has had a history of hemorrhoids that have been drained in the past.  Pain 7/10 during bowel movement.  Using rectal suppository yesterday with no relief.  No bleeding noted.    Review of Systems   Constitutional: Negative for chills and fever.   HENT: Positive for ear pain (right fluid). Negative for rhinorrhea.    Respiratory: Negative for cough and shortness of breath.    Cardiovascular: Negative for chest pain.   Gastrointestinal: Negative for abdominal pain, anal bleeding, blood in stool, constipation, diarrhea, nausea and vomiting.       Objective:   /84 (BP Location: Left arm, Patient Position: Sitting)   Pulse 64   Temp 98.2 °F (36.8 °C)   Wt 115.5 kg (254 lb 10.1 oz)   SpO2 95%   BMI 35.51 kg/m²      Physical Exam   Constitutional: He is oriented to person, place, and time. Vital signs are normal. He appears well-developed and well-nourished. He is active and cooperative. No distress.   HENT:   Head: Normocephalic and atraumatic.   Right Ear: Hearing, tympanic membrane, external ear and ear canal normal.   Left Ear: Hearing, tympanic membrane, external ear and ear canal normal.   Nose: Nose normal.   Eyes: Conjunctivae and lids are normal.   Neck: Normal range of motion and phonation normal.   Cardiovascular: Normal rate, regular rhythm and normal heart sounds. Exam reveals no gallop and no friction rub.   No murmur heard.  Pulmonary/Chest: Effort normal and breath sounds normal. No stridor. No respiratory distress. He has no decreased breath sounds. He has no wheezes. He has no rhonchi. He has no rales.   Genitourinary: Rectal exam shows external hemorrhoid (No bleeding noted.  3cm hemorrhoid to left of anus.) and tenderness.   Genitourinary Comments: Sarai Julien MA, was my chaperone  during this exam.   Musculoskeletal: Normal range of motion.   Neurological: He is alert and oriented to person, place, and time.   Skin: Skin is warm, dry and intact. No rash noted.   Psychiatric: He has a normal mood and affect. His speech is normal and behavior is normal. Judgment and thought content normal.   Vitals reviewed.     Assessment:      1. External hemorrhoid    2. Essential hypertension       Plan:   1. External hemorrhoid  Use Miralax 1/2 capful in tall glass of water daily.  Apply hydrocortisone to hemorrhoid twice a day for 10 days.  - hydrocortisone 2.5 % cream; Apply topically 2 (two) times daily. for 10 days  Dispense: 1 Tube; Refill: 1  - Ambulatory referral to General Surgery    2. Essential hypertension  Controlled with current medication.    Follow up as needed.  Patient agreed with plan and expressed understanding.

## 2020-01-24 NOTE — TELEPHONE ENCOUNTER
Patient called requesting appt for a hemorrhoid issue, wanted to be seen same day. Appt scheduled.

## 2020-01-24 NOTE — PATIENT INSTRUCTIONS
Use Miralax 1/2 capful in tall glass of water daily.    Apply hydrocortisone to hemorrhoid twice a day for 10 days.    Thanks for seeing me,  Sara Monahan PA-C

## 2020-02-06 ENCOUNTER — OFFICE VISIT (OUTPATIENT)
Dept: SURGERY | Facility: CLINIC | Age: 65
End: 2020-02-06
Payer: COMMERCIAL

## 2020-02-06 VITALS
DIASTOLIC BLOOD PRESSURE: 93 MMHG | HEIGHT: 71 IN | HEART RATE: 55 BPM | BODY MASS INDEX: 36.23 KG/M2 | TEMPERATURE: 98 F | WEIGHT: 258.81 LBS | SYSTOLIC BLOOD PRESSURE: 146 MMHG

## 2020-02-06 DIAGNOSIS — K64.5 THROMBOSED EXTERNAL HEMORRHOIDS: Primary | ICD-10-CM

## 2020-02-06 PROCEDURE — 99244 OFF/OP CNSLTJ NEW/EST MOD 40: CPT | Mod: S$GLB,,, | Performed by: SURGERY

## 2020-02-06 PROCEDURE — 99999 PR PBB SHADOW E&M-EST. PATIENT-LVL III: ICD-10-PCS | Mod: PBBFAC,,, | Performed by: SURGERY

## 2020-02-06 PROCEDURE — 99999 PR PBB SHADOW E&M-EST. PATIENT-LVL III: CPT | Mod: PBBFAC,,, | Performed by: SURGERY

## 2020-02-06 PROCEDURE — 99244 PR OFFICE CONSULTATION,LEVEL IV: ICD-10-PCS | Mod: S$GLB,,, | Performed by: SURGERY

## 2020-02-06 RX ORDER — OXYBUTYNIN CHLORIDE 10 MG/1
1 TABLET, EXTENDED RELEASE ORAL DAILY
COMMUNITY
End: 2020-05-04

## 2020-02-06 NOTE — PROGRESS NOTES
Subjective:       Patient ID: Tex Pickard III is a 64 y.o. male.    Chief Complaint: Consult (External hemorrhoids)    HPI this is a pleasant 64-year-old gentleman who was referred to me in consultation from Sara king for evaluation of hemorrhoids.  Patient states that he has noted a small lump in his perianal area for nearly the last month.  He notes at 1st it was tender but he is no longer having any pain or discomfort from it. He did have bleeding from it approximately 2 and half weeks ago.  He states he has history of thrombosed hemorrhoid approximately 15 years ago which was drained in the office.  He has had that happen 1 other time.  He presents for possible removal of this hemorrhoid.  Patient has past medical history significant for hypertension.  His past surgical  Review of Systems   Constitutional: Negative for activity change, appetite change, diaphoresis, fever and unexpected weight change.   Respiratory: Negative for cough, chest tightness and wheezing.    Cardiovascular: Negative for chest pain and palpitations.   Gastrointestinal: Negative for abdominal distention, abdominal pain, anal bleeding, blood in stool, constipation, diarrhea, nausea, rectal pain and vomiting.   Genitourinary: Negative for difficulty urinating, dysuria and hematuria.   Musculoskeletal: Negative for back pain and gait problem.   Neurological: Negative for tremors and seizures.       Objective:      Physical Exam   Constitutional: He is oriented to person, place, and time. He appears well-developed and well-nourished.   HENT:   Head: Normocephalic and atraumatic.   Eyes: Pupils are equal, round, and reactive to light.   Neck: Normal range of motion. No tracheal deviation present. No thyromegaly present.   Cardiovascular: Normal rate, regular rhythm and normal heart sounds. Exam reveals no gallop and no friction rub.   No murmur heard.  Pulmonary/Chest: Effort normal and breath sounds normal. He has no wheezes. He exhibits  no tenderness.   Abdominal: He exhibits no distension and no mass. There is no tenderness. There is no rebound and no guarding.   Genitourinary:   Genitourinary Comments: External exam demonstrates ulcerative area in the left lateral position or thrombosed hemorrhoid had released minimal inflammation persists   Musculoskeletal: Normal range of motion.   Neurological: He is alert and oriented to person, place, and time. Coordination normal.   Skin: Skin is warm.   Psychiatric: He has a normal mood and affect.   Vitals reviewed.      Assessment:     thrombosed hemorrhoid  No diagnosis found.    Plan:       Discussion with the patient. This hemorrhoid is already drained on its own spontaneously.  Discussed with him in my opinion no further treatment is needed at the present time.  He will follow up with me p.r.n.

## 2020-02-06 NOTE — LETTER
February 6, 2020      Sara Monahan PA-C  [address] [facility] [address] [place] [address]           [facility]  [address] [place] [address]  Phone: [phone]          Patient: Tex Pickard III   MR Number: [id number]   YOB: 1955   Date of Visit: 2/6/2020       Dear Sara Monahan:    Thank you for referring Tex Pickard to me for evaluation. Attached you will find relevant portions of my assessment and plan of care.    If you have questions, please do not hesitate to call me. I look forward to following Tex Pickard along with you.    Sincerely,    Scott Cam MD    Enclosure  CC:  No Recipients    If you would like to receive this communication electronically, please contact [email or web address] or [phone] to request more information on [employer] Link access.    For providers and/or their staff who would like to refer a patient to [facility], please contact us through our one-stop-shop provider referral line, [facility] , at [phone].    If you feel you have received this communication in error or would no longer like to receive these types of communications, please e-mail [email or web address]

## 2020-02-06 NOTE — Clinical Note
I saw your patient, Tex Pickard III in the office.  Attached are my findings and plan.  Thank you for referring him to my office and if you have any questions please do not hesitate to call my cell [phone].Juanjose Cam

## 2020-03-24 ENCOUNTER — TELEPHONE (OUTPATIENT)
Dept: SURGERY | Facility: CLINIC | Age: 65
End: 2020-03-24

## 2020-05-04 ENCOUNTER — OFFICE VISIT (OUTPATIENT)
Dept: ORTHOPEDICS | Facility: CLINIC | Age: 65
End: 2020-05-04
Payer: COMMERCIAL

## 2020-05-04 VITALS
DIASTOLIC BLOOD PRESSURE: 75 MMHG | WEIGHT: 258.81 LBS | TEMPERATURE: 98 F | SYSTOLIC BLOOD PRESSURE: 128 MMHG | BODY MASS INDEX: 36.23 KG/M2 | HEART RATE: 65 BPM | HEIGHT: 71 IN

## 2020-05-04 DIAGNOSIS — M65.331 TRIGGER FINGER, RIGHT MIDDLE FINGER: Primary | ICD-10-CM

## 2020-05-04 PROCEDURE — 3074F PR MOST RECENT SYSTOLIC BLOOD PRESSURE < 130 MM HG: ICD-10-PCS | Mod: CPTII,S$GLB,, | Performed by: ORTHOPAEDIC SURGERY

## 2020-05-04 PROCEDURE — 3008F BODY MASS INDEX DOCD: CPT | Mod: CPTII,S$GLB,, | Performed by: ORTHOPAEDIC SURGERY

## 2020-05-04 PROCEDURE — 99999 PR PBB SHADOW E&M-EST. PATIENT-LVL III: CPT | Mod: PBBFAC,,, | Performed by: ORTHOPAEDIC SURGERY

## 2020-05-04 PROCEDURE — 3008F PR BODY MASS INDEX (BMI) DOCUMENTED: ICD-10-PCS | Mod: CPTII,S$GLB,, | Performed by: ORTHOPAEDIC SURGERY

## 2020-05-04 PROCEDURE — 99213 OFFICE O/P EST LOW 20 MIN: CPT | Mod: 25,S$GLB,, | Performed by: ORTHOPAEDIC SURGERY

## 2020-05-04 PROCEDURE — 3074F SYST BP LT 130 MM HG: CPT | Mod: CPTII,S$GLB,, | Performed by: ORTHOPAEDIC SURGERY

## 2020-05-04 PROCEDURE — 3078F DIAST BP <80 MM HG: CPT | Mod: CPTII,S$GLB,, | Performed by: ORTHOPAEDIC SURGERY

## 2020-05-04 PROCEDURE — 3078F PR MOST RECENT DIASTOLIC BLOOD PRESSURE < 80 MM HG: ICD-10-PCS | Mod: CPTII,S$GLB,, | Performed by: ORTHOPAEDIC SURGERY

## 2020-05-04 PROCEDURE — 20550 NJX 1 TENDON SHEATH/LIGAMENT: CPT | Mod: F7,S$GLB,, | Performed by: ORTHOPAEDIC SURGERY

## 2020-05-04 PROCEDURE — 99213 PR OFFICE/OUTPT VISIT, EST, LEVL III, 20-29 MIN: ICD-10-PCS | Mod: 25,S$GLB,, | Performed by: ORTHOPAEDIC SURGERY

## 2020-05-04 PROCEDURE — 20550 TENDON SHEATH: ICD-10-PCS | Mod: F7,S$GLB,, | Performed by: ORTHOPAEDIC SURGERY

## 2020-05-04 PROCEDURE — 99999 PR PBB SHADOW E&M-EST. PATIENT-LVL III: ICD-10-PCS | Mod: PBBFAC,,, | Performed by: ORTHOPAEDIC SURGERY

## 2020-05-04 RX ORDER — PRAVASTATIN SODIUM 10 MG/1
10 TABLET ORAL DAILY
COMMUNITY
Start: 2020-02-08

## 2020-05-04 RX ORDER — TRIAMCINOLONE ACETONIDE 40 MG/ML
40 INJECTION, SUSPENSION INTRA-ARTICULAR; INTRAMUSCULAR
Status: DISCONTINUED | OUTPATIENT
Start: 2020-05-04 | End: 2020-05-04 | Stop reason: HOSPADM

## 2020-05-04 RX ORDER — CHOLECALCIFEROL (VITAMIN D3) 125 MCG
10000 CAPSULE ORAL DAILY
COMMUNITY
End: 2021-01-12 | Stop reason: SDUPTHER

## 2020-05-04 RX ADMIN — TRIAMCINOLONE ACETONIDE 40 MG: 40 INJECTION, SUSPENSION INTRA-ARTICULAR; INTRAMUSCULAR at 01:05

## 2020-05-04 NOTE — PROGRESS NOTES
Mr Pickard returns to clinic today.  Has a history of right middle finger triggering.  He underwent a steroid injection for the right middle finger approximately 8 months ago.  He is here today with recurrence of the triggering.  He has no other complaints    Physical exam:  Examination the right hand reveals that there is no edema.  There are no skin changes.  Palpation does produce tenderness over the A1 pulley.  Flexion and extension of the middle finger reveals that he does have active triggering.  He does report intact sensation in the median radial ulnar distribution and capillary refill less than 2 sec in the digits    Assessment:  Right middle finger trigger    Plan:    1.  After informed consent was obtained and injection was placed to the right middle finger flexor tendon sheath.  The patient tolerated that well.    2.  Will follow up with me on a p.r.n. basis

## 2020-05-04 NOTE — PROCEDURES
Tendon Sheath  Date/Time: 5/4/2020 1:40 PM  Performed by: Mauricio Maravilla MD  Authorized by: Mauricio Maravilla MD     Consent Done?:  Yes (Verbal)  Indications:  Pain  Site marked: the procedure site was marked    Timeout: prior to procedure the correct patient, procedure, and site was verified    Prep: patient was prepped and draped in usual sterile fashion      Local anesthetic:  Lidocaine 1% without epinephrine  Anesthetic total (ml):  0.5    Location:  Long finger  Long finger joint: Right middle finger flexor tendon sheath.  Needle size:  25 G  Medications:  40 mg triamcinolone acetonide 40 mg/mL (20mg injected)  Patient tolerance:  Patient tolerated the procedure well with no immediate complications

## 2020-05-05 ENCOUNTER — PATIENT MESSAGE (OUTPATIENT)
Dept: ADMINISTRATIVE | Facility: HOSPITAL | Age: 65
End: 2020-05-05

## 2020-10-17 ENCOUNTER — PATIENT OUTREACH (OUTPATIENT)
Dept: ADMINISTRATIVE | Facility: OTHER | Age: 65
End: 2020-10-17

## 2020-10-17 NOTE — PROGRESS NOTES
LINKS immunization registry updated  Care Everywhere updated  Health Maintenance updated  Chart reviewed for overdue Proactive [facility] Encounters ([facility]) health maintenance testing (CRS, Breast Ca, Diabetic Eye Exam)   Orders entered:N/A

## 2020-10-19 ENCOUNTER — OFFICE VISIT (OUTPATIENT)
Dept: ORTHOPEDICS | Facility: CLINIC | Age: 65
End: 2020-10-19
Payer: COMMERCIAL

## 2020-10-19 VITALS
BODY MASS INDEX: 36.23 KG/M2 | WEIGHT: 258.81 LBS | HEART RATE: 64 BPM | SYSTOLIC BLOOD PRESSURE: 118 MMHG | DIASTOLIC BLOOD PRESSURE: 78 MMHG | HEIGHT: 71 IN

## 2020-10-19 DIAGNOSIS — Z01.818 PREOP TESTING: ICD-10-CM

## 2020-10-19 DIAGNOSIS — M65.331 TRIGGER FINGER, RIGHT MIDDLE FINGER: Primary | ICD-10-CM

## 2020-10-19 PROCEDURE — 99213 OFFICE O/P EST LOW 20 MIN: CPT | Mod: S$GLB,,, | Performed by: ORTHOPAEDIC SURGERY

## 2020-10-19 PROCEDURE — 3078F DIAST BP <80 MM HG: CPT | Mod: CPTII,S$GLB,, | Performed by: ORTHOPAEDIC SURGERY

## 2020-10-19 PROCEDURE — 3078F PR MOST RECENT DIASTOLIC BLOOD PRESSURE < 80 MM HG: ICD-10-PCS | Mod: CPTII,S$GLB,, | Performed by: ORTHOPAEDIC SURGERY

## 2020-10-19 PROCEDURE — 99999 PR PBB SHADOW E&M-EST. PATIENT-LVL V: CPT | Mod: PBBFAC,,, | Performed by: ORTHOPAEDIC SURGERY

## 2020-10-19 PROCEDURE — 3074F SYST BP LT 130 MM HG: CPT | Mod: CPTII,S$GLB,, | Performed by: ORTHOPAEDIC SURGERY

## 2020-10-19 PROCEDURE — 99999 PR PBB SHADOW E&M-EST. PATIENT-LVL V: ICD-10-PCS | Mod: PBBFAC,,, | Performed by: ORTHOPAEDIC SURGERY

## 2020-10-19 PROCEDURE — 99213 PR OFFICE/OUTPT VISIT, EST, LEVL III, 20-29 MIN: ICD-10-PCS | Mod: S$GLB,,, | Performed by: ORTHOPAEDIC SURGERY

## 2020-10-19 PROCEDURE — 3008F PR BODY MASS INDEX (BMI) DOCUMENTED: ICD-10-PCS | Mod: CPTII,S$GLB,, | Performed by: ORTHOPAEDIC SURGERY

## 2020-10-19 PROCEDURE — 3074F PR MOST RECENT SYSTOLIC BLOOD PRESSURE < 130 MM HG: ICD-10-PCS | Mod: CPTII,S$GLB,, | Performed by: ORTHOPAEDIC SURGERY

## 2020-10-19 PROCEDURE — 3008F BODY MASS INDEX DOCD: CPT | Mod: CPTII,S$GLB,, | Performed by: ORTHOPAEDIC SURGERY

## 2020-10-19 NOTE — PATIENT INSTRUCTIONS
Surgery Instructions:     Your surgery is scheduled on 11/12/2020 at the surgery center: [address] [facility], 1st floor, second entrance.    The pre-op department will be in contact with you prior to your procedure to review medications and instructions.       Nothing to eat or drink after midnight prior to day of surgery.    You should STOP taking any blood thinners 7 days prior to surgery.       Your pre op COVID-19 test collection is scheduled for 11/09/2020 at 10:10 AM.  Please arrive at the drive thru at entrance 3 for this test collection.  You will not need to get out of your car.    The surgery center will contact you the day prior to surgery to advise you of your arrival time for surgery.     Your post op appointment is scheduled on 11/25/2020 at 11:20 AM.    You will be contacted by an automated text message every morning for for 14 days after your surgery inquiring if you have any COVID symptoms.  If you have any concerns regarding COVID please reply to the text message and then an [facility] On Call Registered Nurse will contact you later that day.

## 2020-10-19 NOTE — PROGRESS NOTES
"10/19/2020    Chief Complaint:  Chief Complaint   Patient presents with    Right Hand - Pain       HPI:  Tex Pickard III is a 65 y.o. male, who presents to clinic today has a history of right middle finger triggering.  He has been injected on 2 previous occasions.  He states that the injections did work well but he continues to have recurrence.  He is here today for further treatment options and evaluation.    PMHX:  Past Medical History:   Diagnosis Date    Arthritis     Colon polyps     History of colon polyps 4/14/2014    Hypertension     Sleep apnea        PSHX:  Past Surgical History:   Procedure Laterality Date    CATARACT EXTRACTION W/  INTRAOCULAR LENS IMPLANT      curt    COLONOSCOPY N/A 10/7/2019    Procedure: COLONOSCOPY;  Surgeon: To Martinez Jr., MD;  Location: [facility];  Service: Endoscopy;  Laterality: N/A;    COLONOSCOPY W/ POLYPECTOMY  9/26/2008  Juan    One 1 to 2 mm polyp in the hepatic flexure.  TUBULAR ADENOMA.   Redundant colon.   Otherwise, the colon and term ileum is normal.     EYE SURGERY      cataract OU    EYE SURGERY Bilateral     eye lid    GASTRIC BYPASS      LIPOSUCTION  2011    plastic surgery - abdominal dermolipectomy ("tummy tuck")    VASECTOMY         FMHX:  Family History   Problem Relation Age of Onset    Diabetes Father     Hypertension Mother     Dementia Mother         early 70's       SOCHX:  Social History     Tobacco Use    Smoking status: Former Smoker     Types: Cigarettes    Smokeless tobacco: Former User   Substance Use Topics    Alcohol use: No     Frequency: Never     Drinks per session: Patient refused     Binge frequency: Never       ALLERGIES:  Cefaclor and Codeine    CURRENT MEDICATIONS:  Current Outpatient Medications on File Prior to Visit   Medication Sig Dispense Refill    aspirin 81 MG Chew Take 81 mg by mouth once daily.      BYSTOLIC 20 mg Tab TK 1 T PO ONCE A DAY.      cholecalciferol, vitamin D3, 125 mcg (5,000 unit) " "capsule Take 10,000 Units by mouth once daily.      finasteride (PROPECIA) 1 mg tablet Take 1 mg by mouth once daily.  3    losartan (COZAAR) 50 MG tablet TK 1 T PO ONCE A DAY      multivitamin capsule Take 1 capsule by mouth once daily.      oxymetazoline (AFRIN) 0.05 % nasal spray 2 sprays by Nasal route every evening.      pravastatin (PRAVACHOL) 10 MG tablet TK 1 T PO QD      hydrocortisone 2.5 % cream Apply topically 2 (two) times daily. for 10 days 1 Tube 1    testosterone cypionate (DEPOTESTOTERONE CYPIONATE) 200 mg/mL injection Inject 0.5 mLs (100 mg total) into the muscle every 14 (fourteen) days. 10 mL 5    UNABLE TO FIND HCG (LYO) 6,000 IU INJECTABLE EMP: MIX WITH 6ML OF BACTERIOSTATIC WATER.  INJECT 0.5ML OR 50 UNITS ON AN INSULIN SYRINGE (500 IU OF HCG) SUBCUTANEOUSLY TWICE A WEEK       No current facility-administered medications on file prior to visit.        REVIEW OF SYSTEMS:  ROS    GENERAL PHYSICAL EXAM:   /78   Pulse 64   Ht 5' 11" (1.803 m)   Wt 117.4 kg (258 lb 13.1 oz)   BMI 36.10 kg/m²    GEN: well developed, well nourished, no acute distress   HENT: Normocephalic, atraumatic   EYES: No discharge, conjunctiva normal   NECK: Supple, non-tender   PULM: No wheezing, no respiratory distress   CV: RRR   ABD: Soft, non-tender    ORTHO EXAM:   Examination the right hand reveals that there is no edema.  There are no skin changes.  Palpation produces tenderness directly over the A1 pulley.  He has no other areas of tenderness.  Flexion and extension of the middle finger reveals that there is active triggering noted.  He does report intact sensation in the median radial ulnar distribution.  Capillary refill is less than 2 sec in all the digits.    RADIOLOGY:   None    ASSESSMENT:   Right middle finger triggering    PLAN:  1.  After a discussion of the risks and benefits of the surgical procedure informed consent has been obtained.    2.  Will proceed with right middle finger A1 pulley " release under local anesthesia    3.  Will follow up with me 2 weeks postoperatively

## 2020-10-19 NOTE — H&P (VIEW-ONLY)
"10/19/2020    Chief Complaint:  Chief Complaint   Patient presents with    Right Hand - Pain       HPI:  Tex Pickard III is a 65 y.o. male, who presents to clinic today has a history of right middle finger triggering.  He has been injected on 2 previous occasions.  He states that the injections did work well but he continues to have recurrence.  He is here today for further treatment options and evaluation.    PMHX:  Past Medical History:   Diagnosis Date    Arthritis     Colon polyps     History of colon polyps 4/14/2014    Hypertension     Sleep apnea        PSHX:  Past Surgical History:   Procedure Laterality Date    CATARACT EXTRACTION W/  INTRAOCULAR LENS IMPLANT      curt    COLONOSCOPY N/A 10/7/2019    Procedure: COLONOSCOPY;  Surgeon: To Martinez Jr., MD;  Location: [facility];  Service: Endoscopy;  Laterality: N/A;    COLONOSCOPY W/ POLYPECTOMY  9/26/2008  Juna    One 1 to 2 mm polyp in the hepatic flexure.  TUBULAR ADENOMA.   Redundant colon.   Otherwise, the colon and term ileum is normal.     EYE SURGERY      cataract OU    EYE SURGERY Bilateral     eye lid    GASTRIC BYPASS      LIPOSUCTION  2011    plastic surgery - abdominal dermolipectomy ("tummy tuck")    VASECTOMY         FMHX:  Family History   Problem Relation Age of Onset    Diabetes Father     Hypertension Mother     Dementia Mother         early 70's       SOCHX:  Social History     Tobacco Use    Smoking status: Former Smoker     Types: Cigarettes    Smokeless tobacco: Former User   Substance Use Topics    Alcohol use: No     Frequency: Never     Drinks per session: Patient refused     Binge frequency: Never       ALLERGIES:  Cefaclor and Codeine    CURRENT MEDICATIONS:  Current Outpatient Medications on File Prior to Visit   Medication Sig Dispense Refill    aspirin 81 MG Chew Take 81 mg by mouth once daily.      BYSTOLIC 20 mg Tab TK 1 T PO ONCE A DAY.      cholecalciferol, vitamin D3, 125 mcg (5,000 unit) " "capsule Take 10,000 Units by mouth once daily.      finasteride (PROPECIA) 1 mg tablet Take 1 mg by mouth once daily.  3    losartan (COZAAR) 50 MG tablet TK 1 T PO ONCE A DAY      multivitamin capsule Take 1 capsule by mouth once daily.      oxymetazoline (AFRIN) 0.05 % nasal spray 2 sprays by Nasal route every evening.      pravastatin (PRAVACHOL) 10 MG tablet TK 1 T PO QD      hydrocortisone 2.5 % cream Apply topically 2 (two) times daily. for 10 days 1 Tube 1    testosterone cypionate (DEPOTESTOTERONE CYPIONATE) 200 mg/mL injection Inject 0.5 mLs (100 mg total) into the muscle every 14 (fourteen) days. 10 mL 5    UNABLE TO FIND HCG (LYO) 6,000 IU INJECTABLE EMP: MIX WITH 6ML OF BACTERIOSTATIC WATER.  INJECT 0.5ML OR 50 UNITS ON AN INSULIN SYRINGE (500 IU OF HCG) SUBCUTANEOUSLY TWICE A WEEK       No current facility-administered medications on file prior to visit.        REVIEW OF SYSTEMS:  ROS    GENERAL PHYSICAL EXAM:   /78   Pulse 64   Ht 5' 11" (1.803 m)   Wt 117.4 kg (258 lb 13.1 oz)   BMI 36.10 kg/m²    GEN: well developed, well nourished, no acute distress   HENT: Normocephalic, atraumatic   EYES: No discharge, conjunctiva normal   NECK: Supple, non-tender   PULM: No wheezing, no respiratory distress   CV: RRR   ABD: Soft, non-tender    ORTHO EXAM:   Examination the right hand reveals that there is no edema.  There are no skin changes.  Palpation produces tenderness directly over the A1 pulley.  He has no other areas of tenderness.  Flexion and extension of the middle finger reveals that there is active triggering noted.  He does report intact sensation in the median radial ulnar distribution.  Capillary refill is less than 2 sec in all the digits.    RADIOLOGY:   None    ASSESSMENT:   Right middle finger triggering    PLAN:  1.  After a discussion of the risks and benefits of the surgical procedure informed consent has been obtained.    2.  Will proceed with right middle finger A1 pulley " release under local anesthesia    3.  Will follow up with me 2 weeks postoperatively

## 2020-10-20 RX ORDER — LIDOCAINE HYDROCHLORIDE 10 MG/ML
1 INJECTION, SOLUTION EPIDURAL; INFILTRATION; INTRACAUDAL; PERINEURAL ONCE
Status: CANCELLED | OUTPATIENT
Start: 2020-10-20 | End: 2020-10-20

## 2020-11-09 ENCOUNTER — LAB VISIT (OUTPATIENT)
Dept: FAMILY MEDICINE | Facility: CLINIC | Age: 65
End: 2020-11-09
Payer: COMMERCIAL

## 2020-11-09 DIAGNOSIS — Z01.818 PREOP TESTING: ICD-10-CM

## 2020-11-09 PROCEDURE — U0003 INFECTIOUS AGENT DETECTION BY NUCLEIC ACID (DNA OR RNA); SEVERE ACUTE RESPIRATORY SYNDROME CORONAVIRUS 2 (SARS-COV-2) (CORONAVIRUS DISEASE [COVID-19]), AMPLIFIED PROBE TECHNIQUE, MAKING USE OF HIGH THROUGHPUT TECHNOLOGIES AS DESCRIBED BY CMS-2020-01-R: HCPCS

## 2020-11-10 LAB — SARS-COV-2 RNA RESP QL NAA+PROBE: NOT DETECTED

## 2020-11-12 ENCOUNTER — HOSPITAL ENCOUNTER (OUTPATIENT)
Facility: HOSPITAL | Age: 65
Discharge: HOME | End: 2020-11-12
Attending: ORTHOPAEDIC SURGERY | Admitting: ORTHOPAEDIC SURGERY
Payer: COMMERCIAL

## 2020-11-12 DIAGNOSIS — M65.331 TRIGGER FINGER, RIGHT MIDDLE FINGER: ICD-10-CM

## 2020-11-12 PROCEDURE — 26055 PR INCISE FINGER TENDON SHEATH: ICD-10-PCS | Mod: F7,,, | Performed by: ORTHOPAEDIC SURGERY

## 2020-11-12 PROCEDURE — 36000706: Mod: PO | Performed by: ORTHOPAEDIC SURGERY

## 2020-11-12 PROCEDURE — 25000003 PHARM REV CODE 250: Mod: PO | Performed by: ORTHOPAEDIC SURGERY

## 2020-11-12 PROCEDURE — 26055 INCISE FINGER TENDON SHEATH: CPT | Mod: F7,,, | Performed by: ORTHOPAEDIC SURGERY

## 2020-11-12 PROCEDURE — 36000707: Mod: PO | Performed by: ORTHOPAEDIC SURGERY

## 2020-11-12 PROCEDURE — 71000015 HC POSTOP RECOV 1ST HR: Mod: PO | Performed by: ORTHOPAEDIC SURGERY

## 2020-11-12 RX ORDER — CLINDAMYCIN PHOSPHATE 600 MG/50ML
600 INJECTION, SOLUTION INTRAVENOUS
Status: COMPLETED | OUTPATIENT
Start: 2020-11-12 | End: 2020-11-12

## 2020-11-12 RX ORDER — SODIUM CHLORIDE, SODIUM LACTATE, POTASSIUM CHLORIDE, CALCIUM CHLORIDE 600; 310; 30; 20 MG/100ML; MG/100ML; MG/100ML; MG/100ML
INJECTION, SOLUTION INTRAVENOUS CONTINUOUS
Status: DISCONTINUED | OUTPATIENT
Start: 2020-11-12 | End: 2020-11-12 | Stop reason: HOSPADM

## 2020-11-12 RX ORDER — BUPIVACAINE HYDROCHLORIDE 2.5 MG/ML
INJECTION, SOLUTION EPIDURAL; INFILTRATION; INTRACAUDAL
Status: DISCONTINUED | OUTPATIENT
Start: 2020-11-12 | End: 2020-11-12 | Stop reason: HOSPADM

## 2020-11-12 RX ORDER — LIDOCAINE HYDROCHLORIDE 10 MG/ML
1 INJECTION, SOLUTION EPIDURAL; INFILTRATION; INTRACAUDAL; PERINEURAL ONCE
Status: DISCONTINUED | OUTPATIENT
Start: 2020-11-12 | End: 2020-11-12 | Stop reason: HOSPADM

## 2020-11-12 RX ORDER — SODIUM CHLORIDE 9 MG/ML
INJECTION, SOLUTION INTRAVENOUS ONCE
Status: COMPLETED | OUTPATIENT
Start: 2020-11-12 | End: 2020-11-12

## 2020-11-12 RX ORDER — LIDOCAINE HYDROCHLORIDE 10 MG/ML
INJECTION, SOLUTION EPIDURAL; INFILTRATION; INTRACAUDAL; PERINEURAL
Status: DISCONTINUED | OUTPATIENT
Start: 2020-11-12 | End: 2020-11-12 | Stop reason: HOSPADM

## 2020-11-12 RX ORDER — IBUPROFEN 600 MG/1
600 TABLET ORAL EVERY 6 HOURS PRN
Qty: 6 TABLET | Refills: 0 | Status: SHIPPED | OUTPATIENT
Start: 2020-11-12 | End: 2021-01-12

## 2020-11-12 RX ADMIN — SODIUM CHLORIDE: 0.9 INJECTION, SOLUTION INTRAVENOUS at 07:11

## 2020-11-12 RX ADMIN — CLINDAMYCIN IN 5 PERCENT DEXTROSE 600 MG: 12 INJECTION, SOLUTION INTRAVENOUS at 07:11

## 2020-11-12 NOTE — INTERVAL H&P NOTE
The patient has been examined and the H&P has been reviewed:    I concur with the findings and no changes have occurred since H&P was written.    Surgery risks, benefits and alternative options discussed and understood by patient/family.          Active Hospital Problems    Diagnosis  POA    Trigger finger, right middle finger [M65.331]  Yes      Resolved Hospital Problems   No resolved problems to display.

## 2020-11-12 NOTE — DISCHARGE INSTRUCTIONS
Procedure:  Trigger Finger Release    1.  Please keep the dressing clean and dry.  Do not take it off and do not get it wet.    2.  Please keep the surgical arm and hand elevated for the 1st 24-48 hours to prevent swelling    3.  The dressing to the surgical hand can be taken off after 7 days.  You can begin washing your hand under running water after the dressing is removed.  Please cover the wounds with a Band-Aid to prevent irritation of the wounds after the operative dressing has been removed.    4.  Please limit weight-bearing to the surgical hand.  Do not lift more than 2-3 lb and do not push off with the surgical arm or hand    5.  Flexion and extension of the fingers is encouraged to prevent stiffness    6.  Ibuprofen has been prescribed for pain.  Please take it as necessary    7.  If there are any questions or concerns please call Dr. Maravilla office    8.  Follow up with Dr. Maravilla in 2 weeks

## 2020-11-12 NOTE — DISCHARGE SUMMARY
[facility]  Discharge Note  Short Stay    Procedure(s) (LRB):  RELEASE, TRIGGER FINGER (Right)    OUTCOME: Patient tolerated treatment/procedure well without complication and is now ready for discharge.    DISPOSITION: Home or Self Care    FINAL DIAGNOSIS:  Trigger finger, right middle finger    FOLLOWUP: In clinic    DISCHARGE INSTRUCTIONS:    Discharge Procedure Orders   Diet general     Activity as tolerated     Keep surgical extremity elevated     Lifting restrictions     Call MD for:  temperature >100.4     Call MD for:  persistent nausea and vomiting     Call MD for:  severe uncontrolled pain     Call MD for:  difficulty breathing, headache or visual disturbances     Call MD for:  redness, tenderness, or signs of infection (pain, swelling, redness, odor or green/yellow discharge around incision site)     Call MD for:  hives     Call MD for:  persistent dizziness or light-headedness     Call MD for:  extreme fatigue     Leave dressing on - Keep it clean, dry, and intact until clinic visit

## 2020-11-12 NOTE — OP NOTE
Tex Pickard III  1955    DATE OF SURGERY: 11/12/2020     PRE-OPERATIVE DIAGNOSIS:  Right middle trigger finger    POST-OPERATIVE DIAGNOSIS:  Right middle trigger finger     ANESTHESIA TYPE:  Local    BLOOD LOSS:  Less than 10 cc    TOURNIQUET TIME:  None    SURGEON: Dr. Maravilla    ASSISTANT: Traci Mejia    PROCEDURE:  Right middle finger A1 Pulley Release    IMPLANTS: None    SPECIMENS: None    INDICATION:     Mr. Pickard presented to my clinic with a history of right middle finger triggering. Conservative treatments were initially tried including steroid injections. The patient did have relief with attempts at conservative treatment however the triggering has reccured. A discussion of the risks and benefits of A1 Pulley release has been performed, and informed consent for the procedure has been obtained.    PROCEDURE IN DETAIL:     Mr. Pickard was transported to the operating room and was placed supine on the operating room table. All appropriate points were padded. The right hand and arm was prepped and draped in the normal sterile fashion. Time out was called. The correct patient, correct operative site, correct procedure, antibiotic administration which consisted of 600 mg of Cleocin, and allergies to medications which are to Cefaclor and Codeine  were reviewed. Time in was then called.     Attention was turned to right hand where a 2 cm incision was made in the palm of the hand directly over the A1 pulley of the right middle finger. This was carried through the skin. Subcutaneous tissues were dissected with tenotomy scissors. The flexor tendon and flexor tendon sheath was identified. The flexor tendon sheath was incised for a distance of approximately 1 cm sharply. At that point the A1 pulley was identified. Tenotomy scissors were then used to release the flexor tendon sheath and A1 pulley. The finger was taken through a range of motion and no further triggering was noted. The tendon was  retracted through the wound. The tendon was noted to have an area of enlargement with some inflammatory changes. There was no other pathology noted. The wound was then irrigated. All visible vessels were cauterized with bipolar cautery. The wound was copiously irrigated. The wound was then closed with 4-0 nylon superficially. Local anesthetic was provided using 0.25% Marcaine without epinephrine. The wound was dressed with Xeroform, gauze, cast padding and an Ace wrap.        The patient  was transported to the recovery room in stable condition. All lap, needle, sponge, and equipment counts were correct at the end of the case.    POST-OPERATIVE PLAN:     Mr. Pickard will keep a soft dressing in place for two weeks at which time He will followup with me. The dressing will be taken down, and the sutures will be removed at that time. He is not to get the dressing wet or to take it off. He will have a 2 pound weight limit of the left upper extremity for a total of 4 weeks.

## 2020-11-13 VITALS
TEMPERATURE: 98 F | DIASTOLIC BLOOD PRESSURE: 80 MMHG | WEIGHT: 250 LBS | OXYGEN SATURATION: 100 % | HEIGHT: 71 IN | SYSTOLIC BLOOD PRESSURE: 146 MMHG | RESPIRATION RATE: 18 BRPM | HEART RATE: 50 BPM | BODY MASS INDEX: 35 KG/M2

## 2020-11-30 ENCOUNTER — OFFICE VISIT (OUTPATIENT)
Dept: ORTHOPEDICS | Facility: CLINIC | Age: 65
End: 2020-11-30
Payer: COMMERCIAL

## 2020-11-30 VITALS
HEART RATE: 66 BPM | HEIGHT: 71 IN | BODY MASS INDEX: 35 KG/M2 | WEIGHT: 250 LBS | DIASTOLIC BLOOD PRESSURE: 74 MMHG | SYSTOLIC BLOOD PRESSURE: 134 MMHG

## 2020-11-30 DIAGNOSIS — M65.331 TRIGGER FINGER, RIGHT MIDDLE FINGER: Primary | ICD-10-CM

## 2020-11-30 PROCEDURE — 99999 PR PBB SHADOW E&M-EST. PATIENT-LVL III: CPT | Mod: PBBFAC,,, | Performed by: ORTHOPAEDIC SURGERY

## 2020-11-30 PROCEDURE — 1126F PR PAIN SEVERITY QUANTIFIED, NO PAIN PRESENT: ICD-10-PCS | Mod: S$GLB,,, | Performed by: ORTHOPAEDIC SURGERY

## 2020-11-30 PROCEDURE — 3008F BODY MASS INDEX DOCD: CPT | Mod: CPTII,S$GLB,, | Performed by: ORTHOPAEDIC SURGERY

## 2020-11-30 PROCEDURE — 99024 POSTOP FOLLOW-UP VISIT: CPT | Mod: S$GLB,,, | Performed by: ORTHOPAEDIC SURGERY

## 2020-11-30 PROCEDURE — 99024 PR POST-OP FOLLOW-UP VISIT: ICD-10-PCS | Mod: S$GLB,,, | Performed by: ORTHOPAEDIC SURGERY

## 2020-11-30 PROCEDURE — 1126F AMNT PAIN NOTED NONE PRSNT: CPT | Mod: S$GLB,,, | Performed by: ORTHOPAEDIC SURGERY

## 2020-11-30 PROCEDURE — 3008F PR BODY MASS INDEX (BMI) DOCUMENTED: ICD-10-PCS | Mod: CPTII,S$GLB,, | Performed by: ORTHOPAEDIC SURGERY

## 2020-11-30 PROCEDURE — 99999 PR PBB SHADOW E&M-EST. PATIENT-LVL III: ICD-10-PCS | Mod: PBBFAC,,, | Performed by: ORTHOPAEDIC SURGERY

## 2020-11-30 NOTE — PROGRESS NOTES
Mr Pickard returns to clinic today.  He is 2 weeks status post right middle finger trigger release.  He is overall doing well.  There are no major complaints.  He does have a small amount of stiffness noted.      Physical exam:  Examination of the left hand reveals that the incision is healing well.  There are sutures in place.  There is no edema or erythema.  He is neurovascularly intact distally.  He is able make a full composite fist and fully extend the finger without triggering.    Assessment:  Status post left middle finger trigger release    Plan:    1.  Sutures were removed today and Steri-Strips are placed    2.  Will be limited weight-bearing to the left arm and hand    3.  Will follow up with me in 2-3 weeks for repeat evaluation

## 2020-12-21 ENCOUNTER — OFFICE VISIT (OUTPATIENT)
Dept: ORTHOPEDICS | Facility: CLINIC | Age: 65
End: 2020-12-21
Payer: COMMERCIAL

## 2020-12-21 VITALS
HEIGHT: 71 IN | WEIGHT: 250 LBS | SYSTOLIC BLOOD PRESSURE: 120 MMHG | HEART RATE: 62 BPM | DIASTOLIC BLOOD PRESSURE: 79 MMHG | BODY MASS INDEX: 35 KG/M2

## 2020-12-21 DIAGNOSIS — M65.331 TRIGGER FINGER, RIGHT MIDDLE FINGER: Primary | ICD-10-CM

## 2020-12-21 PROCEDURE — 3008F BODY MASS INDEX DOCD: CPT | Mod: CPTII,S$GLB,, | Performed by: ORTHOPAEDIC SURGERY

## 2020-12-21 PROCEDURE — 99999 PR PBB SHADOW E&M-EST. PATIENT-LVL III: CPT | Mod: PBBFAC,,, | Performed by: ORTHOPAEDIC SURGERY

## 2020-12-21 PROCEDURE — 3008F PR BODY MASS INDEX (BMI) DOCUMENTED: ICD-10-PCS | Mod: CPTII,S$GLB,, | Performed by: ORTHOPAEDIC SURGERY

## 2020-12-21 PROCEDURE — 99024 PR POST-OP FOLLOW-UP VISIT: ICD-10-PCS | Mod: S$GLB,,, | Performed by: ORTHOPAEDIC SURGERY

## 2020-12-21 PROCEDURE — 99999 PR PBB SHADOW E&M-EST. PATIENT-LVL III: ICD-10-PCS | Mod: PBBFAC,,, | Performed by: ORTHOPAEDIC SURGERY

## 2020-12-21 PROCEDURE — 1125F AMNT PAIN NOTED PAIN PRSNT: CPT | Mod: S$GLB,,, | Performed by: ORTHOPAEDIC SURGERY

## 2020-12-21 PROCEDURE — 99024 POSTOP FOLLOW-UP VISIT: CPT | Mod: S$GLB,,, | Performed by: ORTHOPAEDIC SURGERY

## 2020-12-21 PROCEDURE — 1125F PR PAIN SEVERITY QUANTIFIED, PAIN PRESENT: ICD-10-PCS | Mod: S$GLB,,, | Performed by: ORTHOPAEDIC SURGERY

## 2020-12-21 NOTE — PROGRESS NOTES
Mr Pickard returns to clinic today.  Has a history of right middle finger trigger release.  He is now doing very well.  He has full range of motion without recurrence triggering.    Physical exam:  Examination the right hand reveals the incision is well healed.  There is no edema.  Palpation produces no specific tenderness.  He is able make a full composite fist and fully extend the fingers.  Sensation is intact.    Assessment:  Status post right middle finger trigger release    Plan:    1.  He is allowed to return to activity as tolerated    2.  Will follow up with me on a p.r.n. basis

## 2021-03-05 ENCOUNTER — LAB VISIT (OUTPATIENT)
Dept: LAB | Facility: HOSPITAL | Age: 66
End: 2021-03-05
Attending: INTERNAL MEDICINE
Payer: COMMERCIAL

## 2021-03-05 DIAGNOSIS — D63.1 ANEMIA OF CHRONIC RENAL FAILURE, STAGE 3A: ICD-10-CM

## 2021-03-05 DIAGNOSIS — N18.31 STAGE 3A CHRONIC KIDNEY DISEASE: ICD-10-CM

## 2021-03-05 DIAGNOSIS — N18.31 ANEMIA OF CHRONIC RENAL FAILURE, STAGE 3A: ICD-10-CM

## 2021-03-05 LAB
ALBUMIN SERPL BCP-MCNC: 4.5 G/DL (ref 3.5–5.2)
ANION GAP SERPL CALC-SCNC: 10 MMOL/L (ref 8–16)
BASOPHILS # BLD AUTO: 0.03 K/UL (ref 0–0.2)
BASOPHILS NFR BLD: 0.7 % (ref 0–1.9)
BUN SERPL-MCNC: 18 MG/DL (ref 8–23)
CALCIUM SERPL-MCNC: 9.5 MG/DL (ref 8.7–10.5)
CHLORIDE SERPL-SCNC: 106 MMOL/L (ref 95–110)
CO2 SERPL-SCNC: 24 MMOL/L (ref 23–29)
CREAT SERPL-MCNC: 1 MG/DL (ref 0.5–1.4)
DIFFERENTIAL METHOD: NORMAL
EOSINOPHIL # BLD AUTO: 0.1 K/UL (ref 0–0.5)
EOSINOPHIL NFR BLD: 3.1 % (ref 0–8)
ERYTHROCYTE [DISTWIDTH] IN BLOOD BY AUTOMATED COUNT: 13.2 % (ref 11.5–14.5)
EST. GFR  (AFRICAN AMERICAN): >60 ML/MIN/1.73 M^2
EST. GFR  (NON AFRICAN AMERICAN): >60 ML/MIN/1.73 M^2
GLUCOSE SERPL-MCNC: 89 MG/DL (ref 70–110)
HCT VFR BLD AUTO: 44 % (ref 40–54)
HGB BLD-MCNC: 14.3 G/DL (ref 14–18)
IMM GRANULOCYTES # BLD AUTO: 0.01 K/UL (ref 0–0.04)
IMM GRANULOCYTES NFR BLD AUTO: 0.2 % (ref 0–0.5)
IRON SERPL-MCNC: 62 UG/DL (ref 45–160)
LYMPHOCYTES # BLD AUTO: 1.4 K/UL (ref 1–4.8)
LYMPHOCYTES NFR BLD: 31.8 % (ref 18–48)
MAGNESIUM SERPL-MCNC: 2.1 MG/DL (ref 1.6–2.6)
MCH RBC QN AUTO: 30.2 PG (ref 27–31)
MCHC RBC AUTO-ENTMCNC: 32.5 G/DL (ref 32–36)
MCV RBC AUTO: 93 FL (ref 82–98)
MONOCYTES # BLD AUTO: 0.3 K/UL (ref 0.3–1)
MONOCYTES NFR BLD: 7.6 % (ref 4–15)
NEUTROPHILS # BLD AUTO: 2.6 K/UL (ref 1.8–7.7)
NEUTROPHILS NFR BLD: 56.6 % (ref 38–73)
NRBC BLD-RTO: 0 /100 WBC
PHOSPHATE SERPL-MCNC: 2.5 MG/DL (ref 2.7–4.5)
PLATELET # BLD AUTO: 191 K/UL (ref 150–350)
PMV BLD AUTO: 11.8 FL (ref 9.2–12.9)
POTASSIUM SERPL-SCNC: 4.2 MMOL/L (ref 3.5–5.1)
RBC # BLD AUTO: 4.73 M/UL (ref 4.6–6.2)
SATURATED IRON: 13 % (ref 20–50)
SODIUM SERPL-SCNC: 140 MMOL/L (ref 136–145)
TOTAL IRON BINDING CAPACITY: 490 UG/DL (ref 250–450)
TRANSFERRIN SERPL-MCNC: 331 MG/DL (ref 200–375)
WBC # BLD AUTO: 4.5 K/UL (ref 3.9–12.7)

## 2021-03-05 PROCEDURE — 80069 RENAL FUNCTION PANEL: CPT | Performed by: INTERNAL MEDICINE

## 2021-03-05 PROCEDURE — 83540 ASSAY OF IRON: CPT | Performed by: INTERNAL MEDICINE

## 2021-03-05 PROCEDURE — 36415 COLL VENOUS BLD VENIPUNCTURE: CPT | Mod: PO | Performed by: INTERNAL MEDICINE

## 2021-03-05 PROCEDURE — 83970 ASSAY OF PARATHORMONE: CPT | Performed by: INTERNAL MEDICINE

## 2021-03-05 PROCEDURE — 85025 COMPLETE CBC W/AUTO DIFF WBC: CPT | Performed by: INTERNAL MEDICINE

## 2021-03-05 PROCEDURE — 83735 ASSAY OF MAGNESIUM: CPT | Performed by: INTERNAL MEDICINE

## 2021-03-05 PROCEDURE — 82607 VITAMIN B-12: CPT | Performed by: INTERNAL MEDICINE

## 2021-03-05 PROCEDURE — 82728 ASSAY OF FERRITIN: CPT | Performed by: INTERNAL MEDICINE

## 2021-03-06 LAB
FERRITIN SERPL-MCNC: 20 NG/ML (ref 20–300)
PTH-INTACT SERPL-MCNC: 65 PG/ML (ref 9–77)
VIT B12 SERPL-MCNC: >2000 PG/ML (ref 210–950)

## 2021-04-06 ENCOUNTER — PATIENT MESSAGE (OUTPATIENT)
Dept: ADMINISTRATIVE | Facility: HOSPITAL | Age: 66
End: 2021-04-06

## 2021-06-29 ENCOUNTER — LAB VISIT (OUTPATIENT)
Dept: LAB | Facility: HOSPITAL | Age: 66
End: 2021-06-29
Attending: INTERNAL MEDICINE
Payer: COMMERCIAL

## 2021-06-29 DIAGNOSIS — D50.9 IRON DEFICIENCY ANEMIA, UNSPECIFIED: ICD-10-CM

## 2021-06-29 DIAGNOSIS — I35.1 AORTIC INCOMPETENCE: ICD-10-CM

## 2021-06-29 DIAGNOSIS — I25.10 CORONARY ATHEROSCLEROSIS OF NATIVE CORONARY ARTERY: Primary | ICD-10-CM

## 2021-06-29 DIAGNOSIS — R94.31 NONSPECIFIC ABNORMAL ELECTROCARDIOGRAM (ECG) (EKG): ICD-10-CM

## 2021-06-29 DIAGNOSIS — E78.2 MIXED HYPERLIPIDEMIA: ICD-10-CM

## 2021-06-29 DIAGNOSIS — R00.1 SEVERE SINUS BRADYCARDIA: ICD-10-CM

## 2021-06-29 LAB
APTT BLDCRRT: 24.4 SEC (ref 21–32)
CEA SERPL-MCNC: 3.1 NG/ML (ref 0–5)
CRP SERPL-MCNC: 0.83 MG/L (ref 0–3.19)
ERYTHROCYTE [SEDIMENTATION RATE] IN BLOOD BY WESTERGREN METHOD: 5 MM/HR (ref 0–10)
INR PPP: 1 (ref 0.8–1.2)
PROTHROMBIN TIME: 10.8 SEC (ref 9–12.5)
TROPONIN I SERPL DL<=0.01 NG/ML-MCNC: <0.006 NG/ML (ref 0–0.03)

## 2021-06-29 PROCEDURE — 85610 PROTHROMBIN TIME: CPT | Mod: PO | Performed by: INTERNAL MEDICINE

## 2021-06-29 PROCEDURE — 85651 RBC SED RATE NONAUTOMATED: CPT | Mod: PO | Performed by: INTERNAL MEDICINE

## 2021-06-29 PROCEDURE — 82378 CARCINOEMBRYONIC ANTIGEN: CPT | Performed by: INTERNAL MEDICINE

## 2021-06-29 PROCEDURE — 36415 COLL VENOUS BLD VENIPUNCTURE: CPT | Mod: PO | Performed by: INTERNAL MEDICINE

## 2021-06-29 PROCEDURE — 85730 THROMBOPLASTIN TIME PARTIAL: CPT | Mod: PO | Performed by: INTERNAL MEDICINE

## 2021-06-29 PROCEDURE — 84484 ASSAY OF TROPONIN QUANT: CPT | Performed by: INTERNAL MEDICINE

## 2021-06-29 PROCEDURE — 86141 C-REACTIVE PROTEIN HS: CPT | Performed by: INTERNAL MEDICINE

## 2021-07-07 ENCOUNTER — PATIENT MESSAGE (OUTPATIENT)
Dept: ADMINISTRATIVE | Facility: HOSPITAL | Age: 66
End: 2021-07-07

## 2021-07-20 ENCOUNTER — LAB VISIT (OUTPATIENT)
Dept: LAB | Facility: HOSPITAL | Age: 66
End: 2021-07-20
Payer: COMMERCIAL

## 2021-07-20 DIAGNOSIS — D50.9 IRON DEFICIENCY ANEMIA, UNSPECIFIED IRON DEFICIENCY ANEMIA TYPE: ICD-10-CM

## 2021-07-20 DIAGNOSIS — Z79.899 ENCOUNTER FOR LONG-TERM (CURRENT) USE OF OTHER MEDICATIONS: Primary | ICD-10-CM

## 2021-07-20 DIAGNOSIS — E55.9 VITAMIN D DEFICIENCY: ICD-10-CM

## 2021-07-20 DIAGNOSIS — N18.2 CKD (CHRONIC KIDNEY DISEASE) STAGE 2, GFR 60-89 ML/MIN: ICD-10-CM

## 2021-07-20 LAB
25(OH)D3+25(OH)D2 SERPL-MCNC: 52 NG/ML (ref 30–96)
ALBUMIN SERPL BCP-MCNC: 3.9 G/DL (ref 3.5–5.2)
ALBUMIN SERPL BCP-MCNC: 3.9 G/DL (ref 3.5–5.2)
ALP SERPL-CCNC: 84 U/L (ref 55–135)
ALT SERPL W/O P-5'-P-CCNC: 51 U/L (ref 10–44)
ANION GAP SERPL CALC-SCNC: 9 MMOL/L (ref 8–16)
ANION GAP SERPL CALC-SCNC: 9 MMOL/L (ref 8–16)
AST SERPL-CCNC: 31 U/L (ref 10–40)
BASOPHILS # BLD AUTO: 0.03 K/UL (ref 0–0.2)
BASOPHILS NFR BLD: 0.7 % (ref 0–1.9)
BILIRUB SERPL-MCNC: 0.7 MG/DL (ref 0.1–1)
BUN SERPL-MCNC: 14 MG/DL (ref 8–23)
BUN SERPL-MCNC: 14 MG/DL (ref 8–23)
CALCIUM SERPL-MCNC: 9.3 MG/DL (ref 8.7–10.5)
CALCIUM SERPL-MCNC: 9.3 MG/DL (ref 8.7–10.5)
CHLORIDE SERPL-SCNC: 110 MMOL/L (ref 95–110)
CHLORIDE SERPL-SCNC: 110 MMOL/L (ref 95–110)
CHOLEST SERPL-MCNC: 145 MG/DL (ref 120–199)
CHOLEST/HDLC SERPL: 3.5 {RATIO} (ref 2–5)
CO2 SERPL-SCNC: 23 MMOL/L (ref 23–29)
CO2 SERPL-SCNC: 23 MMOL/L (ref 23–29)
COMPLEXED PSA SERPL-MCNC: 0.58 NG/ML (ref 0–4)
CREAT SERPL-MCNC: 1 MG/DL (ref 0.5–1.4)
CREAT SERPL-MCNC: 1 MG/DL (ref 0.5–1.4)
DHEA-S SERPL-MCNC: 102.3 UG/DL (ref 48.6–361.8)
DIFFERENTIAL METHOD: ABNORMAL
EOSINOPHIL # BLD AUTO: 0.2 K/UL (ref 0–0.5)
EOSINOPHIL NFR BLD: 3.7 % (ref 0–8)
ERYTHROCYTE [DISTWIDTH] IN BLOOD BY AUTOMATED COUNT: 13.8 % (ref 11.5–14.5)
EST. GFR  (AFRICAN AMERICAN): >60 ML/MIN/1.73 M^2
EST. GFR  (AFRICAN AMERICAN): >60 ML/MIN/1.73 M^2
EST. GFR  (NON AFRICAN AMERICAN): >60 ML/MIN/1.73 M^2
EST. GFR  (NON AFRICAN AMERICAN): >60 ML/MIN/1.73 M^2
ESTIMATED AVG GLUCOSE: 111 MG/DL (ref 68–131)
ESTRADIOL SERPL-MCNC: 18 PG/ML (ref 11–44)
FERRITIN SERPL-MCNC: 53 NG/ML (ref 20–300)
GLUCOSE SERPL-MCNC: 154 MG/DL (ref 70–110)
GLUCOSE SERPL-MCNC: 154 MG/DL (ref 70–110)
HBA1C MFR BLD: 5.5 % (ref 4–5.6)
HCT VFR BLD AUTO: 38 % (ref 40–54)
HDLC SERPL-MCNC: 42 MG/DL (ref 40–75)
HDLC SERPL: 29 % (ref 20–50)
HGB BLD-MCNC: 13 G/DL (ref 14–18)
IMM GRANULOCYTES # BLD AUTO: 0.01 K/UL (ref 0–0.04)
IMM GRANULOCYTES NFR BLD AUTO: 0.2 % (ref 0–0.5)
IRON SERPL-MCNC: 106 UG/DL (ref 45–160)
LDLC SERPL CALC-MCNC: 69.2 MG/DL (ref 63–159)
LH SERPL-ACNC: 1.8 MIU/ML (ref 0.6–12.1)
LYMPHOCYTES # BLD AUTO: 1.4 K/UL (ref 1–4.8)
LYMPHOCYTES NFR BLD: 30.4 % (ref 18–48)
MCH RBC QN AUTO: 31.3 PG (ref 27–31)
MCHC RBC AUTO-ENTMCNC: 34.2 G/DL (ref 32–36)
MCV RBC AUTO: 92 FL (ref 82–98)
MONOCYTES # BLD AUTO: 0.3 K/UL (ref 0.3–1)
MONOCYTES NFR BLD: 6.9 % (ref 4–15)
NEUTROPHILS # BLD AUTO: 2.7 K/UL (ref 1.8–7.7)
NEUTROPHILS NFR BLD: 58.1 % (ref 38–73)
NONHDLC SERPL-MCNC: 103 MG/DL
NRBC BLD-RTO: 0 /100 WBC
PHOSPHATE SERPL-MCNC: 2.5 MG/DL (ref 2.7–4.5)
PLATELET # BLD AUTO: 192 K/UL (ref 150–450)
PMV BLD AUTO: 11.2 FL (ref 9.2–12.9)
POTASSIUM SERPL-SCNC: 4 MMOL/L (ref 3.5–5.1)
POTASSIUM SERPL-SCNC: 4 MMOL/L (ref 3.5–5.1)
PROT SERPL-MCNC: 6.8 G/DL (ref 6–8.4)
PTH-INTACT SERPL-MCNC: 68 PG/ML (ref 9–77)
RBC # BLD AUTO: 4.15 M/UL (ref 4.6–6.2)
SATURATED IRON: 28 % (ref 20–50)
SODIUM SERPL-SCNC: 142 MMOL/L (ref 136–145)
SODIUM SERPL-SCNC: 142 MMOL/L (ref 136–145)
T3FREE SERPL-MCNC: 2.7 PG/ML (ref 2.3–4.2)
T4 FREE SERPL-MCNC: 0.96 NG/DL (ref 0.71–1.51)
TESTOST SERPL-MCNC: 238 NG/DL (ref 304–1227)
TOTAL IRON BINDING CAPACITY: 373 UG/DL (ref 250–450)
TRANSFERRIN SERPL-MCNC: 252 MG/DL (ref 200–375)
TRIGL SERPL-MCNC: 169 MG/DL (ref 30–150)
TSH SERPL DL<=0.005 MIU/L-ACNC: 1.65 UIU/ML (ref 0.4–4)
VIT B12 SERPL-MCNC: 1813 PG/ML (ref 210–950)
WBC # BLD AUTO: 4.61 K/UL (ref 3.9–12.7)

## 2021-07-20 PROCEDURE — 84481 FREE ASSAY (FT-3): CPT | Performed by: NURSE PRACTITIONER

## 2021-07-20 PROCEDURE — 82670 ASSAY OF TOTAL ESTRADIOL: CPT | Performed by: NURSE PRACTITIONER

## 2021-07-20 PROCEDURE — 85025 COMPLETE CBC W/AUTO DIFF WBC: CPT | Performed by: NURSE PRACTITIONER

## 2021-07-20 PROCEDURE — 80069 RENAL FUNCTION PANEL: CPT | Performed by: INTERNAL MEDICINE

## 2021-07-20 PROCEDURE — 84305 ASSAY OF SOMATOMEDIN: CPT | Performed by: NURSE PRACTITIONER

## 2021-07-20 PROCEDURE — 80053 COMPREHEN METABOLIC PANEL: CPT | Performed by: NURSE PRACTITIONER

## 2021-07-20 PROCEDURE — 84153 ASSAY OF PSA TOTAL: CPT | Performed by: NURSE PRACTITIONER

## 2021-07-20 PROCEDURE — 82306 VITAMIN D 25 HYDROXY: CPT | Performed by: INTERNAL MEDICINE

## 2021-07-20 PROCEDURE — 83002 ASSAY OF GONADOTROPIN (LH): CPT | Performed by: NURSE PRACTITIONER

## 2021-07-20 PROCEDURE — 84439 ASSAY OF FREE THYROXINE: CPT | Performed by: NURSE PRACTITIONER

## 2021-07-20 PROCEDURE — 82627 DEHYDROEPIANDROSTERONE: CPT | Performed by: NURSE PRACTITIONER

## 2021-07-20 PROCEDURE — 36415 COLL VENOUS BLD VENIPUNCTURE: CPT | Mod: PO | Performed by: NURSE PRACTITIONER

## 2021-07-20 PROCEDURE — 84443 ASSAY THYROID STIM HORMONE: CPT | Performed by: NURSE PRACTITIONER

## 2021-07-20 PROCEDURE — 82728 ASSAY OF FERRITIN: CPT | Performed by: INTERNAL MEDICINE

## 2021-07-20 PROCEDURE — 80061 LIPID PANEL: CPT | Performed by: NURSE PRACTITIONER

## 2021-07-20 PROCEDURE — 83970 ASSAY OF PARATHORMONE: CPT | Performed by: INTERNAL MEDICINE

## 2021-07-20 PROCEDURE — 84402 ASSAY OF FREE TESTOSTERONE: CPT | Performed by: NURSE PRACTITIONER

## 2021-07-20 PROCEDURE — 83036 HEMOGLOBIN GLYCOSYLATED A1C: CPT | Performed by: NURSE PRACTITIONER

## 2021-07-20 PROCEDURE — 83540 ASSAY OF IRON: CPT | Performed by: INTERNAL MEDICINE

## 2021-07-20 PROCEDURE — 84403 ASSAY OF TOTAL TESTOSTERONE: CPT | Performed by: NURSE PRACTITIONER

## 2021-07-20 PROCEDURE — 82607 VITAMIN B-12: CPT | Performed by: NURSE PRACTITIONER

## 2021-07-23 LAB
IGF-I SERPL-MCNC: 119 NG/ML (ref 33–220)
IGF-I Z-SCORE SERPL: 0.25 SD
TESTOST FREE SERPL-MCNC: 2.8 PG/ML

## 2021-10-26 PROBLEM — N13.8 BPH WITH URINARY OBSTRUCTION: Status: ACTIVE | Noted: 2021-10-26

## 2021-10-26 PROBLEM — N40.1 BPH WITH URINARY OBSTRUCTION: Status: ACTIVE | Noted: 2021-10-26

## 2021-11-15 ENCOUNTER — TELEPHONE (OUTPATIENT)
Dept: FAMILY MEDICINE | Facility: CLINIC | Age: 66
End: 2021-11-15
Payer: COMMERCIAL

## 2021-11-15 DIAGNOSIS — M25.50 ARTHRALGIA, UNSPECIFIED JOINT: Primary | ICD-10-CM

## 2021-11-18 ENCOUNTER — TELEPHONE (OUTPATIENT)
Dept: RHEUMATOLOGY | Facility: CLINIC | Age: 66
End: 2021-11-18
Payer: COMMERCIAL

## 2022-03-10 ENCOUNTER — PATIENT MESSAGE (OUTPATIENT)
Dept: RHEUMATOLOGY | Facility: CLINIC | Age: 67
End: 2022-03-10
Payer: COMMERCIAL

## 2022-04-20 ENCOUNTER — LAB VISIT (OUTPATIENT)
Dept: LAB | Facility: HOSPITAL | Age: 67
End: 2022-04-20
Attending: FAMILY MEDICINE
Payer: COMMERCIAL

## 2022-04-20 DIAGNOSIS — R53.83 FATIGUE: Primary | ICD-10-CM

## 2022-04-20 DIAGNOSIS — Z79.899 ENCOUNTER FOR LONG-TERM (CURRENT) USE OF OTHER MEDICATIONS: ICD-10-CM

## 2022-04-20 DIAGNOSIS — M25.50 PAIN IN JOINT, MULTIPLE SITES: ICD-10-CM

## 2022-04-20 DIAGNOSIS — E78.2 MIXED HYPERLIPIDEMIA: ICD-10-CM

## 2022-04-20 DIAGNOSIS — D64.9 ANEMIA, UNSPECIFIED: ICD-10-CM

## 2022-04-20 DIAGNOSIS — I73.9 PERIPHERAL VASCULAR DISEASE, UNSPECIFIED: ICD-10-CM

## 2022-04-20 DIAGNOSIS — D50.9 IRON DEFICIENCY ANEMIA, UNSPECIFIED: ICD-10-CM

## 2022-04-20 DIAGNOSIS — I70.0 ATHEROSCLEROSIS OF AORTA: ICD-10-CM

## 2022-04-20 DIAGNOSIS — T50.905A IMPAIRED GLUCOSE TOLERANCE ASSOCIATED WITH DRUGS: ICD-10-CM

## 2022-04-20 DIAGNOSIS — N18.9 CHRONIC KIDNEY DISEASE, UNSPECIFIED: ICD-10-CM

## 2022-04-20 DIAGNOSIS — I35.1 AORTIC INCOMPETENCE: ICD-10-CM

## 2022-04-20 DIAGNOSIS — E55.9 AVITAMINOSIS D: ICD-10-CM

## 2022-04-20 DIAGNOSIS — I25.10 CORONARY ATHEROSCLEROSIS OF NATIVE CORONARY ARTERY: ICD-10-CM

## 2022-04-20 DIAGNOSIS — R00.1 SEVERE SINUS BRADYCARDIA: ICD-10-CM

## 2022-04-20 DIAGNOSIS — R73.02 IMPAIRED GLUCOSE TOLERANCE ASSOCIATED WITH DRUGS: ICD-10-CM

## 2022-04-20 LAB
ALBUMIN SERPL BCP-MCNC: 3.9 G/DL (ref 3.5–5.2)
ALP SERPL-CCNC: 107 U/L (ref 55–135)
ALT SERPL W/O P-5'-P-CCNC: 20 U/L (ref 10–44)
ANION GAP SERPL CALC-SCNC: 12 MMOL/L (ref 8–16)
AST SERPL-CCNC: 23 U/L (ref 10–40)
BASOPHILS # BLD AUTO: 0.03 K/UL (ref 0–0.2)
BASOPHILS NFR BLD: 0.8 % (ref 0–1.9)
BILIRUB SERPL-MCNC: 0.6 MG/DL (ref 0.1–1)
BUN SERPL-MCNC: 11 MG/DL (ref 8–23)
CALCIUM SERPL-MCNC: 9.5 MG/DL (ref 8.7–10.5)
CHLORIDE SERPL-SCNC: 105 MMOL/L (ref 95–110)
CHOLEST SERPL-MCNC: 165 MG/DL (ref 120–199)
CHOLEST/HDLC SERPL: 4.3 {RATIO} (ref 2–5)
CO2 SERPL-SCNC: 24 MMOL/L (ref 23–29)
COMPLEXED PSA SERPL-MCNC: 1 NG/ML (ref 0–4)
CREAT SERPL-MCNC: 0.8 MG/DL (ref 0.5–1.4)
DIFFERENTIAL METHOD: ABNORMAL
EOSINOPHIL # BLD AUTO: 0.2 K/UL (ref 0–0.5)
EOSINOPHIL NFR BLD: 5.5 % (ref 0–8)
ERYTHROCYTE [DISTWIDTH] IN BLOOD BY AUTOMATED COUNT: 12.5 % (ref 11.5–14.5)
EST. GFR  (AFRICAN AMERICAN): >60 ML/MIN/1.73 M^2
EST. GFR  (NON AFRICAN AMERICAN): >60 ML/MIN/1.73 M^2
ESTIMATED AVG GLUCOSE: 105 MG/DL (ref 68–131)
FERRITIN SERPL-MCNC: 35 NG/ML (ref 20–300)
GLUCOSE SERPL-MCNC: 109 MG/DL (ref 70–110)
HBA1C MFR BLD: 5.3 % (ref 4–5.6)
HCT VFR BLD AUTO: 38.1 % (ref 40–54)
HDLC SERPL-MCNC: 38 MG/DL (ref 40–75)
HDLC SERPL: 23 % (ref 20–50)
HGB BLD-MCNC: 12.5 G/DL (ref 14–18)
IMM GRANULOCYTES # BLD AUTO: 0.01 K/UL (ref 0–0.04)
IMM GRANULOCYTES NFR BLD AUTO: 0.3 % (ref 0–0.5)
IRON SERPL-MCNC: 54 UG/DL (ref 45–160)
LDLC SERPL CALC-MCNC: 94.2 MG/DL (ref 63–159)
LYMPHOCYTES # BLD AUTO: 1.1 K/UL (ref 1–4.8)
LYMPHOCYTES NFR BLD: 29.7 % (ref 18–48)
MAGNESIUM SERPL-MCNC: 1.9 MG/DL (ref 1.6–2.6)
MCH RBC QN AUTO: 29.6 PG (ref 27–31)
MCHC RBC AUTO-ENTMCNC: 32.8 G/DL (ref 32–36)
MCV RBC AUTO: 90 FL (ref 82–98)
MONOCYTES # BLD AUTO: 0.3 K/UL (ref 0.3–1)
MONOCYTES NFR BLD: 7.4 % (ref 4–15)
NEUTROPHILS # BLD AUTO: 2.1 K/UL (ref 1.8–7.7)
NEUTROPHILS NFR BLD: 56.3 % (ref 38–73)
NONHDLC SERPL-MCNC: 127 MG/DL
NRBC BLD-RTO: 0 /100 WBC
PLATELET # BLD AUTO: 165 K/UL (ref 150–450)
PMV BLD AUTO: 10.6 FL (ref 9.2–12.9)
POTASSIUM SERPL-SCNC: 4.9 MMOL/L (ref 3.5–5.1)
PROT SERPL-MCNC: 7 G/DL (ref 6–8.4)
RBC # BLD AUTO: 4.22 M/UL (ref 4.6–6.2)
SATURATED IRON: 13 % (ref 20–50)
SODIUM SERPL-SCNC: 141 MMOL/L (ref 136–145)
TESTOST SERPL-MCNC: 228 NG/DL (ref 304–1227)
TOTAL IRON BINDING CAPACITY: 414 UG/DL (ref 250–450)
TRANSFERRIN SERPL-MCNC: 280 MG/DL (ref 200–375)
TRIGL SERPL-MCNC: 164 MG/DL (ref 30–150)
TSH SERPL DL<=0.005 MIU/L-ACNC: 0.83 UIU/ML (ref 0.4–4)
WBC # BLD AUTO: 3.8 K/UL (ref 3.9–12.7)

## 2022-04-20 PROCEDURE — 84402 ASSAY OF FREE TESTOSTERONE: CPT | Performed by: INTERNAL MEDICINE

## 2022-04-20 PROCEDURE — 80061 LIPID PANEL: CPT | Performed by: INTERNAL MEDICINE

## 2022-04-20 PROCEDURE — 80053 COMPREHEN METABOLIC PANEL: CPT | Performed by: INTERNAL MEDICINE

## 2022-04-20 PROCEDURE — 85025 COMPLETE CBC W/AUTO DIFF WBC: CPT | Mod: PO | Performed by: INTERNAL MEDICINE

## 2022-04-20 PROCEDURE — 84403 ASSAY OF TOTAL TESTOSTERONE: CPT | Performed by: INTERNAL MEDICINE

## 2022-04-20 PROCEDURE — 83036 HEMOGLOBIN GLYCOSYLATED A1C: CPT | Performed by: INTERNAL MEDICINE

## 2022-04-20 PROCEDURE — 36415 COLL VENOUS BLD VENIPUNCTURE: CPT | Mod: PO | Performed by: INTERNAL MEDICINE

## 2022-04-20 PROCEDURE — 82728 ASSAY OF FERRITIN: CPT | Performed by: INTERNAL MEDICINE

## 2022-04-20 PROCEDURE — 84153 ASSAY OF PSA TOTAL: CPT | Performed by: INTERNAL MEDICINE

## 2022-04-20 PROCEDURE — 84466 ASSAY OF TRANSFERRIN: CPT | Performed by: INTERNAL MEDICINE

## 2022-04-20 PROCEDURE — 84443 ASSAY THYROID STIM HORMONE: CPT | Performed by: INTERNAL MEDICINE

## 2022-04-20 PROCEDURE — 83735 ASSAY OF MAGNESIUM: CPT | Performed by: INTERNAL MEDICINE

## 2022-04-25 ENCOUNTER — OFFICE VISIT (OUTPATIENT)
Dept: FAMILY MEDICINE | Facility: CLINIC | Age: 67
End: 2022-04-25
Payer: COMMERCIAL

## 2022-04-25 VITALS
OXYGEN SATURATION: 97 % | DIASTOLIC BLOOD PRESSURE: 82 MMHG | HEART RATE: 61 BPM | BODY MASS INDEX: 33.48 KG/M2 | SYSTOLIC BLOOD PRESSURE: 136 MMHG | WEIGHT: 240.06 LBS | TEMPERATURE: 98 F

## 2022-04-25 DIAGNOSIS — R11.0 NAUSEA: ICD-10-CM

## 2022-04-25 DIAGNOSIS — D64.9 ANEMIA, UNSPECIFIED TYPE: Primary | ICD-10-CM

## 2022-04-25 DIAGNOSIS — E34.9 TESTOSTERONE DEFICIENCY: ICD-10-CM

## 2022-04-25 LAB — TESTOST FREE SERPL-MCNC: 3.8 PG/ML

## 2022-04-25 PROCEDURE — 1159F PR MEDICATION LIST DOCUMENTED IN MEDICAL RECORD: ICD-10-PCS | Mod: CPTII,S$GLB,, | Performed by: FAMILY MEDICINE

## 2022-04-25 PROCEDURE — 1159F MED LIST DOCD IN RCRD: CPT | Mod: CPTII,S$GLB,, | Performed by: FAMILY MEDICINE

## 2022-04-25 PROCEDURE — 3079F DIAST BP 80-89 MM HG: CPT | Mod: CPTII,S$GLB,, | Performed by: FAMILY MEDICINE

## 2022-04-25 PROCEDURE — 99214 PR OFFICE/OUTPT VISIT, EST, LEVL IV, 30-39 MIN: ICD-10-PCS | Mod: S$GLB,,, | Performed by: FAMILY MEDICINE

## 2022-04-25 PROCEDURE — 3008F PR BODY MASS INDEX (BMI) DOCUMENTED: ICD-10-PCS | Mod: CPTII,S$GLB,, | Performed by: FAMILY MEDICINE

## 2022-04-25 PROCEDURE — 3008F BODY MASS INDEX DOCD: CPT | Mod: CPTII,S$GLB,, | Performed by: FAMILY MEDICINE

## 2022-04-25 PROCEDURE — 99999 PR PBB SHADOW E&M-EST. PATIENT-LVL III: ICD-10-PCS | Mod: PBBFAC,,, | Performed by: FAMILY MEDICINE

## 2022-04-25 PROCEDURE — 3075F SYST BP GE 130 - 139MM HG: CPT | Mod: CPTII,S$GLB,, | Performed by: FAMILY MEDICINE

## 2022-04-25 PROCEDURE — 3044F HG A1C LEVEL LT 7.0%: CPT | Mod: CPTII,S$GLB,, | Performed by: FAMILY MEDICINE

## 2022-04-25 PROCEDURE — 3075F PR MOST RECENT SYSTOLIC BLOOD PRESS GE 130-139MM HG: ICD-10-PCS | Mod: CPTII,S$GLB,, | Performed by: FAMILY MEDICINE

## 2022-04-25 PROCEDURE — 3079F PR MOST RECENT DIASTOLIC BLOOD PRESSURE 80-89 MM HG: ICD-10-PCS | Mod: CPTII,S$GLB,, | Performed by: FAMILY MEDICINE

## 2022-04-25 PROCEDURE — 99999 PR PBB SHADOW E&M-EST. PATIENT-LVL III: CPT | Mod: PBBFAC,,, | Performed by: FAMILY MEDICINE

## 2022-04-25 PROCEDURE — 3044F PR MOST RECENT HEMOGLOBIN A1C LEVEL <7.0%: ICD-10-PCS | Mod: CPTII,S$GLB,, | Performed by: FAMILY MEDICINE

## 2022-04-25 PROCEDURE — 99214 OFFICE O/P EST MOD 30 MIN: CPT | Mod: S$GLB,,, | Performed by: FAMILY MEDICINE

## 2022-04-25 RX ORDER — OMEPRAZOLE 40 MG/1
40 CAPSULE, DELAYED RELEASE ORAL DAILY
Qty: 30 CAPSULE | Refills: 11 | Status: SHIPPED | OUTPATIENT
Start: 2022-04-25

## 2022-04-25 RX ORDER — ONDANSETRON 8 MG/1
8 TABLET, ORALLY DISINTEGRATING ORAL 3 TIMES DAILY
Qty: 30 TABLET | Refills: 1 | Status: SHIPPED | OUTPATIENT
Start: 2022-04-25

## 2022-04-25 RX ORDER — CELECOXIB 200 MG/1
200 CAPSULE ORAL DAILY
COMMUNITY
Start: 2022-04-14

## 2022-04-25 RX ORDER — PHENOL 1.4 %
AEROSOL, SPRAY (ML) MUCOUS MEMBRANE
COMMUNITY
Start: 2022-04-18

## 2022-04-25 NOTE — PROGRESS NOTES
Subjective:       Patient ID: Tex Pickard III is a 66 y.o. male.    Chief Complaint: blood work (Look at blood work/) and Fatigue    HPI     S/p total right hip replacement by Dr. Ferdinand Pretty about 5 weeks ago.    Reviewed recent labs ordered by his cardiologist. Anemic and low testosterone. All other results were acceptable.     [facility] for testosterone [facility]. Stopped the injections before surgery. Just resumed 1 week ago.     Sees Dr. Shipman, cardiologist, for htn.  Had negative cardiac w/u.      Review of Systems      Review of Systems   Constitutional: Negative for fever and chills.   HENT: Negative for hearing loss and neck stiffness.    Eyes: Negative for redness and itching.   Respiratory: Negative for cough and choking.    Cardiovascular: Negative for chest pain and leg swelling.  Abdomen: Negative for abdominal pain and blood in stool.   Genitourinary: Negative for dysuria and flank pain.   Musculoskeletal: Negative for back pain and gait problem.   Neurological: Negative for light-headedness and headaches.   Hematological: Negative for adenopathy.   Psychiatric/Behavioral: Negative for behavioral problems.     Objective:      Physical Exam  HENT:      Head: Atraumatic.   Eyes:      Conjunctiva/sclera: Conjunctivae normal.      Pupils: Pupils are equal, round, and reactive to light.   Cardiovascular:      Rate and Rhythm: Normal rate and regular rhythm.      Heart sounds: No murmur heard.  Pulmonary:      Effort: Pulmonary effort is normal.      Breath sounds: Normal breath sounds. No wheezing.   Musculoskeletal:      Cervical back: Normal range of motion.   Lymphadenopathy:      Cervical: No cervical adenopathy.         Assessment:       1. Anemia, unspecified type    2. Nausea        Plan:       Anemia, unspecified type  -     CBC Auto Differential; Future    Nausea    Other orders  -     omeprazole (PRILOSEC) 40 MG capsule; Take 1 capsule (40 mg total) by mouth once  daily.  Dispense: 30 capsule; Refill: 11  -     ondansetron (ZOFRAN-ODT) 8 MG TbDL; Take 1 tablet (8 mg total) by mouth 3 (three) times daily.  Dispense: 30 tablet; Refill: 1                Plan:  Anemia due to hip replacement. Recheck cbc in 3 months  Start prilosec and zofran  Cont with testosterone injections  Cont all other meds        Medication List with Changes/Refills   New Medications    OMEPRAZOLE (PRILOSEC) 40 MG CAPSULE    Take 1 capsule (40 mg total) by mouth once daily.    ONDANSETRON (ZOFRAN-ODT) 8 MG TBDL    Take 1 tablet (8 mg total) by mouth 3 (three) times daily.   Current Medications    ASPIRIN 81 MG CHEW    Take 81 mg by mouth once daily.    BYSTOLIC 20 MG TAB    20 mg.     CELECOXIB (CELEBREX) 200 MG CAPSULE    Take 200 mg by mouth once daily.    CHOLECALCIFEROL, VITAMIN D3, 125 MCG (5,000 UNIT) CAPSULE    Take 1 capsule (5,000 Units total) by mouth twice a week.    CYANOCOBALAMIN, VITAMIN B-12, (VITAMIN B-12) 2,500 MCG SUBL    Place 2,500 mcg under the tongue every other day.    FINASTERIDE (PROPECIA) 1 MG TABLET    Take 1 mg by mouth once daily.    GLUC LADD/CHONDRO LADD A/VIT C/MN (GLUCOSAMINE 1500 COMPLEX ORAL)    Take by mouth.    LOSARTAN (COZAAR) 50 MG TABLET    Take 25 mg by mouth once daily.     MULTIVITAMIN CAPSULE    Take 1 capsule by mouth once daily.    OXYMETAZOLINE (AFRIN) 0.05 % NASAL SPRAY    2 sprays by Nasal route nightly as needed.     PRAVASTATIN (PRAVACHOL) 10 MG TABLET    Take 10 mg by mouth once daily.     SLOW RELEASE IRON 144 MG (45 MG IRON) TBSR    Take 1 tablet with vitamin C 250 mg daily    TESTOSTERONE CYPIONATE (DEPOTESTOTERONE CYPIONATE) 200 MG/ML INJECTION    Inject 0.5 mLs (100 mg total) into the muscle every 14 (fourteen) days.

## 2022-05-09 ENCOUNTER — PATIENT MESSAGE (OUTPATIENT)
Dept: SMOKING CESSATION | Facility: CLINIC | Age: 67
End: 2022-05-09
Payer: COMMERCIAL

## 2022-05-27 ENCOUNTER — TELEPHONE (OUTPATIENT)
Dept: RHEUMATOLOGY | Facility: CLINIC | Age: 67
End: 2022-05-27
Payer: COMMERCIAL

## 2022-07-18 ENCOUNTER — LAB VISIT (OUTPATIENT)
Dept: LAB | Facility: HOSPITAL | Age: 67
End: 2022-07-18
Attending: FAMILY MEDICINE
Payer: COMMERCIAL

## 2022-07-18 DIAGNOSIS — D64.9 ANEMIA, UNSPECIFIED TYPE: ICD-10-CM

## 2022-07-18 LAB
BASOPHILS # BLD AUTO: 0.05 K/UL (ref 0–0.2)
BASOPHILS NFR BLD: 0.9 % (ref 0–1.9)
DIFFERENTIAL METHOD: ABNORMAL
EOSINOPHIL # BLD AUTO: 0.2 K/UL (ref 0–0.5)
EOSINOPHIL NFR BLD: 4.3 % (ref 0–8)
ERYTHROCYTE [DISTWIDTH] IN BLOOD BY AUTOMATED COUNT: 13.5 % (ref 11.5–14.5)
HCT VFR BLD AUTO: 40.9 % (ref 40–54)
HGB BLD-MCNC: 13.3 G/DL (ref 14–18)
IMM GRANULOCYTES # BLD AUTO: 0.01 K/UL (ref 0–0.04)
IMM GRANULOCYTES NFR BLD AUTO: 0.2 % (ref 0–0.5)
LYMPHOCYTES # BLD AUTO: 1.9 K/UL (ref 1–4.8)
LYMPHOCYTES NFR BLD: 35.6 % (ref 18–48)
MCH RBC QN AUTO: 27.9 PG (ref 27–31)
MCHC RBC AUTO-ENTMCNC: 32.5 G/DL (ref 32–36)
MCV RBC AUTO: 86 FL (ref 82–98)
MONOCYTES # BLD AUTO: 0.5 K/UL (ref 0.3–1)
MONOCYTES NFR BLD: 9 % (ref 4–15)
NEUTROPHILS # BLD AUTO: 2.7 K/UL (ref 1.8–7.7)
NEUTROPHILS NFR BLD: 50 % (ref 38–73)
NRBC BLD-RTO: 0 /100 WBC
PLATELET # BLD AUTO: 181 K/UL (ref 150–450)
PMV BLD AUTO: 11.7 FL (ref 9.2–12.9)
RBC # BLD AUTO: 4.77 M/UL (ref 4.6–6.2)
WBC # BLD AUTO: 5.31 K/UL (ref 3.9–12.7)

## 2022-07-18 PROCEDURE — 36415 COLL VENOUS BLD VENIPUNCTURE: CPT | Mod: PO | Performed by: FAMILY MEDICINE

## 2022-07-18 PROCEDURE — 85025 COMPLETE CBC W/AUTO DIFF WBC: CPT | Performed by: FAMILY MEDICINE

## 2022-08-01 ENCOUNTER — OFFICE VISIT (OUTPATIENT)
Dept: FAMILY MEDICINE | Facility: CLINIC | Age: 67
End: 2022-08-01
Payer: COMMERCIAL

## 2022-08-01 VITALS
HEART RATE: 68 BPM | SYSTOLIC BLOOD PRESSURE: 132 MMHG | HEIGHT: 71 IN | WEIGHT: 256.63 LBS | DIASTOLIC BLOOD PRESSURE: 84 MMHG | BODY MASS INDEX: 35.93 KG/M2 | OXYGEN SATURATION: 97 % | TEMPERATURE: 98 F

## 2022-08-01 DIAGNOSIS — Z12.5 PROSTATE CANCER SCREENING: ICD-10-CM

## 2022-08-01 DIAGNOSIS — I10 HYPERTENSION, UNSPECIFIED TYPE: Primary | ICD-10-CM

## 2022-08-01 DIAGNOSIS — E34.9 TESTOSTERONE DEFICIENCY: ICD-10-CM

## 2022-08-01 DIAGNOSIS — M25.50 ARTHRALGIA, UNSPECIFIED JOINT: ICD-10-CM

## 2022-08-01 PROCEDURE — 3044F PR MOST RECENT HEMOGLOBIN A1C LEVEL <7.0%: ICD-10-PCS | Mod: CPTII,S$GLB,, | Performed by: FAMILY MEDICINE

## 2022-08-01 PROCEDURE — 1159F MED LIST DOCD IN RCRD: CPT | Mod: CPTII,S$GLB,, | Performed by: FAMILY MEDICINE

## 2022-08-01 PROCEDURE — 99214 PR OFFICE/OUTPT VISIT, EST, LEVL IV, 30-39 MIN: ICD-10-PCS | Mod: S$GLB,,, | Performed by: FAMILY MEDICINE

## 2022-08-01 PROCEDURE — 3008F PR BODY MASS INDEX (BMI) DOCUMENTED: ICD-10-PCS | Mod: CPTII,S$GLB,, | Performed by: FAMILY MEDICINE

## 2022-08-01 PROCEDURE — 3079F PR MOST RECENT DIASTOLIC BLOOD PRESSURE 80-89 MM HG: ICD-10-PCS | Mod: CPTII,S$GLB,, | Performed by: FAMILY MEDICINE

## 2022-08-01 PROCEDURE — 3075F PR MOST RECENT SYSTOLIC BLOOD PRESS GE 130-139MM HG: ICD-10-PCS | Mod: CPTII,S$GLB,, | Performed by: FAMILY MEDICINE

## 2022-08-01 PROCEDURE — 4010F PR ACE/ARB THEARPY RXD/TAKEN: ICD-10-PCS | Mod: CPTII,S$GLB,, | Performed by: FAMILY MEDICINE

## 2022-08-01 PROCEDURE — 3044F HG A1C LEVEL LT 7.0%: CPT | Mod: CPTII,S$GLB,, | Performed by: FAMILY MEDICINE

## 2022-08-01 PROCEDURE — 3075F SYST BP GE 130 - 139MM HG: CPT | Mod: CPTII,S$GLB,, | Performed by: FAMILY MEDICINE

## 2022-08-01 PROCEDURE — 3008F BODY MASS INDEX DOCD: CPT | Mod: CPTII,S$GLB,, | Performed by: FAMILY MEDICINE

## 2022-08-01 PROCEDURE — 99999 PR PBB SHADOW E&M-EST. PATIENT-LVL III: ICD-10-PCS | Mod: PBBFAC,,, | Performed by: FAMILY MEDICINE

## 2022-08-01 PROCEDURE — 99214 OFFICE O/P EST MOD 30 MIN: CPT | Mod: S$GLB,,, | Performed by: FAMILY MEDICINE

## 2022-08-01 PROCEDURE — 1159F PR MEDICATION LIST DOCUMENTED IN MEDICAL RECORD: ICD-10-PCS | Mod: CPTII,S$GLB,, | Performed by: FAMILY MEDICINE

## 2022-08-01 PROCEDURE — 4010F ACE/ARB THERAPY RXD/TAKEN: CPT | Mod: CPTII,S$GLB,, | Performed by: FAMILY MEDICINE

## 2022-08-01 PROCEDURE — 3079F DIAST BP 80-89 MM HG: CPT | Mod: CPTII,S$GLB,, | Performed by: FAMILY MEDICINE

## 2022-08-01 PROCEDURE — 99999 PR PBB SHADOW E&M-EST. PATIENT-LVL III: CPT | Mod: PBBFAC,,, | Performed by: FAMILY MEDICINE

## 2022-08-01 RX ORDER — LOSARTAN POTASSIUM 25 MG/1
TABLET ORAL
COMMUNITY
Start: 2022-06-01

## 2022-08-01 NOTE — PROGRESS NOTES
Subjective:       Patient ID: Tex Pickard III is a 66 y.o. male.    Chief Complaint: Hypertension    HPI     Here for a f/u    htn stable.     S/p total right hip replacement by Dr. Ferdinand Pretty about 4 months ago.       Reviewed recent cbc.       Sees a Dr. Joshi, [facility], for testosterone injections.      Sees Dr. Shipman, cardiologist, for htn.  Had negative cardiac w/u.      Review of Systems      Review of Systems   Constitutional: Negative for fever and chills.   HENT: Negative for hearing loss and neck stiffness.    Eyes: Negative for redness and itching.   Respiratory: Negative for cough and choking.    Cardiovascular: Negative for chest pain and leg swelling.  Abdomen: Negative for abdominal pain and blood in stool.   Genitourinary: Negative for dysuria and flank pain.   Musculoskeletal: Negative for back pain and gait problem.   Neurological: Negative for light-headedness and headaches.   Hematological: Negative for adenopathy.   Psychiatric/Behavioral: Negative for behavioral problems.     Objective:      Physical Exam  HENT:      Head: Atraumatic.   Eyes:      Conjunctiva/sclera: Conjunctivae normal.      Pupils: Pupils are equal, round, and reactive to light.   Cardiovascular:      Rate and Rhythm: Normal rate and regular rhythm.      Heart sounds: No murmur heard.  Pulmonary:      Effort: Pulmonary effort is normal.      Breath sounds: Normal breath sounds. No wheezing.   Musculoskeletal:      Cervical back: Normal range of motion.   Lymphadenopathy:      Cervical: No cervical adenopathy.         Assessment:       1. Hypertension, unspecified type    2. Testosterone deficiency    3. Arthralgia, unspecified joint    4. Prostate cancer screening        Plan:       Hypertension, unspecified type  -     Comprehensive Metabolic Panel; Future  -     Lipid Panel; Future  -     CBC Auto Differential; Future    Testosterone deficiency    Arthralgia, unspecified joint    Prostate cancer screening  -      PSA, Screening; Future                  Plan:  Labs today  Cont current meds      Medication List with Changes/Refills   Current Medications    ASPIRIN 81 MG CHEW    Take 81 mg by mouth once daily.    BYSTOLIC 20 MG TAB    20 mg.     CELECOXIB (CELEBREX) 200 MG CAPSULE    Take 200 mg by mouth once daily.    CHOLECALCIFEROL, VITAMIN D3, 125 MCG (5,000 UNIT) CAPSULE    Take 1 capsule (5,000 Units total) by mouth twice a week.    CYANOCOBALAMIN, VITAMIN B-12, (VITAMIN B-12) 2,500 MCG SUBL    Place 2,500 mcg under the tongue every other day.    FINASTERIDE (PROPECIA) 1 MG TABLET    Take 1 mg by mouth once daily.    GLUC LADD/CHONDRO LADD A/VIT C/MN (GLUCOSAMINE 1500 COMPLEX ORAL)    Take by mouth.    LOSARTAN (COZAAR) 25 MG TABLET        MULTIVITAMIN CAPSULE    Take 1 capsule by mouth once daily.    OMEPRAZOLE (PRILOSEC) 40 MG CAPSULE    Take 1 capsule (40 mg total) by mouth once daily.    ONDANSETRON (ZOFRAN-ODT) 8 MG TBDL    Take 1 tablet (8 mg total) by mouth 3 (three) times daily.    OXYMETAZOLINE (AFRIN) 0.05 % NASAL SPRAY    2 sprays by Nasal route nightly as needed.     PRAVASTATIN (PRAVACHOL) 10 MG TABLET    Take 10 mg by mouth once daily.     SLOW RELEASE IRON 144 MG (45 MG IRON) TBSR    Take 1 tablet with vitamin C 250 mg daily    TESTOSTERONE CYPIONATE (DEPOTESTOTERONE CYPIONATE) 200 MG/ML INJECTION    Inject 0.5 mLs (100 mg total) into the muscle every 14 (fourteen) days.   Discontinued Medications    LOSARTAN (COZAAR) 50 MG TABLET    Take 25 mg by mouth once daily.

## 2022-12-27 ENCOUNTER — LAB VISIT (OUTPATIENT)
Dept: LAB | Facility: HOSPITAL | Age: 67
End: 2022-12-27
Payer: COMMERCIAL

## 2022-12-27 DIAGNOSIS — D64.9 ANEMIA, UNSPECIFIED: ICD-10-CM

## 2022-12-27 DIAGNOSIS — Z79.899 ENCOUNTER FOR LONG-TERM (CURRENT) USE OF OTHER MEDICATIONS: ICD-10-CM

## 2022-12-27 DIAGNOSIS — E78.2 MIXED HYPERLIPIDEMIA: ICD-10-CM

## 2022-12-27 DIAGNOSIS — I35.1 AORTIC INCOMPETENCE: ICD-10-CM

## 2022-12-27 DIAGNOSIS — E55.9 AVITAMINOSIS D: ICD-10-CM

## 2022-12-27 DIAGNOSIS — M25.50 PAIN IN JOINT, MULTIPLE SITES: ICD-10-CM

## 2022-12-27 DIAGNOSIS — R73.02: ICD-10-CM

## 2022-12-27 DIAGNOSIS — R00.1 SEVERE SINUS BRADYCARDIA: ICD-10-CM

## 2022-12-27 DIAGNOSIS — I70.0 ATHEROSCLEROSIS OF AORTA: ICD-10-CM

## 2022-12-27 DIAGNOSIS — N18.9 CHRONIC KIDNEY DISEASE, UNSPECIFIED: ICD-10-CM

## 2022-12-27 DIAGNOSIS — R53.83 FATIGUE: Primary | ICD-10-CM

## 2022-12-27 DIAGNOSIS — I25.10 CORONARY ATHEROSCLEROSIS OF NATIVE CORONARY ARTERY: ICD-10-CM

## 2022-12-27 LAB
25(OH)D3+25(OH)D2 SERPL-MCNC: 30 NG/ML (ref 30–96)
ALBUMIN SERPL BCP-MCNC: 3.9 G/DL (ref 3.5–5.2)
ALP SERPL-CCNC: 75 U/L (ref 55–135)
ALT SERPL W/O P-5'-P-CCNC: 48 U/L (ref 10–44)
ANION GAP SERPL CALC-SCNC: 8 MMOL/L (ref 8–16)
AST SERPL-CCNC: 25 U/L (ref 10–40)
BASOPHILS # BLD AUTO: 0.02 K/UL (ref 0–0.2)
BASOPHILS NFR BLD: 0.5 % (ref 0–1.9)
BILIRUB SERPL-MCNC: 0.5 MG/DL (ref 0.1–1)
BUN SERPL-MCNC: 12 MG/DL (ref 8–23)
CALCIUM SERPL-MCNC: 9.1 MG/DL (ref 8.7–10.5)
CHLORIDE SERPL-SCNC: 106 MMOL/L (ref 95–110)
CHOLEST SERPL-MCNC: 145 MG/DL (ref 120–199)
CHOLEST/HDLC SERPL: 3.4 {RATIO} (ref 2–5)
CO2 SERPL-SCNC: 28 MMOL/L (ref 23–29)
COMPLEXED PSA SERPL-MCNC: 1.2 NG/ML (ref 0–4)
CREAT SERPL-MCNC: 0.9 MG/DL (ref 0.5–1.4)
DIFFERENTIAL METHOD: ABNORMAL
EOSINOPHIL # BLD AUTO: 0.2 K/UL (ref 0–0.5)
EOSINOPHIL NFR BLD: 5 % (ref 0–8)
ERYTHROCYTE [DISTWIDTH] IN BLOOD BY AUTOMATED COUNT: 12.5 % (ref 11.5–14.5)
EST. GFR  (NO RACE VARIABLE): >60 ML/MIN/1.73 M^2
ESTIMATED AVG GLUCOSE: 117 MG/DL (ref 68–131)
FERRITIN SERPL-MCNC: 44 NG/ML (ref 20–300)
GLUCOSE SERPL-MCNC: 97 MG/DL (ref 70–110)
HBA1C MFR BLD: 5.7 % (ref 4–5.6)
HCT VFR BLD AUTO: 44.7 % (ref 40–54)
HDLC SERPL-MCNC: 43 MG/DL (ref 40–75)
HDLC SERPL: 29.7 % (ref 20–50)
HGB BLD-MCNC: 14.5 G/DL (ref 14–18)
IMM GRANULOCYTES # BLD AUTO: 0 K/UL (ref 0–0.04)
IMM GRANULOCYTES NFR BLD AUTO: 0 % (ref 0–0.5)
IRON SERPL-MCNC: 146 UG/DL (ref 45–160)
LDLC SERPL CALC-MCNC: 68.4 MG/DL (ref 63–159)
LYMPHOCYTES # BLD AUTO: 1.4 K/UL (ref 1–4.8)
LYMPHOCYTES NFR BLD: 36 % (ref 18–48)
MAGNESIUM SERPL-MCNC: 1.9 MG/DL (ref 1.6–2.6)
MCH RBC QN AUTO: 31 PG (ref 27–31)
MCHC RBC AUTO-ENTMCNC: 32.4 G/DL (ref 32–36)
MCV RBC AUTO: 96 FL (ref 82–98)
MONOCYTES # BLD AUTO: 0.3 K/UL (ref 0.3–1)
MONOCYTES NFR BLD: 7 % (ref 4–15)
NEUTROPHILS # BLD AUTO: 2 K/UL (ref 1.8–7.7)
NEUTROPHILS NFR BLD: 51.5 % (ref 38–73)
NONHDLC SERPL-MCNC: 102 MG/DL
NRBC BLD-RTO: 0 /100 WBC
PLATELET # BLD AUTO: 156 K/UL (ref 150–450)
PMV BLD AUTO: 11.3 FL (ref 9.2–12.9)
POTASSIUM SERPL-SCNC: 4.6 MMOL/L (ref 3.5–5.1)
PROT SERPL-MCNC: 6.6 G/DL (ref 6–8.4)
RBC # BLD AUTO: 4.68 M/UL (ref 4.6–6.2)
SATURATED IRON: 37 % (ref 20–50)
SODIUM SERPL-SCNC: 142 MMOL/L (ref 136–145)
TESTOST SERPL-MCNC: 151 NG/DL (ref 304–1227)
TOTAL IRON BINDING CAPACITY: 400 UG/DL (ref 250–450)
TRANSFERRIN SERPL-MCNC: 270 MG/DL (ref 200–375)
TRIGL SERPL-MCNC: 168 MG/DL (ref 30–150)
TSH SERPL DL<=0.005 MIU/L-ACNC: 3.15 UIU/ML (ref 0.4–4)
WBC # BLD AUTO: 3.83 K/UL (ref 3.9–12.7)

## 2022-12-27 PROCEDURE — 84443 ASSAY THYROID STIM HORMONE: CPT | Performed by: INTERNAL MEDICINE

## 2022-12-27 PROCEDURE — 83735 ASSAY OF MAGNESIUM: CPT | Performed by: INTERNAL MEDICINE

## 2022-12-27 PROCEDURE — 84466 ASSAY OF TRANSFERRIN: CPT | Performed by: INTERNAL MEDICINE

## 2022-12-27 PROCEDURE — 84402 ASSAY OF FREE TESTOSTERONE: CPT | Performed by: INTERNAL MEDICINE

## 2022-12-27 PROCEDURE — 84403 ASSAY OF TOTAL TESTOSTERONE: CPT | Performed by: INTERNAL MEDICINE

## 2022-12-27 PROCEDURE — 85025 COMPLETE CBC W/AUTO DIFF WBC: CPT | Performed by: INTERNAL MEDICINE

## 2022-12-27 PROCEDURE — 84153 ASSAY OF PSA TOTAL: CPT | Performed by: INTERNAL MEDICINE

## 2022-12-27 PROCEDURE — 80061 LIPID PANEL: CPT | Performed by: INTERNAL MEDICINE

## 2022-12-27 PROCEDURE — 80053 COMPREHEN METABOLIC PANEL: CPT | Performed by: INTERNAL MEDICINE

## 2022-12-27 PROCEDURE — 82306 VITAMIN D 25 HYDROXY: CPT | Performed by: INTERNAL MEDICINE

## 2022-12-27 PROCEDURE — 36415 COLL VENOUS BLD VENIPUNCTURE: CPT | Mod: PO | Performed by: INTERNAL MEDICINE

## 2022-12-27 PROCEDURE — 82728 ASSAY OF FERRITIN: CPT | Performed by: INTERNAL MEDICINE

## 2022-12-27 PROCEDURE — 83036 HEMOGLOBIN GLYCOSYLATED A1C: CPT | Performed by: INTERNAL MEDICINE

## 2022-12-30 LAB — TESTOST FREE SERPL-MCNC: 2.1 PG/ML

## 2023-04-13 ENCOUNTER — LAB VISIT (OUTPATIENT)
Dept: LAB | Facility: HOSPITAL | Age: 68
End: 2023-04-13
Attending: FAMILY MEDICINE
Payer: COMMERCIAL

## 2023-04-13 DIAGNOSIS — Z12.5 PROSTATE CANCER SCREENING: ICD-10-CM

## 2023-04-13 DIAGNOSIS — I10 HYPERTENSION, UNSPECIFIED TYPE: ICD-10-CM

## 2023-04-13 LAB
ALBUMIN SERPL BCP-MCNC: 3.9 G/DL (ref 3.5–5.2)
ALP SERPL-CCNC: 73 U/L (ref 55–135)
ALT SERPL W/O P-5'-P-CCNC: 20 U/L (ref 10–44)
ANION GAP SERPL CALC-SCNC: 8 MMOL/L (ref 8–16)
AST SERPL-CCNC: 19 U/L (ref 10–40)
BASOPHILS # BLD AUTO: 0.04 K/UL (ref 0–0.2)
BASOPHILS NFR BLD: 0.9 % (ref 0–1.9)
BILIRUB SERPL-MCNC: 0.6 MG/DL (ref 0.1–1)
BUN SERPL-MCNC: 12 MG/DL (ref 8–23)
CALCIUM SERPL-MCNC: 9.1 MG/DL (ref 8.7–10.5)
CHLORIDE SERPL-SCNC: 102 MMOL/L (ref 95–110)
CHOLEST SERPL-MCNC: 144 MG/DL (ref 120–199)
CHOLEST/HDLC SERPL: 4 {RATIO} (ref 2–5)
CO2 SERPL-SCNC: 27 MMOL/L (ref 23–29)
COMPLEXED PSA SERPL-MCNC: 1.8 NG/ML (ref 0–4)
CREAT SERPL-MCNC: 0.9 MG/DL (ref 0.5–1.4)
DIFFERENTIAL METHOD: NORMAL
EOSINOPHIL # BLD AUTO: 0.2 K/UL (ref 0–0.5)
EOSINOPHIL NFR BLD: 5.2 % (ref 0–8)
ERYTHROCYTE [DISTWIDTH] IN BLOOD BY AUTOMATED COUNT: 12.6 % (ref 11.5–14.5)
EST. GFR  (NO RACE VARIABLE): >60 ML/MIN/1.73 M^2
GLUCOSE SERPL-MCNC: 96 MG/DL (ref 70–110)
HCT VFR BLD AUTO: 45 % (ref 40–54)
HDLC SERPL-MCNC: 36 MG/DL (ref 40–75)
HDLC SERPL: 25 % (ref 20–50)
HGB BLD-MCNC: 14.5 G/DL (ref 14–18)
IMM GRANULOCYTES # BLD AUTO: 0.01 K/UL (ref 0–0.04)
IMM GRANULOCYTES NFR BLD AUTO: 0.2 % (ref 0–0.5)
LDLC SERPL CALC-MCNC: 85.4 MG/DL (ref 63–159)
LYMPHOCYTES # BLD AUTO: 1.2 K/UL (ref 1–4.8)
LYMPHOCYTES NFR BLD: 26.9 % (ref 18–48)
MCH RBC QN AUTO: 30.4 PG (ref 27–31)
MCHC RBC AUTO-ENTMCNC: 32.2 G/DL (ref 32–36)
MCV RBC AUTO: 94 FL (ref 82–98)
MONOCYTES # BLD AUTO: 0.4 K/UL (ref 0.3–1)
MONOCYTES NFR BLD: 7.6 % (ref 4–15)
NEUTROPHILS # BLD AUTO: 2.7 K/UL (ref 1.8–7.7)
NEUTROPHILS NFR BLD: 59.2 % (ref 38–73)
NONHDLC SERPL-MCNC: 108 MG/DL
NRBC BLD-RTO: 0 /100 WBC
PLATELET # BLD AUTO: 178 K/UL (ref 150–450)
PMV BLD AUTO: 11.5 FL (ref 9.2–12.9)
POTASSIUM SERPL-SCNC: 4.6 MMOL/L (ref 3.5–5.1)
PROT SERPL-MCNC: 6.7 G/DL (ref 6–8.4)
RBC # BLD AUTO: 4.77 M/UL (ref 4.6–6.2)
SODIUM SERPL-SCNC: 137 MMOL/L (ref 136–145)
TRIGL SERPL-MCNC: 113 MG/DL (ref 30–150)
WBC # BLD AUTO: 4.58 K/UL (ref 3.9–12.7)

## 2023-04-13 PROCEDURE — 36415 COLL VENOUS BLD VENIPUNCTURE: CPT | Mod: PO | Performed by: FAMILY MEDICINE

## 2023-04-13 PROCEDURE — 84153 ASSAY OF PSA TOTAL: CPT | Performed by: FAMILY MEDICINE

## 2023-04-13 PROCEDURE — 80061 LIPID PANEL: CPT | Performed by: FAMILY MEDICINE

## 2023-04-13 PROCEDURE — 80053 COMPREHEN METABOLIC PANEL: CPT | Performed by: FAMILY MEDICINE

## 2023-04-13 PROCEDURE — 85025 COMPLETE CBC W/AUTO DIFF WBC: CPT | Performed by: FAMILY MEDICINE

## 2023-04-17 ENCOUNTER — OFFICE VISIT (OUTPATIENT)
Dept: FAMILY MEDICINE | Facility: CLINIC | Age: 68
End: 2023-04-17
Payer: COMMERCIAL

## 2023-04-17 VITALS
TEMPERATURE: 98 F | SYSTOLIC BLOOD PRESSURE: 136 MMHG | HEIGHT: 71 IN | OXYGEN SATURATION: 97 % | DIASTOLIC BLOOD PRESSURE: 80 MMHG | WEIGHT: 258.63 LBS | BODY MASS INDEX: 36.21 KG/M2 | HEART RATE: 71 BPM | RESPIRATION RATE: 18 BRPM

## 2023-04-17 DIAGNOSIS — Z00.00 ROUTINE MEDICAL EXAM: Primary | ICD-10-CM

## 2023-04-17 DIAGNOSIS — E66.01 SEVERE OBESITY (BMI 35.0-39.9) WITH COMORBIDITY: ICD-10-CM

## 2023-04-17 DIAGNOSIS — R31.9 HEMATURIA, UNSPECIFIED TYPE: ICD-10-CM

## 2023-04-17 DIAGNOSIS — I10 HYPERTENSION, UNSPECIFIED TYPE: ICD-10-CM

## 2023-04-17 DIAGNOSIS — Z13.6 ENCOUNTER FOR ABDOMINAL AORTIC ANEURYSM (AAA) SCREENING: ICD-10-CM

## 2023-04-17 DIAGNOSIS — E34.9 TESTOSTERONE DEFICIENCY: ICD-10-CM

## 2023-04-17 PROCEDURE — 3008F BODY MASS INDEX DOCD: CPT | Mod: CPTII,S$GLB,, | Performed by: FAMILY MEDICINE

## 2023-04-17 PROCEDURE — 99397 PR PREVENTIVE VISIT,EST,65 & OVER: ICD-10-PCS | Mod: 25,S$GLB,, | Performed by: FAMILY MEDICINE

## 2023-04-17 PROCEDURE — 99999 PR PBB SHADOW E&M-EST. PATIENT-LVL V: ICD-10-PCS | Mod: PBBFAC,,, | Performed by: FAMILY MEDICINE

## 2023-04-17 PROCEDURE — 99397 PER PM REEVAL EST PAT 65+ YR: CPT | Mod: 25,S$GLB,, | Performed by: FAMILY MEDICINE

## 2023-04-17 PROCEDURE — 1159F MED LIST DOCD IN RCRD: CPT | Mod: CPTII,S$GLB,, | Performed by: FAMILY MEDICINE

## 2023-04-17 PROCEDURE — 3079F PR MOST RECENT DIASTOLIC BLOOD PRESSURE 80-89 MM HG: ICD-10-PCS | Mod: CPTII,S$GLB,, | Performed by: FAMILY MEDICINE

## 2023-04-17 PROCEDURE — 1159F PR MEDICATION LIST DOCUMENTED IN MEDICAL RECORD: ICD-10-PCS | Mod: CPTII,S$GLB,, | Performed by: FAMILY MEDICINE

## 2023-04-17 PROCEDURE — 99999 PR PBB SHADOW E&M-EST. PATIENT-LVL V: CPT | Mod: PBBFAC,,, | Performed by: FAMILY MEDICINE

## 2023-04-17 PROCEDURE — 3288F PR FALLS RISK ASSESSMENT DOCUMENTED: ICD-10-PCS | Mod: CPTII,S$GLB,, | Performed by: FAMILY MEDICINE

## 2023-04-17 PROCEDURE — 1126F AMNT PAIN NOTED NONE PRSNT: CPT | Mod: CPTII,S$GLB,, | Performed by: FAMILY MEDICINE

## 2023-04-17 PROCEDURE — 3075F PR MOST RECENT SYSTOLIC BLOOD PRESS GE 130-139MM HG: ICD-10-PCS | Mod: CPTII,S$GLB,, | Performed by: FAMILY MEDICINE

## 2023-04-17 PROCEDURE — 90471 IMMUNIZATION ADMIN: CPT | Mod: S$GLB,,, | Performed by: FAMILY MEDICINE

## 2023-04-17 PROCEDURE — 90677 PNEUMOCOCCAL CONJUGATE VACCINE 20-VALENT: ICD-10-PCS | Mod: S$GLB,,, | Performed by: FAMILY MEDICINE

## 2023-04-17 PROCEDURE — 1126F PR PAIN SEVERITY QUANTIFIED, NO PAIN PRESENT: ICD-10-PCS | Mod: CPTII,S$GLB,, | Performed by: FAMILY MEDICINE

## 2023-04-17 PROCEDURE — 3075F SYST BP GE 130 - 139MM HG: CPT | Mod: CPTII,S$GLB,, | Performed by: FAMILY MEDICINE

## 2023-04-17 PROCEDURE — 1101F PR PT FALLS ASSESS DOC 0-1 FALLS W/OUT INJ PAST YR: ICD-10-PCS | Mod: CPTII,S$GLB,, | Performed by: FAMILY MEDICINE

## 2023-04-17 PROCEDURE — 3008F PR BODY MASS INDEX (BMI) DOCUMENTED: ICD-10-PCS | Mod: CPTII,S$GLB,, | Performed by: FAMILY MEDICINE

## 2023-04-17 PROCEDURE — 90471 PNEUMOCOCCAL CONJUGATE VACCINE 20-VALENT: ICD-10-PCS | Mod: S$GLB,,, | Performed by: FAMILY MEDICINE

## 2023-04-17 PROCEDURE — 1101F PT FALLS ASSESS-DOCD LE1/YR: CPT | Mod: CPTII,S$GLB,, | Performed by: FAMILY MEDICINE

## 2023-04-17 PROCEDURE — 3079F DIAST BP 80-89 MM HG: CPT | Mod: CPTII,S$GLB,, | Performed by: FAMILY MEDICINE

## 2023-04-17 PROCEDURE — 3288F FALL RISK ASSESSMENT DOCD: CPT | Mod: CPTII,S$GLB,, | Performed by: FAMILY MEDICINE

## 2023-04-17 PROCEDURE — 90677 PCV20 VACCINE IM: CPT | Mod: S$GLB,,, | Performed by: FAMILY MEDICINE

## 2023-04-17 RX ORDER — TESTOSTERONE CYPIONATE 200 MG/ML
INJECTION, SOLUTION INTRAMUSCULAR
Qty: 10 ML | Refills: 5
Start: 2023-04-17

## 2023-04-17 RX ORDER — GABAPENTIN 600 MG/1
600 TABLET ORAL 2 TIMES DAILY
COMMUNITY
Start: 2023-03-28

## 2023-04-17 NOTE — PROGRESS NOTES
Subjective:       Patient ID: Tex Pickard III is a 67 y.o. male.    Chief Complaint: Follow-up (Passed some blood in urine last week and is being seen by urology/)    HPI    Here for a check up    Reviewed recent labs. All wnl.      htn stable.      Sees Dr. Shipman, cardiologist, for htn.  Stable. Also, receives testosterone injections from Dr. Shipman.  Has been on 200 mg/ml 0.8 mg weekly.     Last week, had hematuria.  Seeing a urologist, Dr. Owens.  Scheduled for a cystoscope next week.       Review of Systems      Review of Systems   Constitutional: Negative for fever and chills.   HENT: Negative for hearing loss and neck stiffness.    Eyes: Negative for redness and itching.   Respiratory: Negative for cough and choking.    Cardiovascular: Negative for chest pain and leg swelling.  Abdomen: Negative for abdominal pain and blood in stool.   Genitourinary: Negative for dysuria and flank pain.   Musculoskeletal: Negative for back pain and gait problem.   Neurological: Negative for light-headedness and headaches.   Hematological: Negative for adenopathy.   Psychiatric/Behavioral: Negative for behavioral problems.     Objective:      Physical Exam  HENT:      Head: Atraumatic.   Eyes:      Conjunctiva/sclera: Conjunctivae normal.      Pupils: Pupils are equal, round, and reactive to light.   Cardiovascular:      Rate and Rhythm: Normal rate and regular rhythm.      Heart sounds: No murmur heard.  Pulmonary:      Effort: Pulmonary effort is normal.      Breath sounds: Normal breath sounds. No wheezing.   Musculoskeletal:      Cervical back: Normal range of motion.   Lymphadenopathy:      Cervical: No cervical adenopathy.       Assessment:       1. Routine medical exam    2. Hypertension, unspecified type    3. Testosterone deficiency    4. Hematuria, unspecified type    5. Encounter for abdominal aortic aneurysm (AAA) screening    6. Severe obesity (BMI 35.0-39.9) with comorbidity        Plan:       Routine medical  exam    Hypertension, unspecified type    Testosterone deficiency  -     testosterone cypionate (DEPOTESTOTERONE CYPIONATE) 200 mg/mL injection; 0.8 ml injection weekly  Dispense: 10 mL; Refill: 5  -     Testosterone; Future; Expected date: 04/17/2023    Hematuria, unspecified type    Encounter for abdominal aortic aneurysm (AAA) screening  -     US Abdominal Aorta; Future; Expected date: 04/17/2023    Severe obesity (BMI 35.0-39.9) with comorbidity    Other orders  -     (In Office Administered) Pneumococcal Conjugate Vaccine (20 Valent) (IM)          Plan:  See orders-Aorta us and testosterone on a Friday morning 2-4 weeks from now  Prevnar today  Cont current meds  F/u in 1 year        Medication List with Changes/Refills   Current Medications    ASPIRIN 81 MG CHEW    Take 81 mg by mouth once daily.    BYSTOLIC 20 MG TAB    20 mg.     CELECOXIB (CELEBREX) 200 MG CAPSULE    Take 200 mg by mouth once daily.    CHOLECALCIFEROL, VITAMIN D3, 125 MCG (5,000 UNIT) CAPSULE    Take 1 capsule (5,000 Units total) by mouth twice a week.    CYANOCOBALAMIN, VITAMIN B-12, (VITAMIN B-12) 2,500 MCG SUBL    Place 2,500 mcg under the tongue every other day.    FINASTERIDE (PROPECIA) 1 MG TABLET    Take 1 mg by mouth once daily.    GABAPENTIN (NEURONTIN) 600 MG TABLET    Take 600 mg by mouth 2 (two) times daily.    GLUC LADD/CHONDRO LADD A/VIT C/MN (GLUCOSAMINE 1500 COMPLEX ORAL)    Take by mouth.    LOSARTAN (COZAAR) 25 MG TABLET        MULTIVITAMIN CAPSULE    Take 1 capsule by mouth once daily.    OMEPRAZOLE (PRILOSEC) 40 MG CAPSULE    Take 1 capsule (40 mg total) by mouth once daily.    ONDANSETRON (ZOFRAN-ODT) 8 MG TBDL    Take 1 tablet (8 mg total) by mouth 3 (three) times daily.    OXYMETAZOLINE (AFRIN) 0.05 % NASAL SPRAY    2 sprays by Nasal route nightly as needed.     PRAVASTATIN (PRAVACHOL) 10 MG TABLET    Take 10 mg by mouth once daily.     SLOW RELEASE IRON 144 MG (45 MG IRON) TBSR    Take 1 tablet with vitamin C 250 mg  daily   Changed and/or Refilled Medications    Modified Medication Previous Medication    TESTOSTERONE CYPIONATE (DEPOTESTOTERONE CYPIONATE) 200 MG/ML INJECTION testosterone cypionate (DEPOTESTOTERONE CYPIONATE) 200 mg/mL injection       0.8 ml injection weekly    Inject 0.5 mLs (100 mg total) into the muscle every 14 (fourteen) days.

## 2023-05-05 ENCOUNTER — HOSPITAL ENCOUNTER (OUTPATIENT)
Dept: RADIOLOGY | Facility: HOSPITAL | Age: 68
Discharge: HOME | End: 2023-05-05
Attending: FAMILY MEDICINE
Payer: COMMERCIAL

## 2023-05-05 DIAGNOSIS — Z13.6 ENCOUNTER FOR ABDOMINAL AORTIC ANEURYSM (AAA) SCREENING: ICD-10-CM

## 2023-05-05 PROCEDURE — 76775 US EXAM ABDO BACK WALL LIM: CPT | Mod: TC,PO

## 2023-05-05 PROCEDURE — 76775 US ABDOMINAL AORTA: ICD-10-PCS | Mod: 26,,, | Performed by: RADIOLOGY

## 2023-05-05 PROCEDURE — 76775 US EXAM ABDO BACK WALL LIM: CPT | Mod: 26,,, | Performed by: RADIOLOGY

## 2023-08-09 ENCOUNTER — PATIENT MESSAGE (OUTPATIENT)
Dept: ADMINISTRATIVE | Facility: HOSPITAL | Age: 68
End: 2023-08-09
Payer: COMMERCIAL

## 2023-08-24 NOTE — TRANSFER OF CARE
"Anesthesia Transfer of Care Note    Patient: Tex Pickard III    Procedure(s) Performed: Procedure(s) (LRB):  COLONOSCOPY (N/A)    Patient location: PACU    Anesthesia Type: general    Transport from OR: Transported from OR on room air with adequate spontaneous ventilation    Post pain: adequate analgesia    Post assessment: no apparent anesthetic complications    Post vital signs: stable    Level of consciousness: awake and alert    Nausea/Vomiting: no nausea/vomiting    Complications: none    Transfer of care protocol was followed      Last vitals:   Visit Vitals  /67 (BP Location: Right arm, Patient Position: Lying)   Pulse (!) 54   Temp 36.5 °C (97.7 °F) (Skin)   Resp 16   Ht 5' 11" (1.803 m)   Wt 111.1 kg (245 lb)   SpO2 96%   BMI 34.17 kg/m²     "
no

## 2023-08-28 ENCOUNTER — PATIENT OUTREACH (OUTPATIENT)
Dept: ADMINISTRATIVE | Facility: HOSPITAL | Age: 68
End: 2023-08-28
Payer: COMMERCIAL

## 2023-08-28 NOTE — PROGRESS NOTES
Population Health Chart Review & Patient Outreach Details:     Reason for Outreach Encounter:     []  Non-Compliant Report   []  Payor Report ([employer], PHN, BCBS, MSSP, MCIP, UHC, etc.)   [x]   Chart Review     Updates Requested / Reviewed:     [x]  Care Everywhere    [x]     [x]  External Sources ([facility], [facility], DIS, etc.)   []  Care Team Updated    Patient Outreach Method:    []  Telephone Outreach Completed   [] Successful   [] Left Voicemail   [] Unable to Contact (wrong number, no voicemail)  []  [facility] Outreach Sent  []  Letter Outreach Mailed  []  Fax Sent for External Records  []  External Records Upload    Health Maintenance Topics Addressed and Outreach Outcomes / Actions Taken:        []      Breast Cancer Screening []  Mammo Scheduled      []  External Records Requested     []  Added Reminder to Complete to Upcoming Primary Care Appt Notes     []  Patient Declined     []  Patient Will Call Back to Schedule     []  Patient Will Schedule with External Provider / Order Routed if Applicable             []       Cervical Cancer Screening []  Pap Scheduled      []  External Records Requested     []  Added Reminder to Complete to Upcoming Primary Care Appt Notes     []  Patient Declined     []  Patient Will Call Back to Schedule     []  Patient Will Schedule with External Provider               []          Colorectal Cancer Screening []  Colonoscopy Case Request or Referral Placed     []  External Records Requested     []  Added Reminder to Complete to Upcoming Primary Care Appt Notes     []  Patient Declined     []  Patient Will Call Back to Schedule     []  Patient Will Schedule with External Provider     []  Fit Kit Mailed (add the SmartPhrase under additional notes)     []  Reminded Patient to Complete Home Test             []      Diabetic Eye Exam []  Eye Camera Scheduled or Optometry Referral Placed     []  External Records Requested     []  Added Reminder to Complete to Upcoming  Primary Care Appt Notes     []  Patient Declined     []  Patient Will Call Back to Schedule     []  Patient Will Schedule with External Provider             []      Blood Pressure Control []  Primary Care Follow Up Visit Scheduled     []  Remote Blood Pressure Reading Captured     []  Added Reminder to Complete to Upcoming Primary Care Appt Notes     []  Patient Declined     []  Patient Will Call Back / Patient Will Send Portal Message with Reading     []  Patient Will Call Back to Schedule Provider Visit             []       HbA1c & Other Labs []  Lab Appt Scheduled for Due Labs     []  Primary Care Follow Up Visit Scheduled      []  Reminded Patient to Complete Home Test     []  Added Reminder to Complete to Upcoming Primary Care Appt Notes     []  Patient Declined     []  Patient Will Call Back to Schedule     []  Patient Will Schedule with External Provider / Order Routed if Applicable           [x]    Schedule Primary Care Appt [x]  Primary Care Appt Scheduled     []  Patient Declined     []  Patient Will Call Back to Schedule     []  Pt Established with External Provider & Updated Care Team             []      Medication Adherence []  Primary Care Appointment Scheduled     []  Added Reminder to Upcoming Primary Care Appt Notes     []  Patient Reminded to  Prescription     []  Patient Declined, Provider Notified if Needed     []  Sent Provider Message to Review and/or Add Exclusion to Problem List             []      Osteoporosis Screening []  DXA Appointment Scheduled     []  External Records Requested     []  Added Reminder to Complete to Upcoming Primary Care Appt Notes     []  Patient Declined     []  Patient Will Call Back to Schedule     []  Patient Will Schedule with External Provider / Order Routed if Applicable     Additional Care Coordinator Notes:         Further Action Needed If Patient Returns Outreach:

## 2024-04-09 ENCOUNTER — LAB VISIT (OUTPATIENT)
Dept: LAB | Facility: HOSPITAL | Age: 69
End: 2024-04-09
Attending: INTERNAL MEDICINE
Payer: COMMERCIAL

## 2024-04-09 DIAGNOSIS — I35.1 AORTIC INCOMPETENCE: ICD-10-CM

## 2024-04-09 DIAGNOSIS — I25.10 CORONARY ATHEROSCLEROSIS OF NATIVE CORONARY ARTERY: Primary | ICD-10-CM

## 2024-04-09 DIAGNOSIS — M54.2 CERVICALGIA: ICD-10-CM

## 2024-04-09 DIAGNOSIS — Z79.899 ENCOUNTER FOR LONG-TERM (CURRENT) USE OF OTHER MEDICATIONS: ICD-10-CM

## 2024-04-09 DIAGNOSIS — M54.89 VERTEBROGENIC PAIN SYNDROME: ICD-10-CM

## 2024-04-09 DIAGNOSIS — E78.2 MIXED HYPERLIPIDEMIA: ICD-10-CM

## 2024-04-09 DIAGNOSIS — I51.9 HEART DISEASE, UNSPECIFIED: ICD-10-CM

## 2024-04-09 DIAGNOSIS — K76.0 FATTY METAMORPHOSIS OF LIVER: ICD-10-CM

## 2024-04-09 LAB
25(OH)D3+25(OH)D2 SERPL-MCNC: 32 NG/ML (ref 30–96)
ALBUMIN SERPL BCP-MCNC: 4 G/DL (ref 3.5–5.2)
ALP SERPL-CCNC: 62 U/L (ref 55–135)
ALT SERPL W/O P-5'-P-CCNC: 63 U/L (ref 10–44)
ANION GAP SERPL CALC-SCNC: 10 MMOL/L (ref 8–16)
AST SERPL-CCNC: 33 U/L (ref 10–40)
BASOPHILS # BLD AUTO: 0.03 K/UL (ref 0–0.2)
BASOPHILS NFR BLD: 0.7 % (ref 0–1.9)
BILIRUB SERPL-MCNC: 0.6 MG/DL (ref 0.1–1)
BILIRUB UR QL STRIP: NEGATIVE
BUN SERPL-MCNC: 15 MG/DL (ref 8–23)
CALCIUM SERPL-MCNC: 10 MG/DL (ref 8.7–10.5)
CHLORIDE SERPL-SCNC: 104 MMOL/L (ref 95–110)
CHOLEST SERPL-MCNC: 217 MG/DL (ref 120–199)
CHOLEST/HDLC SERPL: 3.9 {RATIO} (ref 2–5)
CLARITY UR: CLEAR
CO2 SERPL-SCNC: 27 MMOL/L (ref 23–29)
COLOR UR: YELLOW
COMPLEXED PSA SERPL-MCNC: 0.76 NG/ML (ref 0–4)
CREAT SERPL-MCNC: 0.9 MG/DL (ref 0.5–1.4)
DIFFERENTIAL METHOD BLD: ABNORMAL
EOSINOPHIL # BLD AUTO: 0.2 K/UL (ref 0–0.5)
EOSINOPHIL NFR BLD: 3.7 % (ref 0–8)
ERYTHROCYTE [DISTWIDTH] IN BLOOD BY AUTOMATED COUNT: 13.3 % (ref 11.5–14.5)
EST. GFR  (NO RACE VARIABLE): >60 ML/MIN/1.73 M^2
ESTIMATED AVG GLUCOSE: 140 MG/DL (ref 68–131)
FERRITIN SERPL-MCNC: 133 NG/ML (ref 20–300)
GLUCOSE SERPL-MCNC: 98 MG/DL (ref 70–110)
GLUCOSE UR QL STRIP: NEGATIVE
HBA1C MFR BLD: 6.5 % (ref 4–5.6)
HCT VFR BLD AUTO: 43.2 % (ref 40–54)
HDLC SERPL-MCNC: 55 MG/DL (ref 40–75)
HDLC SERPL: 25.3 % (ref 20–50)
HGB BLD-MCNC: 14.1 G/DL (ref 14–18)
HGB UR QL STRIP: NEGATIVE
IMM GRANULOCYTES # BLD AUTO: 0.02 K/UL (ref 0–0.04)
IMM GRANULOCYTES NFR BLD AUTO: 0.4 % (ref 0–0.5)
IRON SERPL-MCNC: 116 UG/DL (ref 45–160)
KETONES UR QL STRIP: NEGATIVE
LDLC SERPL CALC-MCNC: 122.6 MG/DL (ref 63–159)
LEUKOCYTE ESTERASE UR QL STRIP: NEGATIVE
LYMPHOCYTES # BLD AUTO: 1.3 K/UL (ref 1–4.8)
LYMPHOCYTES NFR BLD: 27.5 % (ref 18–48)
MAGNESIUM SERPL-MCNC: 2 MG/DL (ref 1.6–2.6)
MCH RBC QN AUTO: 30.6 PG (ref 27–31)
MCHC RBC AUTO-ENTMCNC: 32.6 G/DL (ref 32–36)
MCV RBC AUTO: 94 FL (ref 82–98)
MONOCYTES # BLD AUTO: 0.4 K/UL (ref 0.3–1)
MONOCYTES NFR BLD: 7.9 % (ref 4–15)
NEUTROPHILS # BLD AUTO: 2.7 K/UL (ref 1.8–7.7)
NEUTROPHILS NFR BLD: 59.8 % (ref 38–73)
NITRITE UR QL STRIP: NEGATIVE
NONHDLC SERPL-MCNC: 162 MG/DL
NRBC BLD-RTO: 0 /100 WBC
PH UR STRIP: 7 [PH] (ref 5–8)
PLATELET # BLD AUTO: 146 K/UL (ref 150–450)
PMV BLD AUTO: 11.2 FL (ref 9.2–12.9)
POTASSIUM SERPL-SCNC: 4.9 MMOL/L (ref 3.5–5.1)
PROT SERPL-MCNC: 7.2 G/DL (ref 6–8.4)
PROT UR QL STRIP: NEGATIVE
RBC # BLD AUTO: 4.61 M/UL (ref 4.6–6.2)
SATURATED IRON: 30 % (ref 20–50)
SODIUM SERPL-SCNC: 141 MMOL/L (ref 136–145)
SP GR UR STRIP: 1.02 (ref 1–1.03)
TOTAL IRON BINDING CAPACITY: 382 UG/DL (ref 250–450)
TRANSFERRIN SERPL-MCNC: 258 MG/DL (ref 200–375)
TRIGL SERPL-MCNC: 197 MG/DL (ref 30–150)
TSH SERPL DL<=0.005 MIU/L-ACNC: 2.64 UIU/ML (ref 0.4–4)
URN SPEC COLLECT METH UR: NORMAL
WBC # BLD AUTO: 4.54 K/UL (ref 3.9–12.7)

## 2024-04-09 PROCEDURE — 81003 URINALYSIS AUTO W/O SCOPE: CPT | Mod: PO | Performed by: INTERNAL MEDICINE

## 2024-04-09 PROCEDURE — 83540 ASSAY OF IRON: CPT | Performed by: INTERNAL MEDICINE

## 2024-04-09 PROCEDURE — 84153 ASSAY OF PSA TOTAL: CPT | Performed by: INTERNAL MEDICINE

## 2024-04-09 PROCEDURE — 80053 COMPREHEN METABOLIC PANEL: CPT | Performed by: INTERNAL MEDICINE

## 2024-04-09 PROCEDURE — 80061 LIPID PANEL: CPT | Performed by: INTERNAL MEDICINE

## 2024-04-09 PROCEDURE — 36415 COLL VENOUS BLD VENIPUNCTURE: CPT | Mod: PO | Performed by: INTERNAL MEDICINE

## 2024-04-09 PROCEDURE — 82306 VITAMIN D 25 HYDROXY: CPT | Performed by: INTERNAL MEDICINE

## 2024-04-09 PROCEDURE — 83735 ASSAY OF MAGNESIUM: CPT | Performed by: INTERNAL MEDICINE

## 2024-04-09 PROCEDURE — 85025 COMPLETE CBC W/AUTO DIFF WBC: CPT | Performed by: INTERNAL MEDICINE

## 2024-04-09 PROCEDURE — 84443 ASSAY THYROID STIM HORMONE: CPT | Performed by: INTERNAL MEDICINE

## 2024-04-09 PROCEDURE — 83036 HEMOGLOBIN GLYCOSYLATED A1C: CPT | Performed by: INTERNAL MEDICINE

## 2024-04-09 PROCEDURE — 82728 ASSAY OF FERRITIN: CPT | Performed by: INTERNAL MEDICINE

## 2024-04-24 ENCOUNTER — OFFICE VISIT (OUTPATIENT)
Dept: PHYSICAL MEDICINE AND REHAB | Facility: CLINIC | Age: 69
End: 2024-04-24
Payer: COMMERCIAL

## 2024-04-24 VITALS — HEIGHT: 71 IN | WEIGHT: 258 LBS | BODY MASS INDEX: 36.12 KG/M2

## 2024-04-24 DIAGNOSIS — M54.2 MYOFASCIAL NECK PAIN: ICD-10-CM

## 2024-04-24 DIAGNOSIS — M47.812 CERVICAL FACET SYNDROME: Primary | ICD-10-CM

## 2024-04-24 PROCEDURE — 4010F ACE/ARB THERAPY RXD/TAKEN: CPT | Mod: CPTII,S$GLB,, | Performed by: STUDENT IN AN ORGANIZED HEALTH CARE EDUCATION/TRAINING PROGRAM

## 2024-04-24 PROCEDURE — 1101F PT FALLS ASSESS-DOCD LE1/YR: CPT | Mod: CPTII,S$GLB,, | Performed by: STUDENT IN AN ORGANIZED HEALTH CARE EDUCATION/TRAINING PROGRAM

## 2024-04-24 PROCEDURE — 1159F MED LIST DOCD IN RCRD: CPT | Mod: CPTII,S$GLB,, | Performed by: STUDENT IN AN ORGANIZED HEALTH CARE EDUCATION/TRAINING PROGRAM

## 2024-04-24 PROCEDURE — 1125F AMNT PAIN NOTED PAIN PRSNT: CPT | Mod: CPTII,S$GLB,, | Performed by: STUDENT IN AN ORGANIZED HEALTH CARE EDUCATION/TRAINING PROGRAM

## 2024-04-24 PROCEDURE — 3044F HG A1C LEVEL LT 7.0%: CPT | Mod: CPTII,S$GLB,, | Performed by: STUDENT IN AN ORGANIZED HEALTH CARE EDUCATION/TRAINING PROGRAM

## 2024-04-24 PROCEDURE — 3288F FALL RISK ASSESSMENT DOCD: CPT | Mod: CPTII,S$GLB,, | Performed by: STUDENT IN AN ORGANIZED HEALTH CARE EDUCATION/TRAINING PROGRAM

## 2024-04-24 PROCEDURE — 3008F BODY MASS INDEX DOCD: CPT | Mod: CPTII,S$GLB,, | Performed by: STUDENT IN AN ORGANIZED HEALTH CARE EDUCATION/TRAINING PROGRAM

## 2024-04-24 PROCEDURE — 99204 OFFICE O/P NEW MOD 45 MIN: CPT | Mod: S$GLB,,, | Performed by: STUDENT IN AN ORGANIZED HEALTH CARE EDUCATION/TRAINING PROGRAM

## 2024-04-24 PROCEDURE — 99999 PR PBB SHADOW E&M-EST. PATIENT-LVL IV: CPT | Mod: PBBFAC,,, | Performed by: STUDENT IN AN ORGANIZED HEALTH CARE EDUCATION/TRAINING PROGRAM

## 2024-04-24 RX ORDER — METHOCARBAMOL 500 MG/1
500 TABLET, FILM COATED ORAL 4 TIMES DAILY PRN
Qty: 40 TABLET | Refills: 0 | Status: SHIPPED | OUTPATIENT
Start: 2024-04-24 | End: 2024-05-04

## 2024-04-24 RX ORDER — MELOXICAM 15 MG/1
15 TABLET ORAL DAILY
Qty: 30 TABLET | Refills: 2 | Status: SHIPPED | OUTPATIENT
Start: 2024-04-24

## 2024-04-24 NOTE — ASSESSMENT & PLAN NOTE
Discussed the role of injections and elected to hold off for now.    Meloxicam 15 mg daily p.r.n..  He had gastric bypass over 20 years ago.  Robaxin 500 mg 4 times daily p.r.n.  Physical therapy evaluate and treat for facetogenic cervical spine pain and myofascial neck pain.    He has failed rhizotomy of the cervical spine although the specific levels are unknown.    We will try to get his MRI uploaded into our [employer] system.  This is a full-body MRI that he paid for out of [place].    Return to clinic in 6-8 weeks.  If insufficient relief with the above plan, consider trigger point injections after reviewing his MRI.

## 2024-04-24 NOTE — PROGRESS NOTES
[facility] New Patient Evaluation      Referred by: Dr. Jalen Shipman    PCP: Jalen Shipman MD    CC:   Chief Complaint   Patient presents with    Neck Pain        HPI:   Tex Pickard III is a 68 y.o. male who presents with complaints of neck pain. His pain seemed to begin approx 5 years ago without inciting event. The pain in bilateral and radiates into both traps but not there arms. He was a patient of Dr. Gonsalo Cheema. He recalls having a rhizotomy approx 3-4 years ago with little relief of his pain. He denies ever having epidurals for his pain. Pain is worse with flexion and rotation.  His pain is rated 6/10 and describes as a sharp and shooting pain.  Source with flexing.  He also complains of some joint stiffness.  He essentially has to hold his head upright due to his neck pain.    Pain Intervention History:  Rhizotomy of the C spine 3-4 years ago  Rhizotomy of the L spine approx 10 years ago with Dr. Nicolas Salinas    Past Spine Surgical History:  none    Past and current medications:  Antineuropathics: none  NSAIDs: none  Physical therapy: none  Antidepressants: none  Muscle relaxers: none  Opioids: none  Antiplatelets/Anticoagulants: none    History:    Current Outpatient Medications:     aspirin 81 MG Chew, Take 81 mg by mouth once daily., Disp: , Rfl:     BYSTOLIC 20 mg Tab, 20 mg. , Disp: , Rfl:     cholecalciferol, vitamin D3, 125 mcg (5,000 unit) capsule, Take 1 capsule (5,000 Units total) by mouth twice a week., Disp: , Rfl:     cyanocobalamin, vitamin B-12, (VITAMIN B-12) 2,500 mcg Subl, Place 2,500 mcg under the tongue every other day., Disp: , Rfl: 0    finasteride (PROPECIA) 1 mg tablet, Take 1 mg by mouth once daily., Disp: , Rfl: 3    gluc willis/chondro willis A/vit C/Mn (GLUCOSAMINE 1500 COMPLEX ORAL), Take by mouth., Disp: , Rfl:     losartan (COZAAR) 25 MG tablet, , Disp: , Rfl:     multivitamin capsule, Take 1 capsule by mouth once daily., Disp: , Rfl:      omeprazole (PRILOSEC) 40 MG capsule, Take 1 capsule (40 mg total) by mouth once daily., Disp: 30 capsule, Rfl: 11    ondansetron (ZOFRAN-ODT) 8 MG TbDL, Take 1 tablet (8 mg total) by mouth 3 (three) times daily., Disp: 30 tablet, Rfl: 1    oxymetazoline (AFRIN) 0.05 % nasal spray, 2 sprays by Nasal route nightly as needed. , Disp: , Rfl:     pravastatin (PRAVACHOL) 10 MG tablet, Take 10 mg by mouth once daily. , Disp: , Rfl:     SLOW RELEASE IRON 144 mg (45 mg iron) TbSR, Take 1 tablet with vitamin C 250 mg daily, Disp: , Rfl:     testosterone cypionate (DEPOTESTOTERONE CYPIONATE) 200 mg/mL injection, 0.8 ml injection weekly, Disp: 10 mL, Rfl: 5    gabapentin (NEURONTIN) 600 MG tablet, Take 600 mg by mouth 2 (two) times daily., Disp: , Rfl:     meloxicam (MOBIC) 15 MG tablet, Take 1 tablet (15 mg total) by mouth once daily., Disp: 30 tablet, Rfl: 2    methocarbamoL (ROBAXIN) 500 MG Tab, Take 1 tablet (500 mg total) by mouth 4 (four) times daily as needed (neck pain)., Disp: 40 tablet, Rfl: 0  No current facility-administered medications for this visit.    Facility-Administered Medications Ordered in Other Visits:     diphenhydrAMINE injection 25 mg, 25 mg, Intravenous, Q6H PRN, Case Juarez MD    HYDROmorphone injection 0.5 mg, 0.5 mg, Intravenous, Q5 Min PRN, Case Juarez MD, 0.5 mg at 10/26/21 1235    lorazepam injection 0.25 mg, 0.25 mg, Intravenous, Once PRN, Case Juarez MD    ondansetron injection 4 mg, 4 mg, Intravenous, Daily PRN, Case Juarez MD    sodium chloride 0.9% bolus 250 mL, 250 mL, Intravenous, Once, Case Juarez MD    Past Medical History:   Diagnosis Date    Arthritis     Colon polyps     History of colon polyps 4/14/2014    Hypertension     Renal disorder     Sleep apnea     wears CPAP       Past Surgical History:   Procedure Laterality Date    CATARACT EXTRACTION W/  INTRAOCULAR LENS IMPLANT      curt    COLONOSCOPY N/A 10/7/2019    Procedure: COLONOSCOPY;  " Surgeon: To Martinez Jr., MD;  Location: [facility] ENDO;  Service: Endoscopy;  Laterality: N/A;    COLONOSCOPY W/ POLYPECTOMY  9/26/2008  [facility]    One 1 to 2 mm polyp in the hepatic flexure.  TUBULAR ADENOMA.   Redundant colon.   Otherwise, the colon and term ileum is normal.     EYE SURGERY      cataract OU    EYE SURGERY Bilateral     eye lid    GASTRIC BYPASS  1999    LIPOSUCTION  2011    plastic surgery - abdominal dermolipectomy ("tummy tuck")    TRANSURETHRAL SURGICAL REMOVAL OF PROSTATE (TURP) USING GREEN LIGHT LASER N/A 10/26/2021    Procedure: TURP, USING GREEN LIGHT LASER;  Surgeon: Mikhail Owens MD;  Location: [facility] OR;  Service: Urology;  Laterality: N/A;    TRIGGER FINGER RELEASE Right 11/12/2020    Procedure: RELEASE, TRIGGER FINGER;  Surgeon: Mauricio Maravilla MD;  Location: [facility] OR;  Service: Orthopedics;  Laterality: Right;  Procedure:  Right middle finger trigger release    Position:  Supine    Anesthesia:  Local    Equipment:  Basic handset    VASECTOMY         Family History   Problem Relation Name Age of Onset    Diabetes Father      Heart disease Father          CAD stents    Hypertension Mother      Dementia Mother          early 70's       Social History     Socioeconomic History    Marital status:    Occupational History    Occupation: contractor   Tobacco Use    Smoking status: Former     Types: Cigarettes    Smokeless tobacco: Former   Substance and Sexual Activity    Alcohol use: No    Drug use: No     Social Determinants of Health     Financial Resource Strain: Low Risk  (4/17/2023)    Overall Financial Resource Strain (CARDIA)     Difficulty of Paying Living Expenses: Not hard at all   Food Insecurity: No Food Insecurity (4/17/2023)    Hunger Vital Sign     Worried About Running Out of Food in the Last Year: Never true     Ran Out of Food in the Last Year: Never true   Transportation Needs: Patient Declined (4/17/2023)    PRAPARE - Transportation     Lack of Transportation " "(Medical): Patient declined     Lack of Transportation (Non-Medical): Patient declined   Physical Activity: Sufficiently Active (4/17/2023)    Exercise Vital Sign     Days of Exercise per Week: 3 days     Minutes of Exercise per Session: 60 min   Stress: No Stress Concern Present (4/17/2023)    [place] [employer] - Occupational Stress Questionnaire     Feeling of Stress : Not at all   Social Connections: Unknown (4/17/2023)    Social Connection and Isolation Panel [NHANES]     Frequency of Communication with Friends and Family: Three times a week     Frequency of Social Gatherings with Friends and Family: Once a week     Active Member of Clubs or Organizations: Patient declined     Attends Club or Organization Meetings: Patient declined     Marital Status:    Housing Stability: Low Risk  (4/17/2023)    Housing Stability Vital Sign     Unable to Pay for Housing in the Last Year: No     Number of Places Lived in the Last Year: 1     Unstable Housing in the Last Year: No       Review of patient's allergies indicates:   Allergen Reactions    Cefaclor Hives     ceclor    Codeine Hives     Review of Systems:  Balance of review of systems is negative.    Physical Exam:  Vitals:    04/24/24 1330   Weight: 117 kg (258 lb)   Height: 5' 11" (1.803 m)   PainSc:   8     Body mass index is 35.98 kg/m².    Gen: NAD  Psych: mood appropriate for given condition  HEENT: eyes anicteric   CV: RRR, 2+ radial pulse  HEENT: anicteric   Respiratory: non-labored, no signs of respiratory distress  Abd: non-distended  Skin: warm, dry and intact.  Gait: No antalgic gait.     Cervical spine:   ROM is limited in flexion, extension and lateral rotation due to increased pain.  Spurling's maneuver causes no neck pain to either side.  Myofascial exam: Tenderness to palpation across cervical paraspinous, superior trapezius and levator scapulae regions bilaterally.    Sensory:  Intact and symmetrical to light touch in " C4-T1 dermatomes bilaterally.   Motor:    Right Left   C4 Shoulder Abduction  5  5   C5 Elbow Flexion    5  5   C6 Wrist Extension  5  5   C7 Elbow Extension   5  5   C8/T1 Hand Intrinsics   5  5      Right Left   Triceps DTR 2+ 2+   Biceps DTR 2+ 2+   Brachioradialis DTR 2+ 2+   Ryan Absent  Absent     Imaging:  Full body MRI unable to be loaded today.     Labs:  Lab Results   Component Value Date    HGBA1C 6.5 (H) 04/09/2024       Lab Results   Component Value Date    WBC 4.54 04/09/2024    HGB 14.1 04/09/2024    HCT 43.2 04/09/2024    MCV 94 04/09/2024     (L) 04/09/2024     Assessment:   Problem List Items Addressed This Visit          Orthopedic    Myofascial neck pain     Discussed the role of injections and elected to hold off for now.    Meloxicam 15 mg daily p.r.n..  He had gastric bypass over 20 years ago.  Robaxin 500 mg 4 times daily p.r.n.  Physical therapy evaluate and treat for facetogenic cervical spine pain and myofascial neck pain.    He has failed rhizotomy of the cervical spine although the specific levels are unknown.    We will try to get his MRI uploaded into our [employer] system.  This is a full-body MRI that he paid for out of [place].    Return to clinic in 6-8 weeks.  If insufficient relief with the above plan, consider trigger point injections after reviewing his MRI.         Relevant Medications    meloxicam (MOBIC) 15 MG tablet    methocarbamoL (ROBAXIN) 500 MG Tab    Other Relevant Orders    Ambulatory referral/consult to Physical/Occupational Therapy     Other Visit Diagnoses       Cervical facet syndrome    -  Primary    Relevant Medications    meloxicam (MOBIC) 15 MG tablet    methocarbamoL (ROBAXIN) 500 MG Tab    Other Relevant Orders    Ambulatory referral/consult to Physical/Occupational Therapy          Sam May M.D.  Physical Medicine and Rehabilitation Medicine    This note was completed with dictation software and grammatical errors may exist.

## 2024-04-25 ENCOUNTER — CLINICAL SUPPORT (OUTPATIENT)
Dept: REHABILITATION | Facility: HOSPITAL | Age: 69
End: 2024-04-25
Attending: STUDENT IN AN ORGANIZED HEALTH CARE EDUCATION/TRAINING PROGRAM
Payer: COMMERCIAL

## 2024-04-25 DIAGNOSIS — R29.898 DECREASED RANGE OF MOTION OF NECK: Primary | ICD-10-CM

## 2024-04-25 DIAGNOSIS — R29.898 WEAKNESS OF BOTH SHOULDERS: ICD-10-CM

## 2024-04-25 DIAGNOSIS — M54.2 MYOFASCIAL NECK PAIN: ICD-10-CM

## 2024-04-25 DIAGNOSIS — M47.812 CERVICAL FACET SYNDROME: ICD-10-CM

## 2024-04-25 PROCEDURE — 97112 NEUROMUSCULAR REEDUCATION: CPT | Mod: PO | Performed by: PHYSICAL THERAPIST

## 2024-04-25 PROCEDURE — 97162 PT EVAL MOD COMPLEX 30 MIN: CPT | Mod: PO | Performed by: PHYSICAL THERAPIST

## 2024-04-25 NOTE — PLAN OF CARE
[facility]   Physical Therapy Initial Evaluation      Name: Tex Pickard III  Clinic Number: [id number]    Therapy Diagnosis:   Encounter Diagnoses   Name Primary?    Cervical facet syndrome     Myofascial neck pain     Decreased range of motion of neck Yes    Weakness of both shoulders         Physician: Sam May*    Physician Orders: PT Eval and Treat   Post Surgical? No   Eval and Treat Yes   Type of Therapy Outpatient Therapy   Location: Neck     Medical Diagnosis from Referral:   M47.812 (ICD-10-CM) - Cervical facet syndrome   M54.2 (ICD-10-CM) - Myofascial neck pain     Evaluation Date: 4/25/2024  Authorization Period Expiration: 04/24/2025   Plan of Care Expiration: 6/14/2024  Progress Note Due: 5/24/2024  Date of Surgery: na  Visit # / Visits authorized: 1/ 1   FOTO: 1/ 3 (4/25/2024)    Precautions: Standard     Time In: 1305  Time Out: 1350  Total Billable Time: 45 minutes    Subjective     Date of onset: 5 yrs    History of current condition - Tex reports: His pain seemed to begin approx 5 years ago without inciting event. The pain in bilateral and radiates into both traps but not there arms. He was a patient of Dr. Gonsalo Cheema. He recalls having a rhizotomy approx 3-4 years ago with little relief of his pain. He denies ever having epidurals for his pain. Pain is worse with flexion and rotation.  His pain is rated 6/10 and describes as a sharp and shooting pain.  Source with flexing.  He also complains of some joint stiffness.  He essentially has to hold his head upright due to his neck pain.     Pain Intervention History:  Rhizotomy of the C spine 3-4 years ago  Rhizotomy of the L spine approx 10 years ago with Dr. Nicolas Salinas     Past Spine Surgical History:  none     Past and current medications:  Antineuropathics: none  NSAIDs: none  Physical therapy: none  Antidepressants: none  Muscle relaxers: none  Opioids: none  Antiplatelets/Anticoagulants:  none    Falls: 0    Imaging: none: Pt will bring the report for scanning.    Prior Therapy: none  Social History:  lives with their spouse  Occupation: contractor  Prior Level of Function: Chronic neck pain radiating to head.  Current Level of Function: difficulty with head turning and driving.    Pain:  Current 5/10, worst 9/10, best 5/10   Location: bilateral neck    Description: Sharp  Aggravating Factors: Bending  Easing Factors: heating pad    Patients goals: to dec pain and manage condition.     Medical History:   Past Medical History:   Diagnosis Date    Arthritis     Colon polyps     History of colon polyps 4/14/2014    Hypertension     Renal disorder     Sleep apnea     wears CPAP     Surgical History:   Tex Pickard III  has a past surgical history that includes Gastric bypass (1999); Liposuction (2011); Cataract extraction w/  intraocular lens implant; Eye surgery; Colonoscopy w/ polypectomy (9/26/2008  Juan); Vasectomy; Eye surgery (Bilateral); Colonoscopy (N/A, 10/7/2019); Trigger finger release (Right, 11/12/2020); and Transurethral surgical removal of prostate (TURP) using green light laser (N/A, 10/26/2021).    Medications:   Tex has a current medication list which includes the following prescription(s): aspirin, bystolic, cholecalciferol (vitamin d3), cyanocobalamin (vitamin b-12), finasteride, gabapentin, glucosamine/chondroitin/c/joselito, losartan, meloxicam, methocarbamol, multivitamin, omeprazole, ondansetron, oxymetazoline, pravastatin, slow release iron, and testosterone cypionate, and the following Facility-Administered Medications: diphenhydramine, hydromorphone, lorazepam, ondansetron, and sodium chloride 0.9%.    Allergies:   Review of patient's allergies indicates:   Allergen Reactions    Cefaclor Hives     ceclor    Codeine Hives      Objective      Observation: Little cervical AROM rot from trunk.    Posture: impaired    Cervical Range of Motion:    Degrees Pain   Flexion 24 ++    "  Extension 28 +     Right Rotation nt nt     Left Rotation nt nt     Right Side Bending 24 +   Left Side Bending 24 +      Shoulder Range of Motion:   Shoulder Left Right   Flexion wnl wnl   Abduction wnl wnl   ER wnl wnl   IR wnl wnl     Strength:  Cervical MMT   Flexion 3+/5   Extension 3+/5   Right Side Bend 3+/5   Left Side Bend 3+/5     Upper Extremity Strength  (R) UE  (L) UE    Shoulder flexion: 4+/5 Shoulder flexion: 4+/5   Shoulder Abduction: 4+/5 Shoulder abduction: 4+/5   Shoulder ER 4+/5 Shoulder ER 4/5   Shoulder IR 4/5 Shoulder IR 4/5   Elbow flexion: 5/5 Elbow flexion: 5/5   Elbow extension: 5/5 Elbow extension: 5/5   Lower Trap 4-/5 Lower Trap 4-/5   Middle Trap 4/5 Middle Trap 4/5   Rhomboids 4+/5 Rhomboids 4+/5     Special Tests:  Distraction -   Compression +   Spurlings +     Joint Mobility: hypo cervical downslides bilateral    Palpation: + bilateral SCM origin and at base of occiput , cervical paraspinals     Sensation: intact bilateral UEs    Intake Outcome Measure for FOTO NECK Survey    Therapist reviewed FOTO scores for Tex TillmanNeeraj III on 4/25/2024.   FOTO report - see Media section or FOTO account episode details.    Intake Score: 63  Goal: 65       Treatment     Total Treatment time (time-based codes) separate from Evaluation: 08 minutes     Tex received the treatments listed below:      neuromuscular re-education activities to improve: Sense, Proprioception, Posture, and EDUCATION for 08 minutes. The following activities were included:  X 20  Chin tuck  Scapular retraction  No money  UPPER TRAP stretch 3 x 30"  Twice a day    Patient Education and Home Exercises     Education provided:   - HEP  - Pt/family was provided educational information, including: role of PT, role of pt/caregiver, goals for PT, POC, scheduling, and attendance policy.- pt verbalized understanding.     Written Home Exercises Provided: yes. Exercises were reviewed and Tex was able to demonstrate them prior to " the end of the session.  Tex demonstrated good  understanding of the education provided. See EMR under Patient Instructions for exercises provided during therapy sessions.    Assessment     Tex is a 68 y.o. male referred to outpatient Physical Therapy with a medical diagnosis of Myofascial neck pain and cervical facet syndrome. Patient presents with little movement of c/s and uniform motion likely d/t arthritic changes. Will assess accessory motion more in future. Myofascial tightness is noted throughout suboccipitals and paraspinals. Encouraged pt holding on resisted neck rot. Shoulder weakness and neck weakness is present.    Patient prognosis is Good.   Patient will benefit from skilled outpatient Physical Therapy to address the deficits stated above and in the chart below, provide patient /family education, and to maximize patientt's level of independence.     Plan of care discussed with patient: Yes  Patient's spiritual, cultural and educational needs considered and patient is agreeable to the plan of care and goals as stated below:     Anticipated Barriers for therapy: age    Medical Necessity is demonstrated by the following  History  Co-morbidities and personal factors that may impact the plan of care [] LOW: no personal factors / co-morbidities  [] MODERATE: 1-2 personal factors / co-morbidities  [x] HIGH: 3+ personal factors / co-morbidities    Moderate / High Support Documentation:   Co-morbidities affecting plan of care:   Past Medical History:   Diagnosis Date    Arthritis     Colon polyps     History of colon polyps 4/14/2014    Hypertension     Renal disorder     Sleep apnea     wears CPAP       Personal Factors:   age     Examination  Body Structures and Functions, activity limitations and participation restrictions that may impact the plan of care [] LOW: addressing 1-2 elements  [] MODERATE: 3+ elements  [x] HIGH: 4+ elements (please support below)    Moderate / High Support Documentation:  Patient presents with little movement of c/s and uniform motion likely d/t arthritic changes. Will assess accessory motion more in future. Myofascial tightness is noted throughout suboccipitals and paraspinals. Encouraged pt holding on resisted neck rot. Shoulder weakness and neck weakness is present.     Clinical Presentation [] LOW: stable  [x] MODERATE: Evolving  [] HIGH: Unstable     Decision Making/ Complexity Score: moderate       Goals:  Short Term Goals: 4 weeks   -Improve c/s AROM 3-5 deg for improved head turning.  -c/s strength grossly 4-/5 for improving head hold.  -Pt will report pain decreased by 25% for improving QOL.    Long Term Goals: 8 weeks   -Pt will report pain decreased by 35+% for improve pn and QOL.  -Pt will transition to Healthy Neck program or maintenance.    Plan     Plan of care Certification: 4/25/2024 to 6/17/2024.    Outpatient Physical Therapy 2 times weekly for 8 weeks to include the following interventions: Electrical Stimulation IFC, Manual Therapy, Moist Heat/ Ice, Neuromuscular Re-ed, Patient Education, Self Care, Therapeutic Activities, Therapeutic Exercise, Ultrasound, and dry needling.     Edita Matthews, PT        Physician's Signature: _________________________________________ Date: ________________

## 2024-05-01 ENCOUNTER — CLINICAL SUPPORT (OUTPATIENT)
Dept: REHABILITATION | Facility: HOSPITAL | Age: 69
End: 2024-05-01
Payer: COMMERCIAL

## 2024-05-01 DIAGNOSIS — R29.898 DECREASED RANGE OF MOTION OF NECK: Primary | ICD-10-CM

## 2024-05-01 DIAGNOSIS — R29.898 WEAKNESS OF BOTH SHOULDERS: ICD-10-CM

## 2024-05-01 PROCEDURE — 97112 NEUROMUSCULAR REEDUCATION: CPT | Mod: PO | Performed by: PHYSICAL THERAPIST

## 2024-05-01 PROCEDURE — 97110 THERAPEUTIC EXERCISES: CPT | Mod: PO | Performed by: PHYSICAL THERAPIST

## 2024-05-01 PROCEDURE — 97140 MANUAL THERAPY 1/> REGIONS: CPT | Mod: PO | Performed by: PHYSICAL THERAPIST

## 2024-05-01 NOTE — PROGRESS NOTES
"  [facility]   Physical Therapy Treatment Note      Name: Tex Pickard III  Clinic Number: [id number]    Therapy Diagnosis:   Encounter Diagnoses   Name Primary?    Decreased range of motion of neck Yes    Weakness of both shoulders      Physician: Sam May*    Visit Date: 5/1/2024    Physician Orders: PT Eval and Treat   Post Surgical? No   Eval and Treat Yes   Type of Therapy Outpatient Therapy   Location: Neck      Medical Diagnosis from Referral:   M47.812 (ICD-10-CM) - Cervical facet syndrome   M54.2 (ICD-10-CM) - Myofascial neck pain      Evaluation Date: 4/25/2024  Authorization Period Expiration: 12/31/2024   Plan of Care Expiration: 6/14/2024  Progress Note Due: 5/24/2024  Date of Surgery: na  Visit # / Visits authorized: 1/ 1, 1/20   FOTO: 1/ 3 (4/25/2024)     Precautions: Standard      Time In: 801  Time Out: 900  Total Billable Time: 55 minutes    PTA Visit #: 0/5     Subjective     Patient reports: doing the home program. Soreness lower c/s and Uts.  He was compliant with home exercise program.  Response to previous treatment: no adverse effect  Functional change: tbd    Pain: 3/10  Location: bilateral neck and shoulder      Objective      Objective Measures updated at progress report unless specified.     Treatment     Tex received the treatments listed below:      therapeutic exercises to develop strength, endurance, ROM, flexibility, and posture for 15 minutes including:  UBE 3'3' Left 1.5  Seated UPPER TRAP stretch 3 x 30"  Seated Levator Scapulae stretch 3 x 30"    manual therapy techniques: Joint mobilizations, Myofacial release, and Soft tissue Mobilization were applied to the: bilateral neck and shoulders for 25 minutes, including:    STM to Uts and c/s paraspinals with ttp release  Discussed the purpose, mechanism, and indications for dry needling with Tex . Patient was cleared of all precautions and contraindications and pt signed written consent " and gave verbal consent to dry needling Rx today.   Palpation used to determine dry needling sites. Increased tone noted at bilateral right>Left Uts, Levator Scapulae, and c/s paraspinals. Pt rec'd dry needling in prone position to Uts, Levator Scapulae, and c/s paraspinals with 0.30 x 40 mm needles.  Pt tolerated treatment well and was not in any distress at the completion of treatment.      neuromuscular re-education activities to improve: Sense, Proprioception, and Posture for 15 minutes. The following activities were included:  Seated chin tuck, cues given, x 20  Scapular retraction x 20  No money x 20    therapeutic activities to improve functional performance for 0 minutes, includin    Patient Education and Home Exercises       Education provided:   - Cont HEP  - + Levator Scapulae stretch   - DN and consent    Written Home Exercises Provided: Patient instructed to cont prior HEP. Exercises were reviewed and Tex was able to demonstrate them prior to the end of the session.  Tex demonstrated good  understanding of the education provided. See Electronic Medical Record under Patient Instructions for exercises provided during therapy sessions    Assessment     Tex tolerated initial tx well without adverse effect. Twitch response was noted and palpation elicited tenderness. He stated that he is doing the home program and needed little cues today. He will benefit from continued postural correction and flexibility as he has ttps in the above muscle groups.    Tex Is progressing well towards his goals.   Patient prognosis is Good.     Patient will continue to benefit from skilled outpatient physical therapy to address the deficits listed in the problem list box on initial evaluation, provide pt/family education and to maximize pt's level of independence in the home and community environment.     Patient's spiritual, cultural and educational needs considered and pt agreeable to plan of care and goals.      Anticipated barriers to physical therapy: age     Goals:  (ongoing)  Short Term Goals: 4 weeks   -Improve c/s AROM 3-5 deg for improved head turning.  -c/s strength grossly 4-/5 for improving head hold.  -Pt will report pain decreased by 25% for improving QOL.     Long Term Goals: 8 weeks   -Pt will report pain decreased by 35+% for improve pn and QOL.  -Pt will transition to Healthy Neck program or maintenance.    Plan     Plan of care Certification: 4/25/2024 to 6/17/2024.     Outpatient Physical Therapy 2 times weekly for 8 weeks to include the following interventions: Electrical Stimulation IFC, Manual Therapy, Moist Heat/ Ice, Neuromuscular Re-ed, Patient Education, Self Care, Therapeutic Activities, Therapeutic Exercise, Ultrasound, and dry needling.     Edita Matthews, PT

## 2024-05-03 ENCOUNTER — CLINICAL SUPPORT (OUTPATIENT)
Dept: REHABILITATION | Facility: HOSPITAL | Age: 69
End: 2024-05-03
Payer: COMMERCIAL

## 2024-05-03 DIAGNOSIS — R29.898 DECREASED RANGE OF MOTION OF NECK: Primary | ICD-10-CM

## 2024-05-03 DIAGNOSIS — R29.898 WEAKNESS OF BOTH SHOULDERS: ICD-10-CM

## 2024-05-03 PROCEDURE — 97140 MANUAL THERAPY 1/> REGIONS: CPT | Mod: PO,CQ

## 2024-05-03 PROCEDURE — 97110 THERAPEUTIC EXERCISES: CPT | Mod: PO,CQ

## 2024-05-03 PROCEDURE — 97112 NEUROMUSCULAR REEDUCATION: CPT | Mod: PO,CQ

## 2024-05-03 NOTE — PROGRESS NOTES
"  [facility]   Physical Therapy Treatment Note      Name: Tex Pickard III  Clinic Number: [id number]    Therapy Diagnosis:   Encounter Diagnoses   Name Primary?    Decreased range of motion of neck Yes    Weakness of both shoulders      Physician: Sam May*    Visit Date: 5/3/2024    Physician Orders: PT Eval and Treat   Post Surgical? No   Eval and Treat Yes   Type of Therapy Outpatient Therapy   Location: Neck      Medical Diagnosis from Referral:   M47.812 (ICD-10-CM) - Cervical facet syndrome   M54.2 (ICD-10-CM) - Myofascial neck pain      Evaluation Date: 4/25/2024  Authorization Period Expiration: 12/31/2024   Plan of Care Expiration: 6/14/2024  Progress Note Due: 5/24/2024  Date of Surgery: na  Visit # / Visits authorized: 1/ 1, 3/20   FOTO: 1/ 3 (4/25/2024)     Precautions: Standard      Time In: 801  Time Out: 900  Total Billable Time: 59 minutes    PTA Visit #: 0/5     Subjective     Patient reports: doing the home program w/o difficulty.   He was compliant with home exercise program.  Response to previous treatment: no adverse effect  Functional change: tbd    Pain: 3/10  Location: bilateral neck and shoulder      Objective      Objective Measures updated at progress report unless specified.     Treatment     Tex received the treatments listed below:      therapeutic exercises to develop strength, endurance, ROM, flexibility, and posture for 15 minutes including:  UBE 3'3' Left 1.5  Seated UPPER TRAP stretch 3 x 30"  Seated Levator Scapulae stretch 3 x 30"  Supine rotation B 20x  Supine ext 20x  Supine chin tuck 20x    manual therapy techniques: Joint mobilizations, Myofacial release, and Soft tissue Mobilization were applied to the: bilateral neck and shoulders for 15 minutes, including:  STM passive stretch to B UT and Lev scap, manual intermittent distraction   STM to Uts and c/s paraspinals with ttp release  Discussed the purpose, mechanism, and indications " for dry needling with Tex . Patient was cleared of all precautions and contraindications and pt signed written consent and gave verbal consent to dry needling Rx today.   Palpation used to determine dry needling sites. Increased tone noted at bilateral right>Left Uts, Levator Scapulae, and c/s paraspinals. Pt rec'd dry needling in prone position to Uts, Levator Scapulae, and c/s paraspinals with 0.30 x 40 mm needles.  Pt tolerated treatment well and was not in any distress at the completion of treatment.      neuromuscular re-education activities to improve: Sense, Proprioception, and Posture for 30 minutes. The following activities were included:  Seated chin tuck, cues given, x 20  Scapular retraction x 20  No money x 20 yellow t-band  ISO retraction in sitting with yellow t-band 20x  Supine [id number] 3 sec hold  Slouch over correct 3 sec hold 20x  Seated scap retraction 3 sec hold 20x    therapeutic activities to improve functional performance for 0 minutes, includin    Patient Education and Home Exercises       Education provided:   - Cont HEP  - + Levator Scapulae stretch   - DN and consent    Written Home Exercises Provided: Patient instructed to cont prior HEP. Exercises were reviewed and Tex was able to demonstrate them prior to the end of the session.  Tex demonstrated good  understanding of the education provided. See Electronic Medical Record under Patient Instructions for exercises provided during therapy sessions    Assessment     Tex tolerated today's tx with progression of ther ex, neuromuscular re-ed and manual intervention well. He was able to progress with posture, scap and cervical stab exs today w/o difficulty or complaint. Few VCs required for proper form and posture with new exs. He will benefit from continued postural correction and flexibility as he has ttps in the above muscle groups.    Tex Is progressing well towards his goals.   Patient prognosis is Good.     Patient will  continue to benefit from skilled outpatient physical therapy to address the deficits listed in the problem list box on initial evaluation, provide pt/family education and to maximize pt's level of independence in the home and community environment.     Patient's spiritual, cultural and educational needs considered and pt agreeable to plan of care and goals.     Anticipated barriers to physical therapy: age     Goals:  (ongoing)  Short Term Goals: 4 weeks   -Improve c/s AROM 3-5 deg for improved head turning.  -c/s strength grossly 4-/5 for improving head hold.  -Pt will report pain decreased by 25% for improving QOL.     Long Term Goals: 8 weeks   -Pt will report pain decreased by 35+% for improve pn and QOL.  -Pt will transition to Healthy Neck program or maintenance.    Plan     Plan of care Certification: 4/25/2024 to 6/17/2024.     Outpatient Physical Therapy 2 times weekly for 8 weeks to include the following interventions: Electrical Stimulation IFC, Manual Therapy, Moist Heat/ Ice, Neuromuscular Re-ed, Patient Education, Self Care, Therapeutic Activities, Therapeutic Exercise, Ultrasound, and dry needling.     Fernando Lynn, PTA

## 2024-05-08 ENCOUNTER — CLINICAL SUPPORT (OUTPATIENT)
Dept: REHABILITATION | Facility: HOSPITAL | Age: 69
End: 2024-05-08
Payer: COMMERCIAL

## 2024-05-08 DIAGNOSIS — R29.898 DECREASED RANGE OF MOTION OF NECK: Primary | ICD-10-CM

## 2024-05-08 DIAGNOSIS — R29.898 WEAKNESS OF BOTH SHOULDERS: ICD-10-CM

## 2024-05-08 PROCEDURE — 97140 MANUAL THERAPY 1/> REGIONS: CPT | Mod: PO | Performed by: PHYSICAL THERAPIST

## 2024-05-08 PROCEDURE — 97110 THERAPEUTIC EXERCISES: CPT | Mod: PO | Performed by: PHYSICAL THERAPIST

## 2024-05-08 PROCEDURE — 97112 NEUROMUSCULAR REEDUCATION: CPT | Mod: PO | Performed by: PHYSICAL THERAPIST

## 2024-05-08 NOTE — PROGRESS NOTES
"  [facility]   Physical Therapy Treatment Note      Name: Tex Pickard III  Clinic Number: [id number]    Therapy Diagnosis:   Encounter Diagnoses   Name Primary?    Decreased range of motion of neck Yes    Weakness of both shoulders      Physician: Sam May*    Visit Date: 5/8/2024    Physician Orders: PT Eval and Treat   Post Surgical? No   Eval and Treat Yes   Type of Therapy Outpatient Therapy   Location: Neck      Medical Diagnosis from Referral:   M47.812 (ICD-10-CM) - Cervical facet syndrome   M54.2 (ICD-10-CM) - Myofascial neck pain      Evaluation Date: 4/25/2024  Authorization Period Expiration: 12/31/2024   Plan of Care Expiration: 6/14/2024  Progress Note Due: 5/24/2024  Date of Surgery: na  Visit # / Visits authorized: 1/ 1, 4/20   FOTO: 1/ 3 (4/25/2024)     Precautions: Standard      Time In: 1010  Time Out: 1100  Total Billable Time: 50 minutes    PTA Visit #: 0/5     Subjective     Patient reports: doing the home program w/o difficulty.   He was compliant with home exercise program.  Response to previous treatment: no adverse effect  Functional change: tbd    Pain: 3/10  Location: bilateral neck and shoulder      Objective      Objective Measures updated at progress report unless specified.     Treatment     Tex received the treatments listed below:      therapeutic exercises to develop strength, endurance, ROM, flexibility, and posture for 15 minutes including:  UBE 3'3' Left 1.5  Seated UPPER TRAP stretch 3 x 30"  Seated Levator Scapulae stretch 3 x 30"  Supine rotation B 20x  Supine ext 20x  Supine chin tuck 20x    manual therapy techniques: Joint mobilizations, Myofacial release, and Soft tissue Mobilization were applied to the: bilateral neck and shoulders for 15 minutes, including:  STM passive stretch to B UT and Lev scap, manual intermittent distraction -np  STM to Uts and c/s paraspinals with ttp release-np  Discussed the purpose, mechanism, and " indications for dry needling with Tex . Patient was cleared of all precautions and contraindications and pt signed written consent and gave verbal consent to dry needling Rx today.   Palpation used to determine dry needling sites. Increased tone noted at bilateral c/s paraspinals. Pt rec'd dry needling in prone position to c/s paraspinals with 0.30 x 40 mm needles.  Pt tolerated treatment well and was not in any distress at the completion of treatment.      neuromuscular re-education activities to improve: Sense, Proprioception, and Posture for 30 minutes. The following activities were included:  Seated chin tuck, cues given, x 20  Scapular retraction x 20  No money x 20 yellow t-band  ISO retraction in sitting with yellow t-band 20x  Slouch over correct 3 sec hold 20x  Seated scap retraction 3 sec hold 20x    therapeutic activities to improve functional performance for 0 minutes, includin    Patient Education and Home Exercises       Education provided:   - Cont HEP  - + Levator Scapulae stretch   - DN and consent    Written Home Exercises Provided: Patient instructed to cont prior HEP. Exercises were reviewed and Tex was able to demonstrate them prior to the end of the session.  Tex demonstrated good  understanding of the education provided. See Electronic Medical Record under Patient Instructions for exercises provided during therapy sessions    Assessment     Tex reported mild improvement in sx and not feeling discomfort of UPPER TRAP. His pain appears a bit more centralized to lower cervical paraspinals with C5 activating during DN bilaterally. ROM not significantly improved but pn sx improved. Red band given to pt for home program. He will benefit from continued postural correction and flexibility as he has ttps in the above muscle groups.    Tex Is progressing well towards his goals.   Patient prognosis is Good.     Patient will continue to benefit from skilled outpatient physical therapy to address  the deficits listed in the problem list box on initial evaluation, provide pt/family education and to maximize pt's level of independence in the home and community environment.     Patient's spiritual, cultural and educational needs considered and pt agreeable to plan of care and goals.     Anticipated barriers to physical therapy: age     Goals:  (ongoing)  Short Term Goals: 4 weeks   -Improve c/s AROM 3-5 deg for improved head turning.  -c/s strength grossly 4-/5 for improving head hold.  -Pt will report pain decreased by 25% for improving QOL.     Long Term Goals: 8 weeks   -Pt will report pain decreased by 35+% for improve pn and QOL.  -Pt will transition to Healthy Neck program or maintenance.    Plan     Plan of care Certification: 4/25/2024 to 6/17/2024.     Outpatient Physical Therapy 2 times weekly for 8 weeks to include the following interventions: Electrical Stimulation IFC, Manual Therapy, Moist Heat/ Ice, Neuromuscular Re-ed, Patient Education, Self Care, Therapeutic Activities, Therapeutic Exercise, Ultrasound, and dry needling.     Edita Matthews, PT

## 2024-05-10 ENCOUNTER — CLINICAL SUPPORT (OUTPATIENT)
Dept: REHABILITATION | Facility: HOSPITAL | Age: 69
End: 2024-05-10
Payer: COMMERCIAL

## 2024-05-10 DIAGNOSIS — R29.898 DECREASED RANGE OF MOTION OF NECK: Primary | ICD-10-CM

## 2024-05-10 DIAGNOSIS — R29.898 WEAKNESS OF BOTH SHOULDERS: ICD-10-CM

## 2024-05-10 PROCEDURE — 97112 NEUROMUSCULAR REEDUCATION: CPT | Mod: PO | Performed by: PHYSICAL THERAPIST

## 2024-05-10 PROCEDURE — 97110 THERAPEUTIC EXERCISES: CPT | Mod: PO | Performed by: PHYSICAL THERAPIST

## 2024-05-10 PROCEDURE — 97140 MANUAL THERAPY 1/> REGIONS: CPT | Mod: PO | Performed by: PHYSICAL THERAPIST

## 2024-05-10 NOTE — PROGRESS NOTES
"  [facility]   Physical Therapy Treatment Note      Name: Tex Pickard III  Clinic Number: [id number]    Therapy Diagnosis:   Encounter Diagnoses   Name Primary?    Decreased range of motion of neck Yes    Weakness of both shoulders      Physician: Sam May*    Visit Date: 5/10/2024    Physician Orders: PT Eval and Treat   Post Surgical? No   Eval and Treat Yes   Type of Therapy Outpatient Therapy   Location: Neck      Medical Diagnosis from Referral:   M47.812 (ICD-10-CM) - Cervical facet syndrome   M54.2 (ICD-10-CM) - Myofascial neck pain      Evaluation Date: 4/25/2024  Authorization Period Expiration: 12/31/2024   Plan of Care Expiration: 6/14/2024  Progress Note Due: 5/24/2024  Date of Surgery: na  Visit # / Visits authorized: 1/ 1, 4/20   FOTO: 1/ 3 (4/25/2024)     Precautions: Standard      Time In: 810  Time Out: 905  Total Billable Time: 55 minutes    PTA Visit #: 0/5     Subjective     Patient reports: doing the home program w/o difficulty. Decreased neck pain.  He was compliant with home exercise program.  Response to previous treatment: no adverse effect  Functional change: tbd    Pain: 1/10  Location: bilateral neck and shoulder      Objective      Objective Measures updated at progress report unless specified.     Treatment     Tex received the treatments listed below:      therapeutic exercises to develop strength, endurance, ROM, flexibility, and posture for 15 minutes including:  UBE 3'3' L2.0  Seated UPPER TRAP stretch 3 x 30"  Seated Levator Scapulae stretch 3 x 30"  Supine rotation B 20x  Supine ext 20x  Supine chin tuck 20x    manual therapy techniques: Joint mobilizations, Myofacial release, and Soft tissue Mobilization were applied to the: bilateral neck and shoulders for 10 minutes, including:  STM passive stretch to B UT and Lev scap, manual intermittent distraction -np  STM to Uts and c/s paraspinals with ttp release-np  Discussed the purpose, " mechanism, and indications for dry needling with Tex . Patient was cleared of all precautions and contraindications and pt signed written consent and gave verbal consent to dry needling Rx today.   Palpation used to determine dry needling sites. Increased tone noted at bilateral c/s paraspinals. Pt rec'd dry needling in prone position to c/s paraspinals with 0.30 x 40 mm needles.  Pt tolerated treatment well and was not in any distress at the completion of treatment.      neuromuscular re-education activities to improve: Sense, Proprioception, and Posture for 30 minutes. The following activities were included:  Seated chin tuck, cues given, x 20  No money x 20 red t-band  ISO retraction in sitting with red t-band 20x  Slouch over correct 3 sec hold 20x  Seated scap retraction 3 sec hold 20x  Head snags x 10 each    therapeutic activities to improve functional performance for 0 minutes, includin    Patient Education and Home Exercises       Education provided:   - Cont HEP  - + Levator Scapulae stretch   - DN and consent    Written Home Exercises Provided: Patient instructed to cont prior HEP. Exercises were reviewed and Tex was able to demonstrate them prior to the end of the session.  Tex demonstrated good  understanding of the education provided. See Electronic Medical Record under Patient Instructions for exercises provided during therapy sessions    Assessment     Tex has pain of cervical paraspinals. This is improving with a combination of ex and DN. He will benefit from continued postural correction and flexibility as he has ttps in the above muscle groups. AROM is mildly improving.    Tex Is progressing well towards his goals.   Patient prognosis is Good.     Patient will continue to benefit from skilled outpatient physical therapy to address the deficits listed in the problem list box on initial evaluation, provide pt/family education and to maximize pt's level of independence in the home and  community environment.     Patient's spiritual, cultural and educational needs considered and pt agreeable to plan of care and goals.     Anticipated barriers to physical therapy: age     Goals:  (ongoing)  Short Term Goals: 4 weeks   -Improve c/s AROM 3-5 deg for improved head turning.  -c/s strength grossly 4-/5 for improving head hold.  -Pt will report pain decreased by 25% for improving QOL.     Long Term Goals: 8 weeks   -Pt will report pain decreased by 35+% for improve pn and QOL.  -Pt will transition to Healthy Neck program or maintenance.    Plan     Plan of care Certification: 4/25/2024 to 6/17/2024.     Outpatient Physical Therapy 2 times weekly for 8 weeks to include the following interventions: Electrical Stimulation IFC, Manual Therapy, Moist Heat/ Ice, Neuromuscular Re-ed, Patient Education, Self Care, Therapeutic Activities, Therapeutic Exercise, Ultrasound, and dry needling.     Edita Matthews, PT

## 2024-05-15 ENCOUNTER — CLINICAL SUPPORT (OUTPATIENT)
Dept: REHABILITATION | Facility: HOSPITAL | Age: 69
End: 2024-05-15
Payer: COMMERCIAL

## 2024-05-15 DIAGNOSIS — R29.898 DECREASED RANGE OF MOTION OF NECK: Primary | ICD-10-CM

## 2024-05-15 DIAGNOSIS — R29.898 WEAKNESS OF BOTH SHOULDERS: ICD-10-CM

## 2024-05-15 PROCEDURE — 97140 MANUAL THERAPY 1/> REGIONS: CPT | Mod: PO,CQ

## 2024-05-15 PROCEDURE — 97110 THERAPEUTIC EXERCISES: CPT | Mod: PO,CQ

## 2024-05-15 PROCEDURE — 97112 NEUROMUSCULAR REEDUCATION: CPT | Mod: PO,CQ

## 2024-05-15 NOTE — PROGRESS NOTES
"  [facility]   Physical Therapy Treatment Note      Name: Tex Pickard III  Clinic Number: [id number]    Therapy Diagnosis:   Encounter Diagnoses   Name Primary?    Decreased range of motion of neck Yes    Weakness of both shoulders      Physician: Sam May*    Visit Date: 5/15/2024    Physician Orders: PT Eval and Treat   Post Surgical? No   Eval and Treat Yes   Type of Therapy Outpatient Therapy   Location: Neck      Medical Diagnosis from Referral:   M47.812 (ICD-10-CM) - Cervical facet syndrome   M54.2 (ICD-10-CM) - Myofascial neck pain      Evaluation Date: 4/25/2024  Authorization Period Expiration: 12/31/2024   Plan of Care Expiration: 6/14/2024  Progress Note Due: 5/24/2024  Date of Surgery: na  Visit # / Visits authorized: 1/ 1, 5/20   FOTO: 1/ 3 (4/25/2024)     Precautions: Standard      Time In: 8:02  Time Out: 9:05  Total Billable Time: 63 minutes    PTA Visit #: 0/5     Subjective     Patient reports:that his neck is feeling better. He reports that he feels the DN is helping.  He was compliant with home exercise program.  Response to previous treatment: no adverse effect  Functional change: tbd    Pain: 2/10  Location: bilateral neck and shoulder      Objective      Objective Measures updated at progress report unless specified.     Treatment     Tex received the treatments listed below:      therapeutic exercises to develop strength, endurance, ROM, flexibility, and posture for 20 minutes including:  UBE 3'3' L2.0  Seated UPPER TRAP stretch 3 x 30"  Seated Levator Scapulae stretch 3 x 30"  Supine rotation B 20x  Supine ext 20x  Supine chin tuck 20x  Lat stretch with dowel 3 sec x 20    manual therapy techniques: Joint mobilizations, Myofacial release, and Soft tissue Mobilization were applied to the: bilateral neck and shoulders for 10 minutes, including:  STM passive stretch to B UT and Lev scap, manual intermittent distraction -  STM to Uts and c/s " paraspinals with ttp release-    NP today  Discussed the purpose, mechanism, and indications for dry needling with Tex . Patient was cleared of all precautions and contraindications and pt signed written consent and gave verbal consent to dry needling Rx today.   Palpation used to determine dry needling sites. Increased tone noted at bilateral c/s paraspinals. Pt rec'd dry needling in prone position to c/s paraspinals with 0.30 x 40 mm needles.  Pt tolerated treatment well and was not in any distress at the completion of treatment.      neuromuscular re-education activities to improve: Sense, Proprioception, and Posture for 30 minutes. The following activities were included:  Seated chin tuck, cues given, x 20  No money x 20 red t-band  ISO retraction in sitting with red t-band 20x  Slouch over correct 3 sec hold 20x  Seated scap retraction 3 sec hold 20x  Head snags x 10 each 5 sec hold    therapeutic activities to improve functional performance for 0 minutes, includin    Patient Education and Home Exercises       Education provided:   - Cont HEP  - + Levator Scapulae stretch   - DN and consent    Written Home Exercises Provided: Patient instructed to cont prior HEP. Exercises were reviewed and Tex was able to demonstrate them prior to the end of the session.  Tex demonstrated good  understanding of the education provided. See Electronic Medical Record under Patient Instructions for exercises provided during therapy sessions    Assessment     Tex anette today's tx with progression of ther ex, neuromuscular re-ed and manual intervention well. He performed lat stretch with dowel today w/o difficulty or complaint.  His condition is improving with a combination of ex and DN. He will benefit from continued postural correction and flexibility as he continues to have some ttps in the above muscle groups. AROM is mildly improving.    Tex Is progressing well towards his goals.   Patient prognosis is Good.     Patient  will continue to benefit from skilled outpatient physical therapy to address the deficits listed in the problem list box on initial evaluation, provide pt/family education and to maximize pt's level of independence in the home and community environment.     Patient's spiritual, cultural and educational needs considered and pt agreeable to plan of care and goals.     Anticipated barriers to physical therapy: age     Goals:  (ongoing)  Short Term Goals: 4 weeks   -Improve c/s AROM 3-5 deg for improved head turning.  -c/s strength grossly 4-/5 for improving head hold.  -Pt will report pain decreased by 25% for improving QOL.     Long Term Goals: 8 weeks   -Pt will report pain decreased by 35+% for improve pn and QOL.  -Pt will transition to Healthy Neck program or maintenance.    Plan     Plan of care Certification: 4/25/2024 to 6/17/2024.     Outpatient Physical Therapy 2 times weekly for 8 weeks to include the following interventions: Electrical Stimulation IFC, Manual Therapy, Moist Heat/ Ice, Neuromuscular Re-ed, Patient Education, Self Care, Therapeutic Activities, Therapeutic Exercise, Ultrasound, and dry needling.     Fernando Lynn, PTA

## 2024-05-20 ENCOUNTER — CLINICAL SUPPORT (OUTPATIENT)
Dept: REHABILITATION | Facility: HOSPITAL | Age: 69
End: 2024-05-20
Payer: COMMERCIAL

## 2024-05-20 DIAGNOSIS — R29.898 WEAKNESS OF BOTH SHOULDERS: ICD-10-CM

## 2024-05-20 DIAGNOSIS — R29.898 DECREASED RANGE OF MOTION OF NECK: Primary | ICD-10-CM

## 2024-05-20 PROCEDURE — 97140 MANUAL THERAPY 1/> REGIONS: CPT | Mod: PO,CQ

## 2024-05-20 PROCEDURE — 97110 THERAPEUTIC EXERCISES: CPT | Mod: PO,CQ

## 2024-05-20 PROCEDURE — 97112 NEUROMUSCULAR REEDUCATION: CPT | Mod: PO,CQ

## 2024-05-20 NOTE — PROGRESS NOTES
"  [facility]   Physical Therapy Treatment Note      Name: Tex Pickard III  Clinic Number: [id number]    Therapy Diagnosis:   Encounter Diagnoses   Name Primary?    Decreased range of motion of neck Yes    Weakness of both shoulders      Physician: Sam May*    Visit Date: 5/20/2024    Physician Orders: PT Eval and Treat   Post Surgical? No   Eval and Treat Yes   Type of Therapy Outpatient Therapy   Location: Neck      Medical Diagnosis from Referral:   M47.812 (ICD-10-CM) - Cervical facet syndrome   M54.2 (ICD-10-CM) - Myofascial neck pain      Evaluation Date: 4/25/2024  Authorization Period Expiration: 12/31/2024   Plan of Care Expiration: 6/14/2024  Progress Note Due: 5/24/2024  Date of Surgery: na  Visit # / Visits authorized: 1/ 1, 6/20   FOTO: 1/ 3 (4/25/2024)     Precautions: Standard      Time In: 1457  Time Out: 1553  Total Billable Time: 54 minutes    PTA Visit #: 1/5     Subjective     Patient reports: his neck is slowly getting better. Patient states most of this pain is localized to the (L) side.He was compliant with home exercise program.  Response to previous treatment: no adverse effect  Functional change: tbd    Pain: 4-5/10  Location: left neck and shoulder      Objective      Objective Measures updated at progress report unless specified.     Treatment     Tex received the treatments listed below:      therapeutic exercises to develop strength, endurance, ROM, flexibility, and posture for 20 minutes including:  UBE 3'3' L2.0  Seated UPPER TRAP stretch 3 x 30"  Seated Levator Scapulae stretch 3 x 30"  Supine chin tuck 20x  Lat stretch with dowel 3 sec x 20  S/L open book x 10, 5 sec hold (PTA stabilizing hips)    manual therapy techniques: Joint mobilizations, Myofacial release, and Soft tissue Mobilization were applied to the: bilateral neck and shoulders for 10 minutes, including:  STM passive stretch to B UT and Lev scap, manual intermittent " distraction -  STM to Uts and c/s paraspinals with ttp release-    NP today  Discussed the purpose, mechanism, and indications for dry needling with Tex . Patient was cleared of all precautions and contraindications and pt signed written consent and gave verbal consent to dry needling Rx today.   Palpation used to determine dry needling sites. Increased tone noted at bilateral c/s paraspinals. Pt rec'd dry needling in prone position to c/s paraspinals with 0.30 x 40 mm needles.  Pt tolerated treatment well and was not in any distress at the completion of treatment.      neuromuscular re-education activities to improve: Sense, Proprioception, and Posture for 30 minutes. The following activities were included:  Seated chin tuck, cues given to correct extension 2x10  No money x 20 red t-band  ISO retraction in sitting with red t-band 20x  Seated scap retraction 3 sec hold 20x  Head snags x 10 each 5 sec hold  Seated in chair, Hands resting on stool: thoracic rotation x 10    therapeutic activities to improve functional performance for 0 minutes, includin    Patient Education and Home Exercises       Education provided:   - Cont HEP  - + Levator Scapulae stretch   - DN and consent    Written Home Exercises Provided: Patient instructed to cont prior HEP. Exercises were reviewed and Tex was able to demonstrate them prior to the end of the session.  Tex demonstrated good  understanding of the education provided. See Electronic Medical Record under Patient Instructions for exercises provided during therapy sessions    Assessment     Tex's cervical rotation has improved since start of skilled care with limitations due to pain continue.This is more prominent with left rotation vs right rotation. Tactile and visual cues for proper chin tuck form as patient tends to assume axial extension position. Good tolerance to addition of open books and thoracic rotation but LE/hip stabilization needed due to rotation  limitations.    Tex Is progressing well towards his goals.   Patient prognosis is Good.     Patient will continue to benefit from skilled outpatient physical therapy to address the deficits listed in the problem list box on initial evaluation, provide pt/family education and to maximize pt's level of independence in the home and community environment.     Patient's spiritual, cultural and educational needs considered and pt agreeable to plan of care and goals.     Anticipated barriers to physical therapy: age     Goals:  (ongoing)  Short Term Goals: 4 weeks   -Improve c/s AROM 3-5 deg for improved head turning.  -c/s strength grossly 4-/5 for improving head hold.  -Pt will report pain decreased by 25% for improving QOL.     Long Term Goals: 8 weeks   -Pt will report pain decreased by 35+% for improve pn and QOL.  -Pt will transition to Healthy Neck program or maintenance.    Plan     Plan of care Certification: 4/25/2024 to 6/17/2024.     Outpatient Physical Therapy 2 times weekly for 8 weeks to include the following interventions: Electrical Stimulation IFC, Manual Therapy, Moist Heat/ Ice, Neuromuscular Re-ed, Patient Education, Self Care, Therapeutic Activities, Therapeutic Exercise, Ultrasound, and dry needling.     Sofiya Card, PTA

## 2024-05-22 ENCOUNTER — CLINICAL SUPPORT (OUTPATIENT)
Dept: REHABILITATION | Facility: HOSPITAL | Age: 69
End: 2024-05-22
Payer: COMMERCIAL

## 2024-05-22 DIAGNOSIS — R29.898 DECREASED RANGE OF MOTION OF NECK: Primary | ICD-10-CM

## 2024-05-22 DIAGNOSIS — R29.898 WEAKNESS OF BOTH SHOULDERS: ICD-10-CM

## 2024-05-22 PROCEDURE — 97112 NEUROMUSCULAR REEDUCATION: CPT | Mod: PO,CQ

## 2024-05-22 PROCEDURE — 97110 THERAPEUTIC EXERCISES: CPT | Mod: PO,CQ

## 2024-05-22 PROCEDURE — 97140 MANUAL THERAPY 1/> REGIONS: CPT | Mod: PO,CQ

## 2024-05-22 NOTE — PROGRESS NOTES
"  [facility]   Physical Therapy Treatment Note      Name: Tex Pickard III  Clinic Number: [id number]    Therapy Diagnosis:   Encounter Diagnoses   Name Primary?    Decreased range of motion of neck Yes    Weakness of both shoulders      Physician: Sam May*    Visit Date: 5/22/2024    Physician Orders: PT Eval and Treat   Post Surgical? No   Eval and Treat Yes   Type of Therapy Outpatient Therapy   Location: Neck      Medical Diagnosis from Referral:   M47.812 (ICD-10-CM) - Cervical facet syndrome   M54.2 (ICD-10-CM) - Myofascial neck pain      Evaluation Date: 4/25/2024  Authorization Period Expiration: 12/31/2024   Plan of Care Expiration: 6/14/2024  Progress Note Due: 5/24/2024  Date of Surgery: na  Visit # / Visits authorized: 1/ 1, 7/20   FOTO: 1/ 3 (4/25/2024)     Precautions: Standard      Time In: 8:05  Time Out: 9:00  Total Billable Time: 55 minutes    PTA Visit #: 1/5     Subjective     Patient reports: that his neck is feeling better. He reports that his pn is mostly on the L side. He feels that the pn is muscular.  Response to previous treatment: no adverse effect  Functional change: tbd    Pain: 2/10  Location: left neck       Objective      Objective Measures updated at progress report unless specified.     Treatment     Tex received the treatments listed below:      therapeutic exercises to develop strength, endurance, ROM, flexibility, and posture for 20 minutes including:  UBE 3'3' L2.0  Seated UPPER TRAP stretch 3 x 30"  Seated Levator Scapulae stretch 3 x 30"  Supine chin tuck 20x  Lat stretch with dowel 3 sec x 20  S/L open book x 10, 5 sec hold (PTA stabilizing hips)    manual therapy techniques: Joint mobilizations, Myofacial release, and Soft tissue Mobilization were applied to the: bilateral neck and shoulders for 10 minutes, including:  STM passive stretch to B UT and Lev scap, manual intermittent distraction -  STM to Uts and c/s paraspinals with " ttp release-    NP today  Discussed the purpose, mechanism, and indications for dry needling with Tex . Patient was cleared of all precautions and contraindications and pt signed written consent and gave verbal consent to dry needling Rx today.   Palpation used to determine dry needling sites. Increased tone noted at bilateral c/s paraspinals. Pt rec'd dry needling in prone position to c/s paraspinals with 0.30 x 40 mm needles.  Pt tolerated treatment well and was not in any distress at the completion of treatment.      neuromuscular re-education activities to improve: Sense, Proprioception, and Posture for 30 minutes. The following activities were included:  Seated chin tuck, cues given to correct extension 2x10  No money x 20 red t-band  ISO retraction in sitting with red t-band 20x  Seated scap retraction 3 sec hold 20x  Head snags x 10 each 5 sec hold  Seated in chair, Hands resting on blue ball: thoracic rotation x 10    therapeutic activities to improve functional performance for 0 minutes, includin    Patient Education and Home Exercises       Education provided:   - Cont HEP  - + Levator Scapulae stretch   - DN and consent    Written Home Exercises Provided: Patient instructed to cont prior HEP. Exercises were reviewed and Tex was able to demonstrate them prior to the end of the session.  Tex demonstrated good  understanding of the education provided. See Electronic Medical Record under Patient Instructions for exercises provided during therapy sessions    Assessment     Tex anette today's tx with ther ex, neuromuscular re-ed and manual intervention well. He was w/o c/o pn sx throughout tx. He demonstrates improving pn free ROM with exs. His cervical rotation has improved since start of skilled care . Tactile and visual cues for proper chin tuck form as patient tends to assume axial extension position. Good tolerance to addition of open books and thoracic rotation but LE/hip stabilization needed due to  rotation limitations.    Tex Is progressing well towards his goals.   Patient prognosis is Good.     Patient will continue to benefit from skilled outpatient physical therapy to address the deficits listed in the problem list box on initial evaluation, provide pt/family education and to maximize pt's level of independence in the home and community environment.     Patient's spiritual, cultural and educational needs considered and pt agreeable to plan of care and goals.     Anticipated barriers to physical therapy: age     Goals:  (ongoing)  Short Term Goals: 4 weeks   -Improve c/s AROM 3-5 deg for improved head turning.  -c/s strength grossly 4-/5 for improving head hold.  -Pt will report pain decreased by 25% for improving QOL.     Long Term Goals: 8 weeks   -Pt will report pain decreased by 35+% for improve pn and QOL.  -Pt will transition to Healthy Neck program or maintenance.    Plan     Plan of care Certification: 4/25/2024 to 6/17/2024.     Outpatient Physical Therapy 2 times weekly for 8 weeks to include the following interventions: Electrical Stimulation IFC, Manual Therapy, Moist Heat/ Ice, Neuromuscular Re-ed, Patient Education, Self Care, Therapeutic Activities, Therapeutic Exercise, Ultrasound, and dry needling.     Fernando Lynn, PTA

## 2024-05-24 ENCOUNTER — CLINICAL SUPPORT (OUTPATIENT)
Dept: REHABILITATION | Facility: HOSPITAL | Age: 69
End: 2024-05-24
Payer: COMMERCIAL

## 2024-05-24 DIAGNOSIS — R29.898 WEAKNESS OF BOTH SHOULDERS: ICD-10-CM

## 2024-05-24 DIAGNOSIS — R29.898 DECREASED RANGE OF MOTION OF NECK: Primary | ICD-10-CM

## 2024-05-24 PROCEDURE — 97110 THERAPEUTIC EXERCISES: CPT | Mod: PO,CQ

## 2024-05-24 PROCEDURE — 97112 NEUROMUSCULAR REEDUCATION: CPT | Mod: PO,CQ

## 2024-05-24 PROCEDURE — 97140 MANUAL THERAPY 1/> REGIONS: CPT | Mod: PO,CQ

## 2024-05-24 NOTE — PROGRESS NOTES
"  [facility]   Physical Therapy Treatment Note      Name: Tex Pickard III  Clinic Number: [id number]    Therapy Diagnosis:   Encounter Diagnoses   Name Primary?    Decreased range of motion of neck Yes    Weakness of both shoulders      Physician: Sam May*    Visit Date: 5/24/2024    Physician Orders: PT Eval and Treat   Post Surgical? No   Eval and Treat Yes   Type of Therapy Outpatient Therapy   Location: Neck      Medical Diagnosis from Referral:   M47.812 (ICD-10-CM) - Cervical facet syndrome   M54.2 (ICD-10-CM) - Myofascial neck pain      Evaluation Date: 4/25/2024  Authorization Period Expiration: 12/31/2024   Plan of Care Expiration: 6/14/2024  Progress Note Due: 5/24/2024  Date of Surgery: na  Visit # / Visits authorized: 1/ 1, 8/20   FOTO: 1/ 3 (4/25/2024)     Precautions: Standard      Time In: 8:03  Time Out: 9:00  Total Billable Time: 57 minutes    PTA Visit #: 1/5     Subjective     Patient reports: that his condition is improving and he continues to have less neck pn. He reports that his pn is mostly on the L side. .  Response to previous treatment: no adverse effect  Functional change: tbd    Pain: 2/10  Location: left neck       Objective      Objective Measures updated at progress report unless specified.     Treatment     Tex received the treatments listed below:      therapeutic exercises to develop strength, endurance, ROM, flexibility, and posture for 20 minutes including:  UBE 3'3' L2.0  Seated UPPER TRAP stretch 3 x 30"  Seated Levator Scapulae stretch 3 x 30"  Supine chin tuck 20x  Lat stretch with dowel 3 sec x 20  S/L open book x 10, 5 sec hold (PTA stabilizing hips)  Seated ext with towel 3 sec hold 20x    manual therapy techniques: Joint mobilizations, Myofacial release, and Soft tissue Mobilization were applied to the: bilateral neck and shoulders for 10 minutes, including:  STM passive stretch to B UT and Lev scap, manual intermittent " distraction -  STM to Uts and c/s paraspinals with ttp release-    NP today  Discussed the purpose, mechanism, and indications for dry needling with Tex . Patient was cleared of all precautions and contraindications and pt signed written consent and gave verbal consent to dry needling Rx today.   Palpation used to determine dry needling sites. Increased tone noted at bilateral c/s paraspinals. Pt rec'd dry needling in prone position to c/s paraspinals with 0.30 x 40 mm needles.  Pt tolerated treatment well and was not in any distress at the completion of treatment.      neuromuscular re-education activities to improve: Sense, Proprioception, and Posture for 30 minutes. The following activities were included:  Seated chin tuck, cues given to correct extension 2x10  No money x 20 green t-band  ISO retraction in sitting with green t-band 20x  Seated scap retraction 3 sec hold 20x  Head snags x 10 each 5 sec hold  Seated in chair, Hands resting on blue ball: thoracic rotation x 10  Scap depression with blue t-band 20x    therapeutic activities to improve functional performance for 0 minutes, includin    Patient Education and Home Exercises       Education provided:   - Cont HEP  - + Levator Scapulae stretch   - DN and consent    Written Home Exercises Provided: Patient instructed to cont prior HEP. Exercises were reviewed and Tex was able to demonstrate them prior to the end of the session.  Tex demonstrated good  understanding of the education provided. See Electronic Medical Record under Patient Instructions for exercises provided during therapy sessions    Assessment     Tex anette today's tx with progression of ther ex, neuromuscular re-ed and manual intervention well.He was able to progress with extension and scap depression exs w/o difficulty today. He was w/o c/o pn sx throughout tx. He demonstrates improving pn free ROM with exs. His cervical rotation has improved since start of skilled care . Fewer  tactile cues for proper stabilization of hips with open book ex. Improved thoracic rotation with VCs for proper form with ball rollout.    Tex Is progressing well towards his goals.   Patient prognosis is Good.     Patient will continue to benefit from skilled outpatient physical therapy to address the deficits listed in the problem list box on initial evaluation, provide pt/family education and to maximize pt's level of independence in the home and community environment.     Patient's spiritual, cultural and educational needs considered and pt agreeable to plan of care and goals.     Anticipated barriers to physical therapy: age     Goals:  (ongoing)  Short Term Goals: 4 weeks   -Improve c/s AROM 3-5 deg for improved head turning.  -c/s strength grossly 4-/5 for improving head hold.  -Pt will report pain decreased by 25% for improving QOL.     Long Term Goals: 8 weeks   -Pt will report pain decreased by 35+% for improve pn and QOL.  -Pt will transition to Healthy Neck program or maintenance.    Plan     Plan of care Certification: 4/25/2024 to 6/17/2024.     Outpatient Physical Therapy 2 times weekly for 8 weeks to include the following interventions: Electrical Stimulation IFC, Manual Therapy, Moist Heat/ Ice, Neuromuscular Re-ed, Patient Education, Self Care, Therapeutic Activities, Therapeutic Exercise, Ultrasound, and dry needling.     Fernando Lynn, PTA

## 2024-05-29 ENCOUNTER — CLINICAL SUPPORT (OUTPATIENT)
Dept: REHABILITATION | Facility: HOSPITAL | Age: 69
End: 2024-05-29
Payer: COMMERCIAL

## 2024-05-29 DIAGNOSIS — R29.898 DECREASED RANGE OF MOTION OF NECK: Primary | ICD-10-CM

## 2024-05-29 DIAGNOSIS — R29.898 WEAKNESS OF BOTH SHOULDERS: ICD-10-CM

## 2024-05-29 PROCEDURE — 97140 MANUAL THERAPY 1/> REGIONS: CPT | Mod: PO,CQ

## 2024-05-29 PROCEDURE — 97112 NEUROMUSCULAR REEDUCATION: CPT | Mod: PO,CQ

## 2024-05-29 PROCEDURE — 97110 THERAPEUTIC EXERCISES: CPT | Mod: PO,CQ

## 2024-05-29 NOTE — PROGRESS NOTES
"  [facility]   Physical Therapy Treatment Note      Name: Tex Pickard III  Clinic Number: [id number]    Therapy Diagnosis:   Encounter Diagnoses   Name Primary?    Decreased range of motion of neck Yes    Weakness of both shoulders      Physician: Sam May*    Visit Date: 5/29/2024    Physician Orders: PT Eval and Treat   Post Surgical? No   Eval and Treat Yes   Type of Therapy Outpatient Therapy   Location: Neck      Medical Diagnosis from Referral:   M47.812 (ICD-10-CM) - Cervical facet syndrome   M54.2 (ICD-10-CM) - Myofascial neck pain      Evaluation Date: 4/25/2024  Authorization Period Expiration: 12/31/2024   Plan of Care Expiration: 6/14/2024  Progress Note Due: 5/24/2024  Date of Surgery: na  Visit # / Visits authorized: 1/ 1, 9/20   FOTO: 1/ 3 (4/25/2024)     Precautions: Standard      Time In: 8:00  Time Out: 9:00  Total Billable Time: 60 minutes    PTA Visit #: 1/5     Subjective     Patient reports: that his neck is feeling better .  Response to previous treatment: no adverse effect  Functional change: tbd    Pain: 2/10  Location: left neck       Objective      Objective Measures updated at progress report unless specified.     Treatment     Tex received the treatments listed below:      therapeutic exercises to develop strength, endurance, ROM, flexibility, and posture for 20 minutes including:  UBE 3'3' L2.0  Seated UPPER TRAP stretch 3 x 30"  Seated Levator Scapulae stretch 3 x 30"  Supine chin tuck 20x  Lat stretch with dowel 3 sec x 20  S/L open book x 10, 5 sec hold   Seated ext with towel 30 sec hold 3x  Supine on towel roll vertical and horiz 3 min ea.     manual therapy techniques: Joint mobilizations, Myofacial release, and Soft tissue Mobilization were applied to the: bilateral neck and shoulders for 10 minutes, including:  STM passive stretch to B UT and Lev scap, manual intermittent distraction -  STM to Uts and c/s paraspinals with ttp " release-    NP today  Discussed the purpose, mechanism, and indications for dry needling with Tex . Patient was cleared of all precautions and contraindications and pt signed written consent and gave verbal consent to dry needling Rx today.   Palpation used to determine dry needling sites. Increased tone noted at bilateral c/s paraspinals. Pt rec'd dry needling in prone position to c/s paraspinals with 0.30 x 40 mm needles.  Pt tolerated treatment well and was not in any distress at the completion of treatment.      neuromuscular re-education activities to improve: Sense, Proprioception, and Posture for 30 minutes. The following activities were included:  Seated chin tuck, cues given to correct extension 2x10  No money x 20 green t-band  ISO retraction in sitting with green t-band 20x  Seated scap retraction 3 sec hold 20x  Head snags x 10 each 5 sec hold  Seated in chair, Hands resting on blue ball: thoracic rotation x 10  Scap depression with blue t-band 20x  Supine ABD red t-band 20x    therapeutic activities to improve functional performance for 0 minutes, includin    Patient Education and Home Exercises       Education provided:   - Cont HEP  - + Levator Scapulae stretch   - DN and consent    Written Home Exercises Provided: Patient instructed to cont prior HEP. Exercises were reviewed and Tex was able to demonstrate them prior to the end of the session.  Tex demonstrated good  understanding of the education provided. See Electronic Medical Record under Patient Instructions for exercises provided during therapy sessions    Assessment     Tex anette today's tx with progression of ther ex, neuromuscular re-ed and manual intervention well.He was able to progress with scap strengthening exs  and pec and thoracic stretching w/o difficulty today. He was w/o c/o pn sx throughout tx. He demonstrates improving pn free ROM with exs. His cervical rotation has improved since start of skilled care . Fewer tactile cues  for proper stabilization of hips with open book ex. Improved thoracic rotation with VCs for proper form with ball rollout.    Tex Is progressing well towards his goals.   Patient prognosis is Good.     Patient will continue to benefit from skilled outpatient physical therapy to address the deficits listed in the problem list box on initial evaluation, provide pt/family education and to maximize pt's level of independence in the home and community environment.     Patient's spiritual, cultural and educational needs considered and pt agreeable to plan of care and goals.     Anticipated barriers to physical therapy: age     Goals:  (ongoing)  Short Term Goals: 4 weeks   -Improve c/s AROM 3-5 deg for improved head turning.  -c/s strength grossly 4-/5 for improving head hold.  -Pt will report pain decreased by 25% for improving QOL.     Long Term Goals: 8 weeks   -Pt will report pain decreased by 35+% for improve pn and QOL.  -Pt will transition to Healthy Neck program or maintenance.    Plan     Plan of care Certification: 4/25/2024 to 6/17/2024.     Outpatient Physical Therapy 2 times weekly for 8 weeks to include the following interventions: Electrical Stimulation IFC, Manual Therapy, Moist Heat/ Ice, Neuromuscular Re-ed, Patient Education, Self Care, Therapeutic Activities, Therapeutic Exercise, Ultrasound, and dry needling.     Fernando Lynn, PTA

## 2024-06-05 ENCOUNTER — CLINICAL SUPPORT (OUTPATIENT)
Dept: REHABILITATION | Facility: HOSPITAL | Age: 69
End: 2024-06-05
Payer: COMMERCIAL

## 2024-06-05 DIAGNOSIS — R29.898 DECREASED RANGE OF MOTION OF NECK: Primary | ICD-10-CM

## 2024-06-05 DIAGNOSIS — R29.898 WEAKNESS OF BOTH SHOULDERS: ICD-10-CM

## 2024-06-05 PROCEDURE — 97112 NEUROMUSCULAR REEDUCATION: CPT | Mod: PO | Performed by: PHYSICAL THERAPIST

## 2024-06-05 PROCEDURE — 97110 THERAPEUTIC EXERCISES: CPT | Mod: PO | Performed by: PHYSICAL THERAPIST

## 2024-06-05 PROCEDURE — 97140 MANUAL THERAPY 1/> REGIONS: CPT | Mod: PO | Performed by: PHYSICAL THERAPIST

## 2024-06-05 NOTE — PROGRESS NOTES
[facility]   Reassessment: Physical Therapy Treatment Note      Name: Tex Pickard III  Clinic Number: [id number]    Therapy Diagnosis:   Encounter Diagnoses   Name Primary?    Decreased range of motion of neck Yes    Weakness of both shoulders      Physician: Sam May*    Visit Date: 6/5/2024    Physician Orders: PT Eval and Treat   Post Surgical? No   Eval and Treat Yes   Type of Therapy Outpatient Therapy   Location: Neck      Medical Diagnosis from Referral:   M47.812 (ICD-10-CM) - Cervical facet syndrome   M54.2 (ICD-10-CM) - Myofascial neck pain      Evaluation Date: 4/25/2024  Authorization Period Expiration: 12/31/2024   Plan of Care Expiration: 6/14/2024, 6/28/2024  Progress Note Due: 5/24/2024  Date of Surgery: na  Visit # / Visits authorized: 1/ 1, 10/20   FOTO: 2/ 3 (4/25/2024, 6/5/2024)     Precautions: Standard      Time In: 8:00  Time Out: 9:00  Total Billable Time: 58 minutes    PTA Visit #: 0/5     Subjective     Patient reports: that his neck had been feeling better but is back to hurting and tight again. Agreeable to recommended extensionof POC x 2 weeks for 3 weeks left.  Response to previous treatment: no adverse effect  Functional change: improving cervical spine AROM, posterior shoulder strength, improving QOL.    Pain: 2/10  Location: left neck       Objective      Objective Measures updated at progress report unless specified.     Observation: Little cervical AROM rot from trunk.     Posture: impaired     Cervical Range of Motion:     Degrees Pain   Flexion 34 +      Extension 40 +      Right Rotation nt nt      Left Rotation nt nt      Right Side Bending 30 +   Left Side Bending 30 +      Shoulder Range of Motion:   Shoulder Left Right   Flexion wnl wnl   Abduction wnl wnl   ER wnl wnl   IR wnl wnl      Strength:  Cervical MMT   Flexion 4/5   Extension 4+/5   Right Side Bend 4+/5   Left Side Bend 4-/5      Upper Extremity Strength  (R) UE   (L)  "UE     Shoulder flexion: 4+/5 Shoulder flexion: 4+/5   Shoulder Abduction: 4+/5 Shoulder abduction: 5/5   Shoulder ER 4+/5 Shoulder ER 4+/5   Shoulder IR 4/5 Shoulder IR 4/5   Elbow flexion: 5/5 Elbow flexion: 5/5   Elbow extension: 5/5 Elbow extension: 5/5   Lower Trap 4-/5 Lower Trap 4-/5   Middle Trap 4/5 Middle Trap 4/5   Rhomboids 5/5 Rhomboids 5/5      Special Tests:  Distraction -   Compression -   Spurlings -      Joint Mobility: hypo cervical downslides bilateral     Palpation: + bilateral UPPER TRAP and posterior scalenes     Sensation: intact bilateral UEs     Intake Outcome Measure for FOTO NECK Survey     Therapist reviewed FOTO scores for Tex Pickard III on 4/25/2024.   FOTO report - see Media section or FOTO account episode details.     Intake Score: 63  Goal: 65  6/5/2024: 58 (-5)     Treatment     Tex received the treatments listed below:      therapeutic exercises to develop strength, endurance, ROM, flexibility, and posture for 20 minutes including:  UBE 3'3' L2.0  reassessment    Np: reassessment.  Seated UPPER TRAP stretch 3 x 30"  Seated Levator Scapulae stretch 3 x 30"  Supine chin tuck 20x  Lat stretch with dowel 3 sec x 20  S/L open book x 10, 5 sec hold   Seated ext with towel 30 sec hold 3x  Supine on towel roll vertical and horiz 3 min ea.     manual therapy techniques: Joint mobilizations, Myofacial release, and Soft tissue Mobilization were applied to the: bilateral neck and shoulders for 25 minutes, including:  DTM passive stretch to B UT and Lev scap, manual intermittent distraction -  DTM to Uts and c/s paraspinals with ttp release    NP today  Discussed the purpose, mechanism, and indications for dry needling with Tex . Patient was cleared of all precautions and contraindications and pt signed written consent and gave verbal consent to dry needling Rx today.   Palpation used to determine dry needling sites. Increased tone noted at bilateral c/s paraspinals. Pt rec'd dry " needling in prone position to c/s paraspinals with 0.30 x 40 mm needles.  Pt tolerated treatment well and was not in any distress at the completion of treatment.      neuromuscular re-education activities to improve: Sense, Proprioception, and Posture for 15 minutes. The following activities were included:  Seated chin tuck, cues given to correct extension 2x10  No money x 20 green t-band  ISO retraction in sitting with green t-band 20x  Seated scap retraction 3 sec hold 20x    Prone Ys and Ts 2 x 10    Np: reassessment:  Head snags x 10 each 5 sec hold  Seated in chair, Hands resting on blue ball: thoracic rotation x 10  Scap depression with blue t-band 20x  Supine ABD red t-band 20x    therapeutic activities to improve functional performance for 0 minutes, includin    Patient Education and Home Exercises       Education provided:   - Cont HEP  - + Levator Scapulae stretch   - DN and consent    Written Home Exercises Provided: Patient instructed to cont prior HEP. Exercises were reviewed and Tex was able to demonstrate them prior to the end of the session.  Tex demonstrated good  understanding of the education provided. See Electronic Medical Record under Patient Instructions for exercises provided during therapy sessions    Assessment     Tex anette today's tx with progression of ther ex, neuromuscular re-ed and manual intervention well. He was able to progress with scap strengthening exs. He was w/o c/o pn sx throughout tx other than to palpation. He reports pain with cx rotation as with driving. He would like to cont working on this as his main goal. Cervical AROM, shoulder and neck strength have improved. Will extend POC by 2 weeks to focus on his remaining restrictions.    Tex Is progressing well towards his goals.   Patient prognosis is Good.     Patient will continue to benefit from skilled outpatient physical therapy to address the deficits listed in the problem list box on initial evaluation, provide  pt/family education and to maximize pt's level of independence in the home and community environment.     Patient's spiritual, cultural and educational needs considered and pt agreeable to plan of care and goals.     Anticipated barriers to physical therapy: age     Goals:  (ongoing)  Short Term Goals: 4 weeks   -Improve c/s AROM 3-5 deg for improved head turning.-MET  -c/s strength grossly 4-/5 for improving head hold.-MET  -Pt will report pain decreased by 25% for improving QOL.     Long Term Goals: 8 weeks   -Pt will report pain decreased by 35+% for improve pn and QOL.  -Pt will transition to Healthy Neck program or maintenance.    Plan     Plan of care Certification: 4/25/2024 to 6/17/2024.     Outpatient Physical Therapy 2 times weekly for 8 weeks to include the following interventions: Electrical Stimulation IFC, Manual Therapy, Moist Heat/ Ice, Neuromuscular Re-ed, Patient Education, Self Care, Therapeutic Activities, Therapeutic Exercise, Ultrasound, and dry needling.     Edita Matthews, PT

## 2024-06-06 NOTE — PLAN OF CARE
[facility]  Physical Therapy Plan of Care Note     Name: Tex Pickard III  Clinic Number: [id number]    Therapy Diagnosis:   Encounter Diagnoses   Name Primary?    Decreased range of motion of neck Yes    Weakness of both shoulders      Physician: Sam May*    Visit Date: 6/5/2024    Physician Orders: PT Eval and Treat   Post Surgical? No   Eval and Treat Yes   Type of Therapy Outpatient Therapy   Location: Neck      Medical Diagnosis from Referral:   M47.812 (ICD-10-CM) - Cervical facet syndrome   M54.2 (ICD-10-CM) - Myofascial neck pain      Evaluation Date: 4/25/2024  Authorization Period Expiration:  12/31/2024   Plan of Care Expiration: 6/14/2024  Progress Note Due: 5/24/2024  Date of Surgery: na  Visit # / Visits authorized: 1/ 1   FOTO: 1/ 3 (4/25/2024)     Precautions: Standard   Functional Level Prior to Evaluation:  impaired neck ROM for driving, pain    SUBJECTIVE     Update:   Patient reports: that his neck had been feeling better but is back to hurting and tight again. Agreeable to recommended extensionof POC x 2 weeks for 3 weeks left.  Response to previous treatment: no adverse effect  Functional change: improving cervical spine AROM, posterior shoulder strength, improving QOL.     Pain: 2/10  Location: left neck       OBJECTIVE     Update:   Objective Measures updated at progress report unless specified.      Observation: Little cervical AROM rot from trunk.     Posture: impaired     Cervical Range of Motion:     Degrees Pain   Flexion 34 +      Extension 40 +      Right Rotation nt nt      Left Rotation nt nt      Right Side Bending 30 +   Left Side Bending 30 +      Shoulder Range of Motion:   Shoulder Left Right   Flexion wnl wnl   Abduction wnl wnl   ER wnl wnl   IR wnl wnl      Strength:  Cervical MMT   Flexion 4/5   Extension 4+/5   Right Side Bend 4+/5   Left Side Bend 4-/5      Upper Extremity Strength  (R) UE   (L) UE     Shoulder flexion: 4+/5  Shoulder flexion: 4+/5   Shoulder Abduction: 4+/5 Shoulder abduction: 5/5   Shoulder ER 4+/5 Shoulder ER 4+/5   Shoulder IR 4/5 Shoulder IR 4/5   Elbow flexion: 5/5 Elbow flexion: 5/5   Elbow extension: 5/5 Elbow extension: 5/5   Lower Trap 4-/5 Lower Trap 4-/5   Middle Trap 4/5 Middle Trap 4/5   Rhomboids 5/5 Rhomboids 5/5      Special Tests:  Distraction -   Compression -   Spurlings -      Joint Mobility: hypo cervical downslides bilateral     Palpation: + bilateral UPPER TRAP and posterior scalenes     Sensation: intact bilateral UEs     Intake Outcome Measure for FOTO NECK Survey     Therapist reviewed FOTO scores for Tex Pickard III on 4/25/2024.   FOTO report - see Media section or FOTO account episode details.     Intake Score: 63  Goal: 65  6/5/2024: 58 (-5)        ASSESSMENT     Update:   Tex anette today's tx with progression of ther ex, neuromuscular re-ed and manual intervention well. He was able to progress with scap strengthening exs. He was w/o c/o pn sx throughout tx other than to palpation. He reports pain with cx rotation as with driving. He would like to cont working on this as his main goal. Cervical AROM, shoulder and neck strength have improved. Will extend POC by 2 weeks to focus on his remaining restrictions.     Tex Is progressing well towards his goals.   Patient prognosis is Good.      Patient will continue to benefit from skilled outpatient physical therapy to address the deficits listed in the problem list box on initial evaluation, provide pt/family education and to maximize pt's level of independence in the home and community environment.      Patient's spiritual, cultural and educational needs considered and pt agreeable to plan of care and goals.     Anticipated barriers to physical therapy: age     Previous Short Term Goals Status:     Short Term Goals: 4 weeks   -Improve c/s AROM 3-5 deg for improved head turning.-MET  -c/s strength grossly 4-/5 for improving head hold.-MET  -Pt  will report pain decreased by 25% for improving QOL.  New Short Term Goals Status:   Continue prior.  Long Term Goal Status: continue per initial plan of care.  Long Term Goals: 8 weeks   -Pt will report pain decreased by 35+% for improve pn and QOL.  -Pt will transition to Healthy Neck program or maintenance.  Reasons for Recertification of Therapy:   Pt is progressing in ROM and strength    GOALS  Short Term Goals: 4 weeks   -Improve c/s AROM 3-5 deg for improved head turning.-MET  -c/s strength grossly 4-/5 for improving head hold.-MET  -Pt will report pain decreased by 25% for improving QOL.     Long Term Goals: 8 weeks   -Pt will report pain decreased by 35+% for improve pn and QOL.  -Pt will transition to Healthy Neck program or maintenance.    PLAN     Updated Certification Period: 6/5/2024 to 6/28/2024   Recommended Treatment Plan: 1-2 times per week for 3 weeks:  Cervical/Lumbar Traction, Electrical Stimulation IFC, Manual Therapy, Moist Heat/ Ice, Neuromuscular Re-ed, Patient Education, Therapeutic Activities, Therapeutic Exercise, and dry needling  Other Recommendations: kathy Matthews, PT

## 2024-06-10 ENCOUNTER — CLINICAL SUPPORT (OUTPATIENT)
Dept: REHABILITATION | Facility: HOSPITAL | Age: 69
End: 2024-06-10
Payer: COMMERCIAL

## 2024-06-10 DIAGNOSIS — R29.898 WEAKNESS OF BOTH SHOULDERS: ICD-10-CM

## 2024-06-10 DIAGNOSIS — R29.898 DECREASED RANGE OF MOTION OF NECK: Primary | ICD-10-CM

## 2024-06-10 PROCEDURE — 97140 MANUAL THERAPY 1/> REGIONS: CPT | Mod: PO,CQ

## 2024-06-10 PROCEDURE — 97112 NEUROMUSCULAR REEDUCATION: CPT | Mod: PO,CQ

## 2024-06-10 PROCEDURE — 97110 THERAPEUTIC EXERCISES: CPT | Mod: PO,CQ

## 2024-06-10 NOTE — PROGRESS NOTES
"  [facility]   Physical Therapy Treatment Note      Name: Tex Pickard III  Clinic Number: [id number]    Therapy Diagnosis:   Encounter Diagnoses   Name Primary?    Decreased range of motion of neck Yes    Weakness of both shoulders      Physician: Sam May*    Visit Date: 6/10/2024    Physician Orders: PT Eval and Treat   Post Surgical? No   Eval and Treat Yes   Type of Therapy Outpatient Therapy   Location: Neck      Medical Diagnosis from Referral:   M47.812 (ICD-10-CM) - Cervical facet syndrome   M54.2 (ICD-10-CM) - Myofascial neck pain      Evaluation Date: 4/25/2024  Authorization Period Expiration: 12/31/2024   Plan of Care Expiration: 6/14/2024, 6/28/2024  Progress Note Due: 5/24/2024  Date of Surgery: na  Visit # / Visits authorized: 1/ 1, 11/20   FOTO: 2/ 3 (4/25/2024, 6/5/2024)     Precautions: Standard      Time In: 1504  Time Out: 1554  Total Billable Time: 40 minutes (1:1 with PTA and tech)    PTA Visit #: 1/5     Subjective     Patient reports: his neck is feeling slightly better but he continues with pain on the left side. Patient states he is noticing some improvement in ROM when turning.   Response to previous treatment: no adverse effect  Functional change: improving cervical spine AROM, posterior shoulder strength, improving QOL.    Pain: 2/10  Location: left neck       Objective      Objective Measures updated at progress report unless specified.     Observation: Little cervical AROM rot from trunk.     Treatment     Tex received the treatments listed below:      therapeutic exercises to develop strength, endurance, ROM, flexibility, and posture for 20 minutes including:  UBE 3'3' L2.0  Seated UPPER TRAP stretch 3 x 30" (NP)  Seated Levator Scapulae stretch 3 x 30" (NP)  Supine chin tuck 20x  Lat stretch with dowel 3 sec x 20 (NP)  S/L open book x 10, 5 sec hold   Seated: half foam roll at thoracic spine + shoulder flexion with dowel 2x10  Seated: ball " roll outs x 10  Seated: ball roll out + thoracic rotation x 10    manual therapy techniques: Joint mobilizations, Myofacial release, and Soft tissue Mobilization were applied to the: bilateral neck and shoulders for 10 minutes, including:  DTM passive stretch to B UT and Lev scap, manual intermittent distraction -  DTM to Uts and c/s paraspinals with ttp release    NP today  Discussed the purpose, mechanism, and indications for dry needling with Tex . Patient was cleared of all precautions and contraindications and pt signed written consent and gave verbal consent to dry needling Rx today.   Palpation used to determine dry needling sites. Increased tone noted at bilateral c/s paraspinals. Pt rec'd dry needling in prone position to c/s paraspinals with 0.30 x 40 mm needles.  Pt tolerated treatment well and was not in any distress at the completion of treatment.      neuromuscular re-education activities to improve: Sense, Proprioception, and Posture for 15 minutes. The following activities were included:  Seated chin tuck, cues given to correct extension 2x10  No money x 20 green t-band  ISO retraction in sitting with green t-band 20x  Prone Ys and Ts 2 x 10  Head snags x 10 each 5 sec hold  Seated ABD green t-band 20x    therapeutic activities to improve functional performance for 0 minutes, includin    Patient Education and Home Exercises       Education provided:   - Cont HEP  - + Levator Scapulae stretch   - DN and consent    Written Home Exercises Provided: Patient instructed to cont prior HEP. Exercises were reviewed and Tex was able to demonstrate them prior to the end of the session.  Tex demonstrated good  understanding of the education provided. See Electronic Medical Record under Patient Instructions for exercises provided during therapy sessions    Assessment     (L) sided cervical pain with (L) cervical rotation. Firm end feel with (L) cervical rotation. Patient does respond well to combination  cervical and thoracic mobility focused exercises with improvements in rotational motion achieved. Continue stabilization exercises without adverse effects.     Tex Is progressing well towards his goals.   Patient prognosis is Good.     Patient will continue to benefit from skilled outpatient physical therapy to address the deficits listed in the problem list box on initial evaluation, provide pt/family education and to maximize pt's level of independence in the home and community environment.     Patient's spiritual, cultural and educational needs considered and pt agreeable to plan of care and goals.     Anticipated barriers to physical therapy: age     Goals:  (ongoing)  Short Term Goals: 4 weeks   -Improve c/s AROM 3-5 deg for improved head turning.-MET  -c/s strength grossly 4-/5 for improving head hold.-MET  -Pt will report pain decreased by 25% for improving QOL.     Long Term Goals: 8 weeks   -Pt will report pain decreased by 35+% for improve pn and QOL.  -Pt will transition to Healthy Neck program or maintenance.    Plan     Plan of care Certification: 4/25/2024 to 6/17/2024.     Outpatient Physical Therapy 2 times weekly for 8 weeks to include the following interventions: Electrical Stimulation IFC, Manual Therapy, Moist Heat/ Ice, Neuromuscular Re-ed, Patient Education, Self Care, Therapeutic Activities, Therapeutic Exercise, Ultrasound, and dry needling.     Sofiya Card, PTA

## 2024-06-11 NOTE — PROGRESS NOTES
"  [facility]   Physical Therapy Treatment Note      Name: Tex Pickard III  Clinic Number: [id number]    Therapy Diagnosis:   Encounter Diagnoses   Name Primary?    Decreased range of motion of neck Yes    Weakness of both shoulders      Physician: Sam May*    Visit Date: 6/12/2024    Physician Orders: PT Eval and Treat   Post Surgical? No   Eval and Treat Yes   Type of Therapy Outpatient Therapy   Location: Neck      Medical Diagnosis from Referral:   M47.812 (ICD-10-CM) - Cervical facet syndrome   M54.2 (ICD-10-CM) - Myofascial neck pain      Evaluation Date: 4/25/2024  Authorization Period Expiration: 12/31/2024   Plan of Care Expiration: 6/14/2024, 6/28/2024  Progress Note Due: 5/24/2024  Date of Surgery: na  Visit # / Visits authorized: 1/ 1, 12/20   FOTO: 2/ 3 (4/25/2024, 6/5/2024)     Precautions: Standard      Time In: 800  Time Out: 900  Total Billable Time: 56 minutes     PTA Visit #: 0/5     Subjective     Patient reports: Left side UPPER TRAP and Levator Scapulae pain at upper c/s.  Response to previous treatment: no adverse effect  Functional change: improving cervical spine AROM, posterior shoulder strength, improving QOL.    Pain: 2/10  Location: left neck       Objective      Objective Measures updated at progress report unless specified.     Observation: Little cervical AROM rot from trunk.     Treatment     Tex received the treatments listed below:      therapeutic exercises to develop strength, endurance, ROM, flexibility, and posture for 20 minutes including:  UBE 3'3' L2.0  Seated UPPER TRAP stretch 3 x 30" (NP)  Seated Levator Scapulae stretch 3 x 30" (NP)  Supine chin tuck 20x  Lat stretch with dowel 3 sec x 20 (NP)  S/L open book x 10, 5 sec hold with head turn  Seated: half foam roll at thoracic spine + shoulder flexion with dowel 2x10-np  Seated: ball roll outs x 10-np  Seated: ball roll out + thoracic rotation x 10-np    manual therapy " techniques: Joint mobilizations, Myofacial release, and Soft tissue Mobilization were applied to the: bilateral neck and shoulders for 15 minutes, including:  DTM passive stretch to B UT and Lev scap, manual intermittent distraction   DTM to Uts and c/s paraspinals with ttp release  Massage uplift  C2 backward bending mob    NP today  Discussed the purpose, mechanism, and indications for dry needling with Tex . Patient was cleared of all precautions and contraindications and pt signed written consent and gave verbal consent to dry needling Rx today.   Palpation used to determine dry needling sites. Increased tone noted at bilateral c/s paraspinals. Pt rec'd dry needling in prone position to c/s paraspinals with 0.30 x 40 mm needles.  Pt tolerated treatment well and was not in any distress at the completion of treatment.      neuromuscular re-education activities to improve: Sense, Proprioception, and Posture for 23 minutes. The following activities were included:  Seated chin tuck, cues given to correct extension 2x10-np  No money x 20 blue t-band  ISO retraction in sitting with green t-band 20x  Prone Ys and Ts 2 x 10  Head snags x 10 each 5 sec hold  Seated ABD green t-band 20x  Xs gtb 2 x 10 each    therapeutic activities to improve functional performance for 0 minutes, includin    Patient Education and Home Exercises       Education provided:   - Cont HEP  - + Levator Scapulae stretch   - DN and consent    Written Home Exercises Provided: Patient instructed to cont prior HEP. Exercises were reviewed and Tex was able to demonstrate them prior to the end of the session.  Tex demonstrated good  understanding of the education provided. See Electronic Medical Record under Patient Instructions for exercises provided during therapy sessions    Assessment     (L) sided cervical pain with (L) cervical rotation. Firm end feel with (L) cervical rotation. Patient does respond well to combination cervical and  thoracic mobility focused exercises with improvements in rotational motion achieved. Additional MT used to facilitate ROM, but likely d/c last week of visits. Will assess improvements. Continue stabilization exercises without adverse effects.     Tex Is progressing well towards his goals.   Patient prognosis is Good.     Patient will continue to benefit from skilled outpatient physical therapy to address the deficits listed in the problem list box on initial evaluation, provide pt/family education and to maximize pt's level of independence in the home and community environment.     Patient's spiritual, cultural and educational needs considered and pt agreeable to plan of care and goals.     Anticipated barriers to physical therapy: age     Goals:  (ongoing)  Short Term Goals: 4 weeks   -Improve c/s AROM 3-5 deg for improved head turning.-MET  -c/s strength grossly 4-/5 for improving head hold.-MET  -Pt will report pain decreased by 25% for improving QOL.     Long Term Goals: 8 weeks   -Pt will report pain decreased by 35+% for improve pn and QOL.  -Pt will transition to Healthy Neck program or maintenance.    Plan     Plan of care Certification: 4/25/2024 to 6/17/2024.     Outpatient Physical Therapy 2 times weekly for 8 weeks to include the following interventions: Electrical Stimulation IFC, Manual Therapy, Moist Heat/ Ice, Neuromuscular Re-ed, Patient Education, Self Care, Therapeutic Activities, Therapeutic Exercise, Ultrasound, and dry needling.     Edita Matthews, PT

## 2024-06-12 ENCOUNTER — CLINICAL SUPPORT (OUTPATIENT)
Dept: REHABILITATION | Facility: HOSPITAL | Age: 69
End: 2024-06-12
Payer: COMMERCIAL

## 2024-06-12 DIAGNOSIS — R29.898 DECREASED RANGE OF MOTION OF NECK: Primary | ICD-10-CM

## 2024-06-12 DIAGNOSIS — R29.898 WEAKNESS OF BOTH SHOULDERS: ICD-10-CM

## 2024-06-12 PROCEDURE — 97110 THERAPEUTIC EXERCISES: CPT | Mod: PO | Performed by: PHYSICAL THERAPIST

## 2024-06-12 PROCEDURE — 97140 MANUAL THERAPY 1/> REGIONS: CPT | Mod: PO | Performed by: PHYSICAL THERAPIST

## 2024-06-12 PROCEDURE — 97112 NEUROMUSCULAR REEDUCATION: CPT | Mod: PO | Performed by: PHYSICAL THERAPIST

## 2024-08-09 ENCOUNTER — TELEPHONE (OUTPATIENT)
Dept: PAIN MEDICINE | Facility: CLINIC | Age: 69
End: 2024-08-09

## 2024-08-09 ENCOUNTER — HOSPITAL ENCOUNTER (OUTPATIENT)
Dept: RADIOLOGY | Facility: HOSPITAL | Age: 69
Discharge: HOME | End: 2024-08-09
Attending: ANESTHESIOLOGY
Payer: COMMERCIAL

## 2024-08-09 ENCOUNTER — OFFICE VISIT (OUTPATIENT)
Dept: PAIN MEDICINE | Facility: CLINIC | Age: 69
End: 2024-08-09
Payer: COMMERCIAL

## 2024-08-09 VITALS — SYSTOLIC BLOOD PRESSURE: 145 MMHG | HEART RATE: 75 BPM | DIASTOLIC BLOOD PRESSURE: 88 MMHG

## 2024-08-09 DIAGNOSIS — M54.2 CERVICALGIA: ICD-10-CM

## 2024-08-09 DIAGNOSIS — M47.812 CERVICAL SPONDYLOSIS: Primary | ICD-10-CM

## 2024-08-09 DIAGNOSIS — M79.18 MYOFASCIAL PAIN: ICD-10-CM

## 2024-08-09 DIAGNOSIS — M47.812 CERVICAL SPONDYLOSIS: ICD-10-CM

## 2024-08-09 PROCEDURE — 1159F MED LIST DOCD IN RCRD: CPT | Mod: CPTII,S$GLB,, | Performed by: ANESTHESIOLOGY

## 2024-08-09 PROCEDURE — 3288F FALL RISK ASSESSMENT DOCD: CPT | Mod: CPTII,S$GLB,, | Performed by: ANESTHESIOLOGY

## 2024-08-09 PROCEDURE — 1160F RVW MEDS BY RX/DR IN RCRD: CPT | Mod: CPTII,S$GLB,, | Performed by: ANESTHESIOLOGY

## 2024-08-09 PROCEDURE — 4010F ACE/ARB THERAPY RXD/TAKEN: CPT | Mod: CPTII,S$GLB,, | Performed by: ANESTHESIOLOGY

## 2024-08-09 PROCEDURE — 99204 OFFICE O/P NEW MOD 45 MIN: CPT | Mod: S$GLB,,, | Performed by: ANESTHESIOLOGY

## 2024-08-09 PROCEDURE — 99999 PR PBB SHADOW E&M-EST. PATIENT-LVL III: CPT | Mod: PBBFAC,,, | Performed by: ANESTHESIOLOGY

## 2024-08-09 PROCEDURE — 1101F PT FALLS ASSESS-DOCD LE1/YR: CPT | Mod: CPTII,S$GLB,, | Performed by: ANESTHESIOLOGY

## 2024-08-09 PROCEDURE — 3079F DIAST BP 80-89 MM HG: CPT | Mod: CPTII,S$GLB,, | Performed by: ANESTHESIOLOGY

## 2024-08-09 PROCEDURE — 1125F AMNT PAIN NOTED PAIN PRSNT: CPT | Mod: CPTII,S$GLB,, | Performed by: ANESTHESIOLOGY

## 2024-08-09 PROCEDURE — 3044F HG A1C LEVEL LT 7.0%: CPT | Mod: CPTII,S$GLB,, | Performed by: ANESTHESIOLOGY

## 2024-08-09 PROCEDURE — 3077F SYST BP >= 140 MM HG: CPT | Mod: CPTII,S$GLB,, | Performed by: ANESTHESIOLOGY

## 2024-08-09 PROCEDURE — 72052 X-RAY EXAM NECK SPINE 6/>VWS: CPT | Mod: 26,,, | Performed by: RADIOLOGY

## 2024-08-09 PROCEDURE — 72052 X-RAY EXAM NECK SPINE 6/>VWS: CPT | Mod: TC,PO

## 2024-08-09 RX ORDER — CYCLOBENZAPRINE HCL 5 MG
5 TABLET ORAL 2 TIMES DAILY PRN
Qty: 20 TABLET | Refills: 0 | Status: SHIPPED | OUTPATIENT
Start: 2024-08-09

## 2024-08-09 NOTE — TELEPHONE ENCOUNTER
Physician - Dr Hsu    Type of Procedure/Injection - Cervical Medial Branch Block C3/4 and C4/5          Laterality - Bilateral      Anxiolysis- RNIV      Need to hold medication - Yes      Aspirin for 7 days      Clearance needed - No      Follow up - phone call next day

## 2024-08-09 NOTE — H&P (VIEW-ONLY)
[facility] New Patient Evaluation      Referred by: Lakeisha Hylton    PCP:     CC:   Chief Complaint   Patient presents with    Neck Pain          8/9/2024     1:19 PM   Last 3 PDI Scores   Pain Disability Index (PDI) 27         HPI:   Tex Pickard III is a 68 y.o. male patient who has a past medical history of Arthritis, Colon polyps, History of colon polyps, Hypertension, Renal disorder, and Sleep apnea. He presents with neck pain.  He has a chronic history of neck pain for over the past 3 years.  Today he reports bilateral axial neck pain, constant, sharp, shooting, aching, 6/10.  He denies any radicular pain.  No numbness or weakness.  Pain is worse with neck rotation and neck extension      Pain Intervention History:      Past Spine Surgical History:      Past and current medications:  Antineuropathics:  NSAIDs: mobic  Physical therapy: yes, completed   Antidepressants:  Muscle relaxers:  Opioids:  Antiplatelets/Anticoagulants: aspirin     History:    Current Outpatient Medications:     aspirin 81 MG Chew, Take 81 mg by mouth once daily., Disp: , Rfl:     BYSTOLIC 20 mg Tab, 20 mg. , Disp: , Rfl:     cholecalciferol, vitamin D3, 125 mcg (5,000 unit) capsule, Take 1 capsule (5,000 Units total) by mouth twice a week., Disp: , Rfl:     cyanocobalamin, vitamin B-12, (VITAMIN B-12) 2,500 mcg Subl, Place 2,500 mcg under the tongue every other day., Disp: , Rfl: 0    finasteride (PROPECIA) 1 mg tablet, Take 1 mg by mouth once daily., Disp: , Rfl: 3    gluc willis/chondro willis A/vit C/Mn (GLUCOSAMINE 1500 COMPLEX ORAL), Take by mouth., Disp: , Rfl:     losartan (COZAAR) 25 MG tablet, , Disp: , Rfl:     meloxicam (MOBIC) 15 MG tablet, Take 1 tablet (15 mg total) by mouth once daily., Disp: 30 tablet, Rfl: 2    multivitamin capsule, Take 1 capsule by mouth once daily., Disp: , Rfl:     omeprazole (PRILOSEC) 40 MG capsule, Take 1 capsule (40 mg total) by mouth once daily., Disp: 30 capsule, Rfl: 11    ondansetron  (ZOFRAN-ODT) 8 MG TbDL, Take 1 tablet (8 mg total) by mouth 3 (three) times daily., Disp: 30 tablet, Rfl: 1    oxymetazoline (AFRIN) 0.05 % nasal spray, 2 sprays by Nasal route nightly as needed. , Disp: , Rfl:     pravastatin (PRAVACHOL) 10 MG tablet, Take 10 mg by mouth once daily. , Disp: , Rfl:     SLOW RELEASE IRON 144 mg (45 mg iron) TbSR, Take 1 tablet with vitamin C 250 mg daily, Disp: , Rfl:     testosterone cypionate (DEPOTESTOTERONE CYPIONATE) 200 mg/mL injection, 0.8 ml injection weekly, Disp: 10 mL, Rfl: 5    cyclobenzaprine (FLEXERIL) 5 MG tablet, Take 1 tablet (5 mg total) by mouth 2 (two) times daily as needed for Muscle spasms., Disp: 20 tablet, Rfl: 0    gabapentin (NEURONTIN) 600 MG tablet, Take 600 mg by mouth 2 (two) times daily., Disp: , Rfl:   No current facility-administered medications for this visit.    Facility-Administered Medications Ordered in Other Visits:     diphenhydrAMINE injection 25 mg, 25 mg, Intravenous, Q6H PRN, Case Juarez MD    HYDROmorphone injection 0.5 mg, 0.5 mg, Intravenous, Q5 Min PRN, Case Juarez MD, 0.5 mg at 10/26/21 1235    lorazepam injection 0.25 mg, 0.25 mg, Intravenous, Once PRN, Case Juarez MD    ondansetron injection 4 mg, 4 mg, Intravenous, Daily PRN, Case Juarez MD    sodium chloride 0.9% bolus 250 mL, 250 mL, Intravenous, Once, Case Juarez MD    Past Medical History:   Diagnosis Date    Arthritis     Colon polyps     History of colon polyps 4/14/2014    Hypertension     Renal disorder     Sleep apnea     wears CPAP       Past Surgical History:   Procedure Laterality Date    CATARACT EXTRACTION W/  INTRAOCULAR LENS IMPLANT      curt    COLONOSCOPY N/A 10/7/2019    Procedure: COLONOSCOPY;  Surgeon: To Martinez Jr., MD;  Location: [facility];  Service: Endoscopy;  Laterality: N/A;    COLONOSCOPY W/ POLYPECTOMY  9/26/2008  Juan    One 1 to 2 mm polyp in the hepatic flexure.  TUBULAR ADENOMA.   Redundant colon.    "Otherwise, the colon and term ileum is normal.     EYE SURGERY      cataract OU    EYE SURGERY Bilateral     eye lid    GASTRIC BYPASS  1999    LIPOSUCTION  2011    plastic surgery - abdominal dermolipectomy ("tummy tuck")    TRANSURETHRAL SURGICAL REMOVAL OF PROSTATE (TURP) USING GREEN LIGHT LASER N/A 10/26/2021    Procedure: TURP, USING GREEN LIGHT LASER;  Surgeon: Mikhail Owens MD;  Location: [facility] OR;  Service: Urology;  Laterality: N/A;    TRIGGER FINGER RELEASE Right 11/12/2020    Procedure: RELEASE, TRIGGER FINGER;  Surgeon: Mauricio Maravilla MD;  Location: [facility] OR;  Service: Orthopedics;  Laterality: Right;  Procedure:  Right middle finger trigger release    Position:  Supine    Anesthesia:  Local    Equipment:  Basic handset    VASECTOMY         Family History   Problem Relation Name Age of Onset    Diabetes Father      Heart disease Father          CAD stents    Hypertension Mother      Dementia Mother          early 70's       Social History     Socioeconomic History    Marital status:    Occupational History    Occupation: contractor   Tobacco Use    Smoking status: Former     Types: Cigarettes    Smokeless tobacco: Former   Substance and Sexual Activity    Alcohol use: No    Drug use: No     Social Determinants of Health     Financial Resource Strain: Low Risk  (4/17/2023)    Overall Financial Resource Strain (CARDIA)     Difficulty of Paying Living Expenses: Not hard at all   Food Insecurity: No Food Insecurity (4/17/2023)    Hunger Vital Sign     Worried About Running Out of Food in the Last Year: Never true     Ran Out of Food in the Last Year: Never true   Transportation Needs: Patient Declined (4/17/2023)    PRAPARE - Transportation     Lack of Transportation (Medical): Patient declined     Lack of Transportation (Non-Medical): Patient declined   Physical Activity: Sufficiently Active (4/17/2023)    Exercise Vital Sign     Days of Exercise per Week: 3 days     Minutes of Exercise per " Session: 60 min   Stress: No Stress Concern Present (4/17/2023)    [employer] [place] [employer] - Occupational Stress Questionnaire     Feeling of Stress : Not at all   Housing Stability: Low Risk  (4/17/2023)    Housing Stability Vital Sign     Unable to Pay for Housing in the Last Year: No     Number of Places Lived in the Last Year: 1     Unstable Housing in the Last Year: No       Review of patient's allergies indicates:   Allergen Reactions    Cefaclor Hives     ceclor    Codeine Hives       Review of Systems:  12 point review of systems is negative.    Physical Exam:  Vitals:    08/09/24 1320   BP: (!) 145/88   Pulse: 75   PainSc:   5   PainLoc: Neck     There is no height or weight on file to calculate BMI.    Gen: NAD  Psych: mood appropriate for given condition  HEENT: eyes anicteric   CV: RRR  HEENT: anicteric   Respiratory: non-labored, no signs of respiratory distress  Abd: non-distended  Skin: warm, dry and intact.  Gait: No antalgic gait.     Reproducible pain with cervical axial facet loading    Sensory:  Intact and symmetrical to light touch in C4-T1 dermatomes bilaterally.     Motor:    Right Left   C4 Shoulder Abduction  5  5   C5 Elbow Flexion    5  5   C6 Wrist Extension  5  5   C7 Elbow Extension   5  5   C8/T1 Hand Intrinsics   5  5      Right Left   Triceps DTR 1+ 1+   Biceps DTR 0 0        Patellar DTR 1+ 1+        Ryan Absent  Absent                 Labs:  Lab Results   Component Value Date    HGBA1C 6.5 (H) 04/09/2024       Lab Results   Component Value Date    WBC 4.54 04/09/2024    HGB 14.1 04/09/2024    HCT 43.2 04/09/2024    MCV 94 04/09/2024     (L) 04/09/2024           Imaging:  Xray cervical spine 8/9/24  FINDINGS:  The C7 vertebral body is obscured on the lateral view but grossly normal on the swimmer's view.  The vertebral bodies maintain normal height.  There is no fracture.  Alignment is grossly normal.  The odontoid process is intact.  There is large  anterior osteophyte formation from the C3-4 through C6-7 levels.  There is disc space narrowing most apparent at the C5-6 and C6-7 levels.  There is multilevel bony foraminal stenosis.  There is no abnormal excursion demonstrated with flexion or extension.    Assessment:   Problem List Items Addressed This Visit    None  Visit Diagnoses       Cervical spondylosis    -  Primary    Relevant Orders    X-Ray Cervical Spine 5 View With Flex And Ext (Completed)    Cervicalgia        Relevant Orders    X-Ray Cervical Spine 5 View With Flex And Ext (Completed)    Myofascial pain        Relevant Medications    cyclobenzaprine (FLEXERIL) 5 MG tablet              Tex Pickard III is a 68 y.o. male patient who has a past medical history of Arthritis, Colon polyps, History of colon polyps, Hypertension, Renal disorder, and Sleep apnea. He presents with neck pain.  He has a chronic history of neck pain for over the past 3 years.  Today he reports bilateral axial neck pain, constant, sharp, shooting, aching, 6/10.  He denies any radicular pain.  No numbness or weakness.  Pain is worse with neck rotation and neck extension    - on exam he has full strength in his upper extremities and intact sensation to light touch bilateral C4-T1.  He has reproducible pain with cervical axial facet  - cervical x-rays consistent with multilevel bilateral facet arthropathy  - over the past 8 weeks he has been participating in formal physical therapy however he continues to have axial neck pain that is limiting his mobility and interfering with the quality of life  - I think his pain is secondary to cervical spondylosis.  He may also have a myofascial component of his pain.  He failed to get relief with dry needling at PT  - I have prescribed him some Flexeril to use 1 to 2 times a day as needed for myofascial pain.  He understands side effects include drowsiness  - his pain is limiting his mobility and interfering with the quality of his life.  -  we will schedule 1st diagnostic bilateral C3-4 and C4-5 medial branch blocks.  The risks and benefits of this intervention, and alternative therapies were discussed with the patient.  The discussion of risks included infection, bleeding, need for additional procedures or surgery, nerve damage.  Questions regarding the procedure, risks, expected outcome, and possible side effects were solicited and answered to the patient's satisfaction.  Tex Pickard wishes to proceed with the injection or procedure.  Written consent was obtained.  - if he fails to get significant improvement we will get a cervical MRI      : Not applicable    Augie Hsu M.D.  Interventional Pain Medicine / Anesthesiology    This note was completed with dictation software and grammatical errors may exist.

## 2024-08-09 NOTE — PROGRESS NOTES
[facility] New Patient Evaluation      Referred by: Lakeisha Hylton    PCP:     CC:   Chief Complaint   Patient presents with    Neck Pain          8/9/2024     1:19 PM   Last 3 PDI Scores   Pain Disability Index (PDI) 27         HPI:   Tex Pickard III is a 68 y.o. male patient who has a past medical history of Arthritis, Colon polyps, History of colon polyps, Hypertension, Renal disorder, and Sleep apnea. He presents with neck pain.  He has a chronic history of neck pain for over the past 3 years.  Today he reports bilateral axial neck pain, constant, sharp, shooting, aching, 6/10.  He denies any radicular pain.  No numbness or weakness.  Pain is worse with neck rotation and neck extension      Pain Intervention History:      Past Spine Surgical History:      Past and current medications:  Antineuropathics:  NSAIDs: mobic  Physical therapy: yes, completed   Antidepressants:  Muscle relaxers:  Opioids:  Antiplatelets/Anticoagulants: aspirin     History:    Current Outpatient Medications:     aspirin 81 MG Chew, Take 81 mg by mouth once daily., Disp: , Rfl:     BYSTOLIC 20 mg Tab, 20 mg. , Disp: , Rfl:     cholecalciferol, vitamin D3, 125 mcg (5,000 unit) capsule, Take 1 capsule (5,000 Units total) by mouth twice a week., Disp: , Rfl:     cyanocobalamin, vitamin B-12, (VITAMIN B-12) 2,500 mcg Subl, Place 2,500 mcg under the tongue every other day., Disp: , Rfl: 0    finasteride (PROPECIA) 1 mg tablet, Take 1 mg by mouth once daily., Disp: , Rfl: 3    gluc willis/chondro willis A/vit C/Mn (GLUCOSAMINE 1500 COMPLEX ORAL), Take by mouth., Disp: , Rfl:     losartan (COZAAR) 25 MG tablet, , Disp: , Rfl:     meloxicam (MOBIC) 15 MG tablet, Take 1 tablet (15 mg total) by mouth once daily., Disp: 30 tablet, Rfl: 2    multivitamin capsule, Take 1 capsule by mouth once daily., Disp: , Rfl:     omeprazole (PRILOSEC) 40 MG capsule, Take 1 capsule (40 mg total) by mouth once daily., Disp: 30 capsule, Rfl: 11    ondansetron  (ZOFRAN-ODT) 8 MG TbDL, Take 1 tablet (8 mg total) by mouth 3 (three) times daily., Disp: 30 tablet, Rfl: 1    oxymetazoline (AFRIN) 0.05 % nasal spray, 2 sprays by Nasal route nightly as needed. , Disp: , Rfl:     pravastatin (PRAVACHOL) 10 MG tablet, Take 10 mg by mouth once daily. , Disp: , Rfl:     SLOW RELEASE IRON 144 mg (45 mg iron) TbSR, Take 1 tablet with vitamin C 250 mg daily, Disp: , Rfl:     testosterone cypionate (DEPOTESTOTERONE CYPIONATE) 200 mg/mL injection, 0.8 ml injection weekly, Disp: 10 mL, Rfl: 5    cyclobenzaprine (FLEXERIL) 5 MG tablet, Take 1 tablet (5 mg total) by mouth 2 (two) times daily as needed for Muscle spasms., Disp: 20 tablet, Rfl: 0    gabapentin (NEURONTIN) 600 MG tablet, Take 600 mg by mouth 2 (two) times daily., Disp: , Rfl:   No current facility-administered medications for this visit.    Facility-Administered Medications Ordered in Other Visits:     diphenhydrAMINE injection 25 mg, 25 mg, Intravenous, Q6H PRN, Case Juarez MD    HYDROmorphone injection 0.5 mg, 0.5 mg, Intravenous, Q5 Min PRN, Case Juarez MD, 0.5 mg at 10/26/21 1235    lorazepam injection 0.25 mg, 0.25 mg, Intravenous, Once PRN, Case Juarez MD    ondansetron injection 4 mg, 4 mg, Intravenous, Daily PRN, Case Juarez MD    sodium chloride 0.9% bolus 250 mL, 250 mL, Intravenous, Once, Case Juarez MD    Past Medical History:   Diagnosis Date    Arthritis     Colon polyps     History of colon polyps 4/14/2014    Hypertension     Renal disorder     Sleep apnea     wears CPAP       Past Surgical History:   Procedure Laterality Date    CATARACT EXTRACTION W/  INTRAOCULAR LENS IMPLANT      curt    COLONOSCOPY N/A 10/7/2019    Procedure: COLONOSCOPY;  Surgeon: To Martinez Jr., MD;  Location: [facility];  Service: Endoscopy;  Laterality: N/A;    COLONOSCOPY W/ POLYPECTOMY  9/26/2008  Juan    One 1 to 2 mm polyp in the hepatic flexure.  TUBULAR ADENOMA.   Redundant colon.    "Otherwise, the colon and term ileum is normal.     EYE SURGERY      cataract OU    EYE SURGERY Bilateral     eye lid    GASTRIC BYPASS  1999    LIPOSUCTION  2011    plastic surgery - abdominal dermolipectomy ("tummy tuck")    TRANSURETHRAL SURGICAL REMOVAL OF PROSTATE (TURP) USING GREEN LIGHT LASER N/A 10/26/2021    Procedure: TURP, USING GREEN LIGHT LASER;  Surgeon: Mikhail Owens MD;  Location: [facility] OR;  Service: Urology;  Laterality: N/A;    TRIGGER FINGER RELEASE Right 11/12/2020    Procedure: RELEASE, TRIGGER FINGER;  Surgeon: Mauricio Maravilla MD;  Location: [facility] OR;  Service: Orthopedics;  Laterality: Right;  Procedure:  Right middle finger trigger release    Position:  Supine    Anesthesia:  Local    Equipment:  Basic handset    VASECTOMY         Family History   Problem Relation Name Age of Onset    Diabetes Father      Heart disease Father          CAD stents    Hypertension Mother      Dementia Mother          early 70's       Social History     Socioeconomic History    Marital status:    Occupational History    Occupation: contractor   Tobacco Use    Smoking status: Former     Types: Cigarettes    Smokeless tobacco: Former   Substance and Sexual Activity    Alcohol use: No    Drug use: No     Social Determinants of Health     Financial Resource Strain: Low Risk  (4/17/2023)    Overall Financial Resource Strain (CARDIA)     Difficulty of Paying Living Expenses: Not hard at all   Food Insecurity: No Food Insecurity (4/17/2023)    Hunger Vital Sign     Worried About Running Out of Food in the Last Year: Never true     Ran Out of Food in the Last Year: Never true   Transportation Needs: Patient Declined (4/17/2023)    PRAPARE - Transportation     Lack of Transportation (Medical): Patient declined     Lack of Transportation (Non-Medical): Patient declined   Physical Activity: Sufficiently Active (4/17/2023)    Exercise Vital Sign     Days of Exercise per Week: 3 days     Minutes of Exercise per " Session: 60 min   Stress: No Stress Concern Present (4/17/2023)    [employer] - Occupational Stress Questionnaire     Feeling of Stress : Not at all   Housing Stability: Low Risk  (4/17/2023)    Housing Stability Vital Sign     Unable to Pay for Housing in the Last Year: No     Number of Places Lived in the Last Year: 1     Unstable Housing in the Last Year: No       Review of patient's allergies indicates:   Allergen Reactions    Cefaclor Hives     ceclor    Codeine Hives       Review of Systems:  12 point review of systems is negative.    Physical Exam:  Vitals:    08/09/24 1320   BP: (!) 145/88   Pulse: 75   PainSc:   5   PainLoc: Neck     There is no height or weight on file to calculate BMI.    Gen: NAD  Psych: mood appropriate for given condition  HEENT: eyes anicteric   CV: RRR  HEENT: anicteric   Respiratory: non-labored, no signs of respiratory distress  Abd: non-distended  Skin: warm, dry and intact.  Gait: No antalgic gait.     Reproducible pain with cervical axial facet loading    Sensory:  Intact and symmetrical to light touch in C4-T1 dermatomes bilaterally.     Motor:    Right Left   C4 Shoulder Abduction  5  5   C5 Elbow Flexion    5  5   C6 Wrist Extension  5  5   C7 Elbow Extension   5  5   C8/T1 Hand Intrinsics   5  5      Right Left   Triceps DTR 1+ 1+   Biceps DTR 0 0        Patellar DTR 1+ 1+        Ryan Absent  Absent                 Labs:  Lab Results   Component Value Date    HGBA1C 6.5 (H) 04/09/2024       Lab Results   Component Value Date    WBC 4.54 04/09/2024    HGB 14.1 04/09/2024    HCT 43.2 04/09/2024    MCV 94 04/09/2024     (L) 04/09/2024           Imaging:  Xray cervical spine 8/9/24  FINDINGS:  The C7 vertebral body is obscured on the lateral view but grossly normal on the swimmer's view.  The vertebral bodies maintain normal height.  There is no fracture.  Alignment is grossly normal.  The odontoid process is intact.  There is large  anterior osteophyte formation from the C3-4 through C6-7 levels.  There is disc space narrowing most apparent at the C5-6 and C6-7 levels.  There is multilevel bony foraminal stenosis.  There is no abnormal excursion demonstrated with flexion or extension.    Assessment:   Problem List Items Addressed This Visit    None  Visit Diagnoses       Cervical spondylosis    -  Primary    Relevant Orders    X-Ray Cervical Spine 5 View With Flex And Ext (Completed)    Cervicalgia        Relevant Orders    X-Ray Cervical Spine 5 View With Flex And Ext (Completed)    Myofascial pain        Relevant Medications    cyclobenzaprine (FLEXERIL) 5 MG tablet              Tex Pickard III is a 68 y.o. male patient who has a past medical history of Arthritis, Colon polyps, History of colon polyps, Hypertension, Renal disorder, and Sleep apnea. He presents with neck pain.  He has a chronic history of neck pain for over the past 3 years.  Today he reports bilateral axial neck pain, constant, sharp, shooting, aching, 6/10.  He denies any radicular pain.  No numbness or weakness.  Pain is worse with neck rotation and neck extension    - on exam he has full strength in his upper extremities and intact sensation to light touch bilateral C4-T1.  He has reproducible pain with cervical axial facet  - cervical x-rays consistent with multilevel bilateral facet arthropathy  - over the past 8 weeks he has been participating in formal physical therapy however he continues to have axial neck pain that is limiting his mobility and interfering with the quality of life  - I think his pain is secondary to cervical spondylosis.  He may also have a myofascial component of his pain.  He failed to get relief with dry needling at PT  - I have prescribed him some Flexeril to use 1 to 2 times a day as needed for myofascial pain.  He understands side effects include drowsiness  - his pain is limiting his mobility and interfering with the quality of his life.  -  we will schedule 1st diagnostic bilateral C3-4 and C4-5 medial branch blocks.  The risks and benefits of this intervention, and alternative therapies were discussed with the patient.  The discussion of risks included infection, bleeding, need for additional procedures or surgery, nerve damage.  Questions regarding the procedure, risks, expected outcome, and possible side effects were solicited and answered to the patient's satisfaction.  Tex Pickard wishes to proceed with the injection or procedure.  Written consent was obtained.  - if he fails to get significant improvement we will get a cervical MRI      : Not applicable    Augie Hsu M.D.  Interventional Pain Medicine / Anesthesiology    This note was completed with dictation software and grammatical errors may exist.

## 2024-08-12 DIAGNOSIS — M47.812 CERVICAL SPONDYLOSIS: Primary | ICD-10-CM

## 2024-08-12 RX ORDER — SODIUM CHLORIDE, SODIUM LACTATE, POTASSIUM CHLORIDE, CALCIUM CHLORIDE 600; 310; 30; 20 MG/100ML; MG/100ML; MG/100ML; MG/100ML
INJECTION, SOLUTION INTRAVENOUS CONTINUOUS
OUTPATIENT
Start: 2024-08-12

## 2024-08-12 NOTE — TELEPHONE ENCOUNTER
Spoke with patient and scheduled. Per provider patient advised to hold ASA x 7 days prior. Pre op information was given.

## 2024-09-03 RX ORDER — FLUTICASONE PROPIONATE 50 MCG
1 SPRAY, SUSPENSION (ML) NASAL DAILY
COMMUNITY

## 2024-09-04 ENCOUNTER — HOSPITAL ENCOUNTER (OUTPATIENT)
Facility: HOSPITAL | Age: 69
Discharge: HOME | End: 2024-09-04
Attending: ANESTHESIOLOGY | Admitting: ANESTHESIOLOGY
Payer: COMMERCIAL

## 2024-09-04 ENCOUNTER — HOSPITAL ENCOUNTER (OUTPATIENT)
Dept: RADIOLOGY | Facility: HOSPITAL | Age: 69
Discharge: HOME | End: 2024-09-04
Attending: ANESTHESIOLOGY | Admitting: ANESTHESIOLOGY
Payer: COMMERCIAL

## 2024-09-04 VITALS
HEART RATE: 66 BPM | TEMPERATURE: 97 F | HEIGHT: 71 IN | WEIGHT: 258 LBS | OXYGEN SATURATION: 98 % | RESPIRATION RATE: 16 BRPM | BODY MASS INDEX: 36.12 KG/M2 | SYSTOLIC BLOOD PRESSURE: 146 MMHG | DIASTOLIC BLOOD PRESSURE: 83 MMHG

## 2024-09-04 DIAGNOSIS — M47.812 CERVICAL SPONDYLOSIS: Primary | ICD-10-CM

## 2024-09-04 DIAGNOSIS — M54.2 NECK PAIN: ICD-10-CM

## 2024-09-04 PROCEDURE — 64491 INJ PARAVERT F JNT C/T 2 LEV: CPT | Mod: 50,PO | Performed by: ANESTHESIOLOGY

## 2024-09-04 PROCEDURE — 64491 INJ PARAVERT F JNT C/T 2 LEV: CPT | Mod: 50,,, | Performed by: ANESTHESIOLOGY

## 2024-09-04 PROCEDURE — 25000003 PHARM REV CODE 250: Mod: PO | Performed by: ANESTHESIOLOGY

## 2024-09-04 PROCEDURE — 64490 INJ PARAVERT F JNT C/T 1 LEV: CPT | Mod: 50,PO | Performed by: ANESTHESIOLOGY

## 2024-09-04 PROCEDURE — 63600175 PHARM REV CODE 636 W HCPCS: Mod: PO | Performed by: ANESTHESIOLOGY

## 2024-09-04 PROCEDURE — 64490 INJ PARAVERT F JNT C/T 1 LEV: CPT | Mod: 50,,, | Performed by: ANESTHESIOLOGY

## 2024-09-04 PROCEDURE — 99152 MOD SED SAME PHYS/QHP 5/>YRS: CPT | Mod: ,,, | Performed by: ANESTHESIOLOGY

## 2024-09-04 RX ORDER — BUPIVACAINE HYDROCHLORIDE 2.5 MG/ML
INJECTION, SOLUTION EPIDURAL; INFILTRATION; INTRACAUDAL
Status: DISCONTINUED | OUTPATIENT
Start: 2024-09-04 | End: 2024-09-04 | Stop reason: HOSPADM

## 2024-09-04 RX ORDER — LIDOCAINE HYDROCHLORIDE 10 MG/ML
INJECTION, SOLUTION EPIDURAL; INFILTRATION; INTRACAUDAL; PERINEURAL
Status: DISCONTINUED | OUTPATIENT
Start: 2024-09-04 | End: 2024-09-04 | Stop reason: HOSPADM

## 2024-09-04 RX ORDER — MIDAZOLAM HYDROCHLORIDE 1 MG/ML
INJECTION INTRAMUSCULAR; INTRAVENOUS
Status: DISCONTINUED | OUTPATIENT
Start: 2024-09-04 | End: 2024-09-04 | Stop reason: HOSPADM

## 2024-09-04 RX ORDER — SODIUM CHLORIDE, SODIUM LACTATE, POTASSIUM CHLORIDE, CALCIUM CHLORIDE 600; 310; 30; 20 MG/100ML; MG/100ML; MG/100ML; MG/100ML
INJECTION, SOLUTION INTRAVENOUS CONTINUOUS
Status: DISCONTINUED | OUTPATIENT
Start: 2024-09-04 | End: 2024-09-04 | Stop reason: HOSPADM

## 2024-09-04 RX ADMIN — SODIUM CHLORIDE, POTASSIUM CHLORIDE, SODIUM LACTATE AND CALCIUM CHLORIDE: 600; 310; 30; 20 INJECTION, SOLUTION INTRAVENOUS at 02:09

## 2024-09-04 NOTE — INTERVAL H&P NOTE
The patient has been examined and the H&P has been reviewed:    I concur with the findings and no changes have occurred since H&P was written.  He has held his anticoagulation appropriately.   ASA 2, MP II        There are no hospital problems to display for this patient.

## 2024-09-04 NOTE — OP NOTE
Procedure Note    Procedure date: 9/4/2024    Procedure:  Diagnostic Cervical Medial Branch @ C3/4 and C4/5, with Fluoroscopic Guidance on the bilateral side utilizing fluoroscopy    Indication:   Tex Pickard III has chronic, moderate to severe axial lower back pain present for over the past 3 months that has failed to respond to physical therapy and PT-directed home exercises. There is no untreated radiculopathy or claudication present.  The patient has clinical and radiologic findings suggestive of facet mediated pain.     Pre-op diagnosis: Cervical spondylosis    Post-op diagnosis: same    Physician: Augie Hsu MD    Medications injected:  bupivacaine 0.25%, 0.5ml at each level    Local anesthetic used: 1% lidocaine, 1ml at each level    IV Anxiolysis medications: versed 2mg    Estimated blood loss:  none    Complications:  none    Technique: The patient was interviewed in the holding area and Risks/Benefits were discussed, including, but not limited to, the possibility of new or different pain, bleeding or infection.  All questions were answered.  The patient understood and accepted risks.  Consent was reviewed.  A time-out was taken to identify patient and procedure side prior to starting the procedure.  The patient was brought into the operating room and placed in prone position and prepped and draped in the usual fashion using ChloraPrep x2 and sterile towels and then the procedure was performed using strict aseptic techniques.  AP fluoroscopy was used to identify the waists of the mid-articular pillars of the C3-5 on the bilateral side.  1% Lidocaine was used via a 25 Gauge needle for skin.  Then, under AP fluoroscopic guidance, a 25 gauge 3.5 inch spinal needle was advanced to the anatomic location of the midsection of the lateral masses (or in the case of third occipital nerve, to the joint of C2/C3).  Once os was encountered, lateral fluoroscopic views were obtained to ensure that needles did not  cross into neural foramina.  After heme-negative aspiration, 0.5ml of 0.25% bupivacaine was injected at each of the above targeted points corresponding to the locations of the targeted medial branch nerves.     The patient tolerated the procedure well and was transferred to the [facility] in stable condition.  The patient was monitored after the procedure.  The patient will be contacted tomorrow to determine the extent of relief.  The patient was given post procedure and discharge instructions to follow at home.  The patient was discharged in a stable condition.    Event Time In   In Facility 1359   In Pre-Procedure 1410   Physician Available    Anesthesia Available    Pre-Op: Bedside Procedure Start    Pre-Op: Bedside Procedure Stop    Pre-Procedure Complete 1432   Out of Pre-Procedure    Anesthesia Start    Anesthesia Start Data Collection    Setup Start    Setup Complete    In Room 1604   Prep Start    Procedure Prep Complete    MD notified pt. ready    Procedure Start 1610   Procedure Closing    Emergence    Procedure Finish 1614   Sedation Start 1603   Scope In    Extent Reached    Scope Out    Sedation End 1614   Out of Room 1615   Cleanup Start    Cleanup Complete    Cosmetic Start    Cosmetic Stop    Pain Mgmt In Room    Pain Mgmt Out Room    In Recovery    Anesthesia Finish    Bedside Procedure Start    Bedside Procedure Stop    Recovery Care Complete    Out of Recovery    To Phase II    In Phase II    Pain Mgmt Recovery Start 1616   Pain Mgmt Recovery Stop    Obs Rec Start    Obs Rec Stop    Phase II Care Complete    Out of Phase II    Procedural Care Complete    Discharge    Pain Follow Up Needed    Pain Follow Up Complete

## 2024-09-04 NOTE — DISCHARGE SUMMARY
[facility]  Discharge Note  Short Stay    Admit Date: 9/4/2024    Discharge Date: 9/4/2024    Attending Physician: Augie Hsu     Discharge Provider: Augie Hsu    Diagnoses:  There are no hospital problems to display for this patient.      Discharged Condition: Good    Final Diagnoses: Cervical spondylosis [M47.812]    Disposition: Home or Self Care    Hospital Course: No complications, uneventful    Outcome of Hospitalization, Treatment, Procedure, or Surgery:  Patient was admitted for outpatient interventional pain management procedure. The patient tolerated the procedure well with no complications.    Follow up/Patient Instructions:  Follow up as scheduled in Pain Management office in 2-3 weeks.  Patient has received instructions and follow up date and time.    Medications:  Continue previous medications    Discharge Procedure Orders   Notify your health care provider if you experience any of the following:  temperature >100.4     Notify your health care provider if you experience any of the following:  persistent nausea and vomiting or diarrhea     Notify your health care provider if you experience any of the following:  severe uncontrolled pain     Notify your health care provider if you experience any of the following:  redness, tenderness, or signs of infection (pain, swelling, redness, odor or green/yellow discharge around incision site)     Notify your health care provider if you experience any of the following:  difficulty breathing or increased cough     Notify your health care provider if you experience any of the following:  severe persistent headache     Notify your health care provider if you experience any of the following:  worsening rash     Notify your health care provider if you experience any of the following:  persistent dizziness, light-headedness, or visual disturbances     Notify your health care provider if you experience any of the following:  increased confusion or  weakness     Activity as tolerated

## 2024-09-05 ENCOUNTER — TELEPHONE (OUTPATIENT)
Dept: PAIN MEDICINE | Facility: CLINIC | Age: 69
End: 2024-09-05
Payer: COMMERCIAL

## 2024-09-05 NOTE — TELEPHONE ENCOUNTER
I left you a message please answer the following questions in regards to your recent block procedure:    Regarding your Cervical Medial Branch Block , For Date of Service:  9/4/24    Please answer the following questions:    1. What percentage of pain relief did you receive following the block, from 0-100%?     2. How many hours did pain relief last following the block?      3. During this time please describe in detail the activities you were able to do?    4. Pain score from 0-10 pre procedure -      5. Pain score from 0-10 post procedure -

## 2024-09-06 ENCOUNTER — PATIENT MESSAGE (OUTPATIENT)
Dept: PAIN MEDICINE | Facility: CLINIC | Age: 69
End: 2024-09-06
Payer: COMMERCIAL

## 2024-09-06 ENCOUNTER — TELEPHONE (OUTPATIENT)
Dept: PAIN MEDICINE | Facility: CLINIC | Age: 69
End: 2024-09-06
Payer: COMMERCIAL

## 2024-09-06 NOTE — TELEPHONE ENCOUNTER
----- Message from Zuri Castaneda MA sent at 9/5/2024  1:22 PM CDT -----  Regarding: Return Call  Message Type: Return Call           Who Called:LOU DAWSON III [[id number]]              Who Left Message for Patient:Bruna Kwok LPN              Does the patient know what this is regarding?  yes              Best Call Back Number: [phone]               Additional Information: Patient is returning a call.  Please assist.

## 2024-09-09 ENCOUNTER — OFFICE VISIT (OUTPATIENT)
Dept: PAIN MEDICINE | Facility: CLINIC | Age: 69
End: 2024-09-09
Payer: COMMERCIAL

## 2024-09-09 VITALS
HEART RATE: 6 BPM | SYSTOLIC BLOOD PRESSURE: 141 MMHG | DIASTOLIC BLOOD PRESSURE: 85 MMHG | BODY MASS INDEX: 35.82 KG/M2 | WEIGHT: 256.81 LBS

## 2024-09-09 DIAGNOSIS — M54.2 CERVICALGIA: Primary | ICD-10-CM

## 2024-09-09 DIAGNOSIS — M47.812 CERVICAL SPONDYLOSIS: ICD-10-CM

## 2024-09-09 DIAGNOSIS — M79.18 MYOFASCIAL PAIN: ICD-10-CM

## 2024-09-09 DIAGNOSIS — M50.30 DDD (DEGENERATIVE DISC DISEASE), CERVICAL: ICD-10-CM

## 2024-09-09 PROCEDURE — 3079F DIAST BP 80-89 MM HG: CPT | Mod: CPTII,S$GLB,,

## 2024-09-09 PROCEDURE — 3044F HG A1C LEVEL LT 7.0%: CPT | Mod: CPTII,S$GLB,,

## 2024-09-09 PROCEDURE — 1125F AMNT PAIN NOTED PAIN PRSNT: CPT | Mod: CPTII,S$GLB,,

## 2024-09-09 PROCEDURE — 1159F MED LIST DOCD IN RCRD: CPT | Mod: CPTII,S$GLB,,

## 2024-09-09 PROCEDURE — 3288F FALL RISK ASSESSMENT DOCD: CPT | Mod: CPTII,S$GLB,,

## 2024-09-09 PROCEDURE — 99213 OFFICE O/P EST LOW 20 MIN: CPT | Mod: S$GLB,,,

## 2024-09-09 PROCEDURE — 99999 PR PBB SHADOW E&M-EST. PATIENT-LVL III: CPT | Mod: PBBFAC,,,

## 2024-09-09 PROCEDURE — 1101F PT FALLS ASSESS-DOCD LE1/YR: CPT | Mod: CPTII,S$GLB,,

## 2024-09-09 PROCEDURE — 3008F BODY MASS INDEX DOCD: CPT | Mod: CPTII,S$GLB,,

## 2024-09-09 PROCEDURE — 4010F ACE/ARB THERAPY RXD/TAKEN: CPT | Mod: CPTII,S$GLB,,

## 2024-09-09 PROCEDURE — 3077F SYST BP >= 140 MM HG: CPT | Mod: CPTII,S$GLB,,

## 2024-09-09 RX ORDER — CYCLOBENZAPRINE HCL 5 MG
5 TABLET ORAL 2 TIMES DAILY PRN
Qty: 60 TABLET | Refills: 1 | Status: SHIPPED | OUTPATIENT
Start: 2024-09-09 | End: 2024-10-09

## 2024-09-09 NOTE — PROGRESS NOTES
[facility] Follow Up Evaluation      Referred by: No ref. provider found    PCP:     CC:   Chief Complaint   Patient presents with    Neck Pain          9/9/2024     8:43 AM 8/9/2024     1:19 PM   Last 3 PDI Scores   Pain Disability Index (PDI) 43 27     Interval HPI 9/9/2024: Tex Pickard III returns to the office for follow up.  He is s/p bilateral C3/4 and C4/5 diagnostic medial branch block on 09/04/2024 with 0% relief.  Today he is reporting continued neck pain, 6/10, with radiation into back of his head and into top of bilateral shoulders, constant, worsened with rotating his head, looking up and down.  He describes the pain as sharp, shooting.  No pain into arms, no numbness, weakness or any new changes to his bowel bladder function.  He has been taking Flexeril and NSAIDs without significant relief.      HPI:   Tex Pickard III is a 68 y.o. male patient who has a past medical history of Arthritis, Colon polyps, History of colon polyps, Hypertension, Renal disorder, and Sleep apnea. He presents with neck pain.  He has a chronic history of neck pain for over the past 3 years.  Today he reports bilateral axial neck pain, constant, sharp, shooting, aching, 6/10.  He denies any radicular pain.  No numbness or weakness.  Pain is worse with neck rotation and neck extension      Pain Intervention History:  - s/p bilateral C3/4 and C4/5 diagnostic medial branch block on 09/04/2024 with 0% relief.    Past Spine Surgical History:      Past and current medications:  Antineuropathics:  NSAIDs: mobic  Physical therapy: yes, completed   Antidepressants:  Muscle relaxers:  Opioids:  Antiplatelets/Anticoagulants: aspirin     History:    Current Outpatient Medications:     aspirin 81 MG Chew, Take 81 mg by mouth once daily., Disp: , Rfl:     BYSTOLIC 20 mg Tab, 20 mg. , Disp: , Rfl:     cholecalciferol, vitamin D3, 125 mcg (5,000 unit) capsule, Take 1 capsule (5,000 Units total) by mouth twice a week., Disp:  , Rfl:     cyanocobalamin, vitamin B-12, (VITAMIN B-12) 2,500 mcg Subl, Place 2,500 mcg under the tongue every other day., Disp: , Rfl: 0    finasteride (PROPECIA) 1 mg tablet, Take 1 mg by mouth once daily., Disp: , Rfl: 3    fluticasone propionate (FLONASE) 50 mcg/actuation nasal spray, 1 spray by Each Nostril route once daily., Disp: , Rfl:     gluc willis/chondro willis A/vit C/Mn (GLUCOSAMINE 1500 COMPLEX ORAL), Take by mouth., Disp: , Rfl:     losartan (COZAAR) 25 MG tablet, , Disp: , Rfl:     meloxicam (MOBIC) 15 MG tablet, Take 1 tablet (15 mg total) by mouth once daily., Disp: 30 tablet, Rfl: 2    multivitamin capsule, Take 1 capsule by mouth once daily., Disp: , Rfl:     omeprazole (PRILOSEC) 40 MG capsule, Take 1 capsule (40 mg total) by mouth once daily., Disp: 30 capsule, Rfl: 11    ondansetron (ZOFRAN-ODT) 8 MG TbDL, Take 1 tablet (8 mg total) by mouth 3 (three) times daily., Disp: 30 tablet, Rfl: 1    oxymetazoline (AFRIN) 0.05 % nasal spray, 2 sprays by Nasal route nightly as needed. , Disp: , Rfl:     pravastatin (PRAVACHOL) 10 MG tablet, Take 10 mg by mouth once daily. , Disp: , Rfl:     SLOW RELEASE IRON 144 mg (45 mg iron) TbSR, Take 1 tablet with vitamin C 250 mg daily, Disp: , Rfl:     testosterone cypionate (DEPOTESTOTERONE CYPIONATE) 200 mg/mL injection, 0.8 ml injection weekly, Disp: 10 mL, Rfl: 5    cyclobenzaprine (FLEXERIL) 5 MG tablet, Take 1 tablet (5 mg total) by mouth 2 (two) times daily as needed for Muscle spasms., Disp: 60 tablet, Rfl: 01    gabapentin (NEURONTIN) 600 MG tablet, Take 600 mg by mouth 2 (two) times daily., Disp: , Rfl:   No current facility-administered medications for this visit.    Facility-Administered Medications Ordered in Other Visits:     diphenhydrAMINE injection 25 mg, 25 mg, Intravenous, Q6H PRN, D'Hemecourt, Case P, MD    HYDROmorphone injection 0.5 mg, 0.5 mg, Intravenous, Q5 Min PRNAnn John P, MD, 0.5 mg at 10/26/21 1235    lorazepam injection 0.25  "mg, 0.25 mg, Intravenous, Once PRN, Case Juarez MD    ondansetron injection 4 mg, 4 mg, Intravenous, Daily PRN, Case Juarez MD    sodium chloride 0.9% bolus 250 mL, 250 mL, Intravenous, Once, Case Juarez MD    Past Medical History:   Diagnosis Date    Arthritis     Colon polyps     History of colon polyps 4/14/2014    Hypertension     Renal disorder     Sleep apnea     wears CPAP       Past Surgical History:   Procedure Laterality Date    CATARACT EXTRACTION W/  INTRAOCULAR LENS IMPLANT      curt    COLONOSCOPY N/A 10/7/2019    Procedure: COLONOSCOPY;  Surgeon: To Martinez Jr., MD;  Location: [facility] ENDO;  Service: Endoscopy;  Laterality: N/A;    COLONOSCOPY W/ POLYPECTOMY  9/26/2008  [facility]    One 1 to 2 mm polyp in the hepatic flexure.  TUBULAR ADENOMA.   Redundant colon.   Otherwise, the colon and term ileum is normal.     EYE SURGERY      cataract OU    EYE SURGERY Bilateral     eye lid    GASTRIC BYPASS  1999    INJECTION OF ANESTHETIC AGENT AROUND MEDIAL BRANCH NERVES INNERVATING CERVICAL FACET JOINT Bilateral 9/4/2024    Procedure: Block-nerve-medial branch-cervical    C3/4, C4/5;  Surgeon: Augie Hsu MD;  Location: [facility] OR;  Service: Pain Management;  Laterality: Bilateral;    LIPOSUCTION  2011    plastic surgery - abdominal dermolipectomy ("tummy tuck")    TRANSURETHRAL SURGICAL REMOVAL OF PROSTATE (TURP) USING GREEN LIGHT LASER N/A 10/26/2021    Procedure: TURP, USING GREEN LIGHT LASER;  Surgeon: Mikhail Owens MD;  Location: [facility] OR;  Service: Urology;  Laterality: N/A;    TRIGGER FINGER RELEASE Right 11/12/2020    Procedure: RELEASE, TRIGGER FINGER;  Surgeon: Mauricio Maravilla MD;  Location: [facility] OR;  Service: Orthopedics;  Laterality: Right;  Procedure:  Right middle finger trigger release    Position:  Supine    Anesthesia:  Local    Equipment:  Basic handset    VASECTOMY         Family History   Problem Relation Name Age of Onset    Diabetes Father      Heart " disease Father          CAD stents    Hypertension Mother      Dementia Mother          early 70's       Social History     Socioeconomic History    Marital status:    Occupational History    Occupation: contractor   Tobacco Use    Smoking status: Former     Types: Cigarettes    Smokeless tobacco: Former   Substance and Sexual Activity    Alcohol use: No    Drug use: No     Social Determinants of Health     Financial Resource Strain: Low Risk  (4/17/2023)    Overall Financial Resource Strain (CARDIA)     Difficulty of Paying Living Expenses: Not hard at all   Food Insecurity: No Food Insecurity (4/17/2023)    Hunger Vital Sign     Worried About Running Out of Food in the Last Year: Never true     Ran Out of Food in the Last Year: Never true   Transportation Needs: Patient Declined (4/17/2023)    PRAPARE - Transportation     Lack of Transportation (Medical): Patient declined     Lack of Transportation (Non-Medical): Patient declined   Physical Activity: Sufficiently Active (4/17/2023)    Exercise Vital Sign     Days of Exercise per Week: 3 days     Minutes of Exercise per Session: 60 min   Stress: No Stress Concern Present (4/17/2023)    [employer] [place] [employer] - Occupational Stress Questionnaire     Feeling of Stress : Not at all   Housing Stability: Low Risk  (4/17/2023)    Housing Stability Vital Sign     Unable to Pay for Housing in the Last Year: No     Number of Places Lived in the Last Year: 1     Unstable Housing in the Last Year: No       Review of patient's allergies indicates:   Allergen Reactions    Cefaclor Hives     ceclor    Codeine Hives       Review of Systems:  12 point review of systems is negative.    Physical Exam:  Vitals:    09/09/24 0844   BP: (!) 141/85   Pulse: (!) 6   Weight: 116.5 kg (256 lb 13.4 oz)   PainSc:   6   PainLoc: Neck       Body mass index is 35.82 kg/m².    Gen: NAD  Psych: mood appropriate for given condition  HEENT: eyes anicteric   CV: RRR  HEENT:  anicteric   Respiratory: non-labored, no signs of respiratory distress  Abd: non-distended  Skin: warm, dry and intact.  Gait: No antalgic gait.     Reproducible pain with cervical axial facet loading    Sensory:  Intact and symmetrical to light touch in C4-T1 dermatomes bilaterally.     Motor:    Right Left   C4 Shoulder Abduction  5  5   C5 Elbow Flexion    5  5   C6 Wrist Extension  5  5   C7 Elbow Extension   5  5   C8/T1 Hand Intrinsics   5  5      Right Left   Triceps DTR 1+ 1+   Biceps DTR 0 0        Patellar DTR 1+ 1+        Ryan Absent  Absent                 Labs:  Lab Results   Component Value Date    HGBA1C 6.5 (H) 04/09/2024       Lab Results   Component Value Date    WBC 4.54 04/09/2024    HGB 14.1 04/09/2024    HCT 43.2 04/09/2024    MCV 94 04/09/2024     (L) 04/09/2024           Imaging:  Xray cervical spine 8/9/24  FINDINGS:  The C7 vertebral body is obscured on the lateral view but grossly normal on the swimmer's view.  The vertebral bodies maintain normal height.  There is no fracture.  Alignment is grossly normal.  The odontoid process is intact.  There is large anterior osteophyte formation from the C3-4 through C6-7 levels.  There is disc space narrowing most apparent at the C5-6 and C6-7 levels.  There is multilevel bony foraminal stenosis.  There is no abnormal excursion demonstrated with flexion or extension.    Assessment:   Problem List Items Addressed This Visit    None  Visit Diagnoses       Cervicalgia    -  Primary    Relevant Orders    MRI Cervical Spine Without Contrast    Myofascial pain        Relevant Medications    cyclobenzaprine (FLEXERIL) 5 MG tablet    Cervical spondylosis        DDD (degenerative disc disease), cervical                    Tex Pickard III is a 68 y.o. male patient who has a past medical history of Arthritis, Colon polyps, History of colon polyps, Hypertension, Renal disorder, and Sleep apnea. He presents with neck pain.  He has a chronic history  of neck pain for over the past 3 years.  Today he reports bilateral axial neck pain, constant, sharp, shooting, aching, 6/10.  He denies any radicular pain.  No numbness or weakness.  Pain is worse with neck rotation and neck extension    9/9/2024: Tex Pickard III returns to the office for follow up.  He is s/p bilateral C3/4 and C4/5 diagnostic medial branch block on 09/04/2024 with 0% relief.  Today he is reporting continued neck pain, 6/10, with radiation into back of his head and into top of bilateral shoulders, constant, worsened with rotating his head, looking up and down.  He describes the pain as sharp, shooting.  No pain into arms, no numbness, weakness or any new changes to his bowel bladder function.  He has been taking Flexeril and NSAIDs without significant relief.    - on exam he has full strength in his upper extremities and intact sensation to light touch bilateral C4-T1.   - He is s/p bilateral C3/4 and C4/5 diagnostic medial branch block on 09/04/2024 with 0% relief.   - cervical x-rays consistent with multilevel bilateral facet arthropathy  - over the past 8 weeks he has been participating in formal physical therapy however he continues to have axial neck pain that is limiting his mobility and interfering with the quality of life  - unfortunately, he has not found significant relief with formal physical therapy, diagnostic medial branch blocks, muscle relaxers and NSAIDs.  - at this time, I would like to order an MRI of the cervical spine for further evaluation.  - refill for Flexeril provided today.  - we will call him with results of imaging and future treatment plan.      : Not applicable      This note was completed with dictation software and grammatical errors may exist.

## 2024-10-01 ENCOUNTER — HOSPITAL ENCOUNTER (OUTPATIENT)
Dept: RADIOLOGY | Facility: HOSPITAL | Age: 69
Discharge: HOME | End: 2024-10-01
Payer: COMMERCIAL

## 2024-10-01 DIAGNOSIS — M54.2 CERVICALGIA: ICD-10-CM

## 2024-10-01 PROCEDURE — 72141 MRI NECK SPINE W/O DYE: CPT | Mod: 26,,, | Performed by: RADIOLOGY

## 2024-10-01 PROCEDURE — 72141 MRI NECK SPINE W/O DYE: CPT | Mod: TC,PO

## 2024-10-03 ENCOUNTER — TELEPHONE (OUTPATIENT)
Dept: PAIN MEDICINE | Facility: CLINIC | Age: 69
End: 2024-10-03
Payer: COMMERCIAL

## 2024-10-03 NOTE — TELEPHONE ENCOUNTER
Please let patient know I have reviewed his cervical MRI, he does have a disc protrusion at C5-6 that could be contributing to his pain.  If interested please schedule him for the following procedure:    Physician - Dr Hsu    Type of Procedure/Injection - Cervical Epidural  C7/T1           Laterality - NA      Anxiolysis- RNIV      Need to hold medication - Yes      Aspirin for 7 days and NSAIDs for 2 days      Clearance needed - No      Follow up - 3 week

## 2024-10-07 DIAGNOSIS — M54.12 CERVICAL RADICULOPATHY: Primary | ICD-10-CM

## 2024-10-07 RX ORDER — SODIUM CHLORIDE, SODIUM LACTATE, POTASSIUM CHLORIDE, CALCIUM CHLORIDE 600; 310; 30; 20 MG/100ML; MG/100ML; MG/100ML; MG/100ML
INJECTION, SOLUTION INTRAVENOUS CONTINUOUS
OUTPATIENT
Start: 2024-10-07

## 2024-10-16 ENCOUNTER — HOSPITAL ENCOUNTER (OUTPATIENT)
Dept: RADIOLOGY | Facility: HOSPITAL | Age: 69
Discharge: HOME | End: 2024-10-16
Attending: ANESTHESIOLOGY | Admitting: ANESTHESIOLOGY
Payer: COMMERCIAL

## 2024-10-16 ENCOUNTER — HOSPITAL ENCOUNTER (OUTPATIENT)
Facility: HOSPITAL | Age: 69
Discharge: HOME | End: 2024-10-16
Attending: ANESTHESIOLOGY | Admitting: ANESTHESIOLOGY
Payer: COMMERCIAL

## 2024-10-16 DIAGNOSIS — M54.2 NECK PAIN: ICD-10-CM

## 2024-10-16 DIAGNOSIS — M54.12 CERVICAL RADICULOPATHY: Primary | ICD-10-CM

## 2024-10-16 PROCEDURE — 25500020 PHARM REV CODE 255: Mod: PO | Performed by: ANESTHESIOLOGY

## 2024-10-16 PROCEDURE — 62321 NJX INTERLAMINAR CRV/THRC: CPT | Mod: ,,, | Performed by: ANESTHESIOLOGY

## 2024-10-16 PROCEDURE — 63600175 PHARM REV CODE 636 W HCPCS: Mod: PO | Performed by: ANESTHESIOLOGY

## 2024-10-16 PROCEDURE — 62321 NJX INTERLAMINAR CRV/THRC: CPT | Mod: PO | Performed by: ANESTHESIOLOGY

## 2024-10-16 RX ORDER — MIDAZOLAM HYDROCHLORIDE 1 MG/ML
INJECTION INTRAMUSCULAR; INTRAVENOUS
Status: DISCONTINUED | OUTPATIENT
Start: 2024-10-16 | End: 2024-10-16 | Stop reason: HOSPADM

## 2024-10-16 RX ORDER — LIDOCAINE HYDROCHLORIDE 10 MG/ML
INJECTION, SOLUTION EPIDURAL; INFILTRATION; INTRACAUDAL; PERINEURAL
Status: DISCONTINUED | OUTPATIENT
Start: 2024-10-16 | End: 2024-10-16 | Stop reason: HOSPADM

## 2024-10-16 RX ORDER — DEXAMETHASONE SODIUM PHOSPHATE 10 MG/ML
INJECTION INTRAMUSCULAR; INTRAVENOUS
Status: DISCONTINUED | OUTPATIENT
Start: 2024-10-16 | End: 2024-10-16 | Stop reason: HOSPADM

## 2024-10-16 RX ORDER — SODIUM CHLORIDE, SODIUM LACTATE, POTASSIUM CHLORIDE, CALCIUM CHLORIDE 600; 310; 30; 20 MG/100ML; MG/100ML; MG/100ML; MG/100ML
INJECTION, SOLUTION INTRAVENOUS CONTINUOUS
Status: DISCONTINUED | OUTPATIENT
Start: 2024-10-16 | End: 2024-10-16 | Stop reason: HOSPADM

## 2024-10-16 NOTE — H&P
[place] - Surgery  History & Physical - Short Stay  Pain Management       SUBJECTIVE:     Procedure: Procedure(s) (LRB):  Injection-steroid-epidural-cervical     C7/T1 (N/A)    Chief Complaint/Reason for Admission:  Cervical radiculopathy [M54.12]    PTA Medications   Medication Sig    BYSTOLIC 20 mg Tab 20 mg.     cholecalciferol, vitamin D3, 125 mcg (5,000 unit) capsule Take 1 capsule (5,000 Units total) by mouth twice a week.    cyanocobalamin, vitamin B-12, (VITAMIN B-12) 2,500 mcg Subl Place 2,500 mcg under the tongue every other day.    finasteride (PROPECIA) 1 mg tablet Take 1 mg by mouth once daily.    fluticasone propionate (FLONASE) 50 mcg/actuation nasal spray 1 spray by Each Nostril route once daily.    losartan (COZAAR) 25 MG tablet     meloxicam (MOBIC) 15 MG tablet Take 1 tablet (15 mg total) by mouth once daily.    multivitamin-iron-folic acid Tab Take by mouth.    pravastatin (PRAVACHOL) 10 MG tablet Take 10 mg by mouth once daily.     testosterone cypionate (DEPOTESTOTERONE CYPIONATE) 200 mg/mL injection 0.8 ml injection weekly    aspirin 81 MG Chew Take 81 mg by mouth once daily.    gabapentin (NEURONTIN) 600 MG tablet Take 600 mg by mouth 2 (two) times daily.    gluc willis/chondro willis A/vit C/Mn (GLUCOSAMINE 1500 COMPLEX ORAL) Take by mouth.    multivitamin capsule Take 1 capsule by mouth once daily.    omeprazole (PRILOSEC) 40 MG capsule Take 1 capsule (40 mg total) by mouth once daily.    ondansetron (ZOFRAN-ODT) 8 MG TbDL Take 1 tablet (8 mg total) by mouth 3 (three) times daily.    oxymetazoline (AFRIN) 0.05 % nasal spray 2 sprays by Nasal route nightly as needed.     SLOW RELEASE IRON 144 mg (45 mg iron) TbSR Take 1 tablet with vitamin C 250 mg daily       Review of patient's allergies indicates:   Allergen Reactions    Cefaclor Hives     ceclor    Codeine Hives       Past Medical History:   Diagnosis Date    Arthritis     Colon polyps     History of colon polyps 4/14/2014    Hypertension      "Renal disorder     Sleep apnea     wears CPAP     Past Surgical History:   Procedure Laterality Date    CATARACT EXTRACTION W/  INTRAOCULAR LENS IMPLANT      curt    COLONOSCOPY N/A 10/7/2019    Procedure: COLONOSCOPY;  Surgeon: To Martinez Jr., MD;  Location: [facility] ENDO;  Service: Endoscopy;  Laterality: N/A;    COLONOSCOPY W/ POLYPECTOMY  9/26/2008  [facility]    One 1 to 2 mm polyp in the hepatic flexure.  TUBULAR ADENOMA.   Redundant colon.   Otherwise, the colon and term ileum is normal.     EYE SURGERY      cataract OU    EYE SURGERY Bilateral     eye lid    GASTRIC BYPASS  1999    INJECTION OF ANESTHETIC AGENT AROUND MEDIAL BRANCH NERVES INNERVATING CERVICAL FACET JOINT Bilateral 9/4/2024    Procedure: Block-nerve-medial branch-cervical    C3/4, C4/5;  Surgeon: Augie Hsu MD;  Location: [facility] OR;  Service: Pain Management;  Laterality: Bilateral;    LIPOSUCTION  2011    plastic surgery - abdominal dermolipectomy ("tummy tuck")    TRANSURETHRAL SURGICAL REMOVAL OF PROSTATE (TURP) USING GREEN LIGHT LASER N/A 10/26/2021    Procedure: TURP, USING GREEN LIGHT LASER;  Surgeon: Mikhail Owens MD;  Location: [facility] OR;  Service: Urology;  Laterality: N/A;    TRIGGER FINGER RELEASE Right 11/12/2020    Procedure: RELEASE, TRIGGER FINGER;  Surgeon: Mauricio Maravilla MD;  Location: [facility] OR;  Service: Orthopedics;  Laterality: Right;  Procedure:  Right middle finger trigger release    Position:  Supine    Anesthesia:  Local    Equipment:  Basic handset    VASECTOMY       Family History   Problem Relation Name Age of Onset    Diabetes Father      Heart disease Father          CAD stents    Hypertension Mother      Dementia Mother          early 70's     Social History     Tobacco Use    Smoking status: Former     Types: Cigarettes    Smokeless tobacco: Former   Substance Use Topics    Alcohol use: No    Drug use: No        Current Facility-Administered Medications:     lactated ringers infusion, , Intravenous, " Aracelis Severino Lowell B., MD    Facility-Administered Medications Ordered in Other Encounters:     diphenhydrAMINE injection 25 mg, 25 mg, Intravenous, Q6H PRN, Case Juarez MD    HYDROmorphone injection 0.5 mg, 0.5 mg, Intravenous, Q5 Min PRN, Case Juarez MD, 0.5 mg at 10/26/21 1235    lorazepam injection 0.25 mg, 0.25 mg, Intravenous, Once PRN, Case Juarez MD    ondansetron injection 4 mg, 4 mg, Intravenous, Daily PRN, Case Juarez MD    sodium chloride 0.9% bolus 250 mL, 250 mL, Intravenous, Once, Case Juarez MD    Review of Systems:  General ROS: negative for - fever  Dermatological ROS: negative for rash    OBJECTIVE:     Vital Signs (Most Recent):  Temp: 97.5 °F (36.4 °C) (10/16/24 1221)  Pulse: (!) 50 (10/16/24 1221)  Resp: 16 (10/16/24 1221)  BP: 139/69 (10/16/24 1221)  SpO2: 97 % (10/16/24 1221)  Body mass index is 35.7 kg/m².    Physical Exam:  General appearance - alert, well appearing, and in no distress  Mental status - AOx3  Eyes - pupils equal and reactive, extraocular eye movements intact  Heart - normal rate, regular rhythm, normal S1, S2, no murmurs, rubs, clicks or gallops  Chest - clear to auscultation, no wheezes, rales or rhonchi, symmetric air entry  Abdomen - soft, nontender, nondistended, no masses or organomegaly  Neurological - alert, oriented, normal speech, no focal findings or movement disorder noted  Extremities - peripheral pulses normal, no pedal edema, no clubbing or cyanosis      ASSESSMENT/PLAN:     There are no hospital problems to display for this patient.     No changes since seen on 9/9/24 except MRI reviewed. We will proceed with cervical C7-T1 MARIAM.  He reports he does not take aspirin.   The risks and benefits of this intervention, and alternative therapies were discussed with the patient.  The discussion of risks included infection, bleeding, need for additional procedures or surgery, nerve damage, paralysis, adverse medication  reaction(s), stroke, and/or death.  Questions regarding the procedure, risks, expected outcome, and possible side effects were solicited and answered to the patient's satisfaction.  Tex wishes to proceed with the injection.  Verbal and written consent were verified.  ASA 2, MP II      Proceed with intervention as scheduled.    Augie Hsu M.D.  Interventional Pain Medicine / Anesthesiology

## 2024-10-16 NOTE — OP NOTE
"Procedure Note    Procedure Date: 10/16/2024    Procedure Performed:  C7-T1 cervical interlaminar epidural steroid injection under fluoroscopy.    Indications:  Tex Pickard III presents with cervical radiculitis/radiculopathy secondary to disc herniation, osteophyte/osteophyte complexes, and/or severe degenerative disc disease producing foraminal or central spinal stenosis.  The pain has been present for at least 4 weeks and the patient has failed to respond to noninvasive conservative care.  Pain rated by NRS at baseline prior to intervention is 6/10.  Their radiculitis/radiculopathy and/or neurogenic claudication is severe enough to greatly impact their quality of life or function.      Pre-op diagnosis: Cervical Radiculitis/Radiculopathy    Post-op diagnosis: same    Physician: Augie Hsu MD    IV anxiolysis medications: versed 2mg    Medications injected: Dexamethasone 15mg, 3.5 mL sterile, preservative-free normal saline.    Local anesthetic used: 1% Lidocaine, 1 ml    Estimated Blood Loss: none    Complications:  none    Technique:  The patient was interviewed in the holding area and Risks/Benefits were discussed, including, but not limited to, the possibility of new or different pain, bleeding or infection.   All questions were answered.  The patient understood and accepted risks.  Consent was verfied.  A time-out was taken to identify patient and procedure prior to starting the procedure.  With the patient laying in a prone position with the neck in a mid-flexed forward position, the area was prepped and draped in the usual sterile fashion using ChloraPrep x2 and a fenestrated drape.  The area was determined under AP fluoroscopic guidance.  The skin and subcutaneous tissues overlying the targeted interspace were anesthetized with 3-5 mL of 1% Lidocaine using a 25G 1.5" needle.  A 20G, 3.5" Tuohy epidural needle was inserted through the anesthetized skin and directed toward the interspace under " fluoroscopic guidance until T1 lamina was contacted.  The fluoroscope was then adjusted to yield a contralateral oblique view of the C7-T1 interspace.  The epidural needle was incrementally walked cephalad off of the lamina until the ligamentum flavum was engaged. From this point, a loss-of-resistance technique was used to identify entrance of the needle into the epidural space.  Once the tip of the needle was in the desired position, the contrast dye Omnipaque was injected to determine placement and no vascular uptake.  Then, after negative aspiration, dexamethasone 15 mg + 3.5 cc NS was injected.  The needle was flushed with normal saline and removed. The contrast was seen to be displaced after injection. Patient was awake/responsive during all injections.  The patient tolerated the procedure well and was transferred to the [facility] in stable condition.  The patient was monitored after the procedure and was given post-procedure and discharge instructions to follow at home. The patient was discharged in a stable condition.

## 2024-10-16 NOTE — DISCHARGE SUMMARY
[facility]  Discharge Note  Short Stay    Admit Date: 10/16/2024    Discharge Date: 10/16/2024    Attending Physician: Augie Hsu     Discharge Provider: Augie Hsu    Diagnoses:  There are no hospital problems to display for this patient.      Discharged Condition: Good    Final Diagnoses: Cervical radiculopathy [M54.12]    Disposition: Home or Self Care    Hospital Course: No complications, uneventful    Outcome of Hospitalization, Treatment, Procedure, or Surgery:  Patient was admitted for outpatient interventional pain management procedure. The patient tolerated the procedure well with no complications.    Follow up/Patient Instructions:  Follow up as scheduled in Pain Management office in 2-3 weeks.  Patient has received instructions and follow up date and time.    Medications:  Continue previous medications    Discharge Procedure Orders   Notify your health care provider if you experience any of the following:  temperature >100.4     Notify your health care provider if you experience any of the following:  persistent nausea and vomiting or diarrhea     Notify your health care provider if you experience any of the following:  severe uncontrolled pain     Notify your health care provider if you experience any of the following:  redness, tenderness, or signs of infection (pain, swelling, redness, odor or green/yellow discharge around incision site)     Notify your health care provider if you experience any of the following:  difficulty breathing or increased cough     Notify your health care provider if you experience any of the following:  severe persistent headache     Notify your health care provider if you experience any of the following:  worsening rash     Notify your health care provider if you experience any of the following:  persistent dizziness, light-headedness, or visual disturbances     Notify your health care provider if you experience any of the following:  increased confusion or  weakness     Activity as tolerated

## 2024-10-17 VITALS
RESPIRATION RATE: 18 BRPM | HEART RATE: 54 BPM | HEIGHT: 71 IN | SYSTOLIC BLOOD PRESSURE: 123 MMHG | OXYGEN SATURATION: 98 % | DIASTOLIC BLOOD PRESSURE: 75 MMHG | BODY MASS INDEX: 35.84 KG/M2 | TEMPERATURE: 98 F | WEIGHT: 256 LBS

## 2024-10-30 ENCOUNTER — LAB VISIT (OUTPATIENT)
Dept: LAB | Facility: HOSPITAL | Age: 69
End: 2024-10-30
Attending: INTERNAL MEDICINE
Payer: COMMERCIAL

## 2024-10-30 DIAGNOSIS — R73.03 DIABETES MELLITUS, LATENT: ICD-10-CM

## 2024-10-30 DIAGNOSIS — E78.2 MIXED HYPERLIPIDEMIA: ICD-10-CM

## 2024-10-30 DIAGNOSIS — I51.9 HEART DISEASE, UNSPECIFIED: ICD-10-CM

## 2024-10-30 DIAGNOSIS — I49.3 VENTRICULAR PREMATURE BEATS: ICD-10-CM

## 2024-10-30 DIAGNOSIS — I25.10 CORONARY ATHEROSCLEROSIS OF NATIVE CORONARY ARTERY: Primary | ICD-10-CM

## 2024-10-30 DIAGNOSIS — I35.1 AORTIC INCOMPETENCE: ICD-10-CM

## 2024-10-30 LAB
25(OH)D3+25(OH)D2 SERPL-MCNC: 35 NG/ML (ref 30–96)
ALBUMIN SERPL BCP-MCNC: 4.1 G/DL (ref 3.5–5.2)
ALP SERPL-CCNC: 60 U/L (ref 40–150)
ALT SERPL W/O P-5'-P-CCNC: 59 U/L (ref 10–44)
ANION GAP SERPL CALC-SCNC: 8 MMOL/L (ref 8–16)
AST SERPL-CCNC: 35 U/L (ref 10–40)
BASOPHILS # BLD AUTO: 0.02 K/UL (ref 0–0.2)
BASOPHILS NFR BLD: 0.5 % (ref 0–1.9)
BILIRUB SERPL-MCNC: 0.8 MG/DL (ref 0.1–1)
BUN SERPL-MCNC: 16 MG/DL (ref 8–23)
CALCIUM SERPL-MCNC: 9.1 MG/DL (ref 8.7–10.5)
CHLORIDE SERPL-SCNC: 108 MMOL/L (ref 95–110)
CHOLEST SERPL-MCNC: 118 MG/DL (ref 120–199)
CHOLEST/HDLC SERPL: 3 {RATIO} (ref 2–5)
CO2 SERPL-SCNC: 26 MMOL/L (ref 23–29)
CREAT SERPL-MCNC: 1 MG/DL (ref 0.5–1.4)
DIFFERENTIAL METHOD BLD: NORMAL
EOSINOPHIL # BLD AUTO: 0.2 K/UL (ref 0–0.5)
EOSINOPHIL NFR BLD: 3.4 % (ref 0–8)
ERYTHROCYTE [DISTWIDTH] IN BLOOD BY AUTOMATED COUNT: 13.5 % (ref 11.5–14.5)
EST. GFR  (NO RACE VARIABLE): >60 ML/MIN/1.73 M^2
ESTIMATED AVG GLUCOSE: 126 MG/DL (ref 68–131)
FERRITIN SERPL-MCNC: 39 NG/ML (ref 20–300)
GLUCOSE SERPL-MCNC: 93 MG/DL (ref 70–110)
HBA1C MFR BLD: 6 % (ref 4–5.6)
HCT VFR BLD AUTO: 43 % (ref 40–54)
HCYS SERPL-SCNC: 6.3 UMOL/L (ref 4–16.5)
HDLC SERPL-MCNC: 40 MG/DL (ref 40–75)
HDLC SERPL: 33.9 % (ref 20–50)
HGB BLD-MCNC: 14.2 G/DL (ref 14–18)
IMM GRANULOCYTES # BLD AUTO: 0.02 K/UL (ref 0–0.04)
IMM GRANULOCYTES NFR BLD AUTO: 0.5 % (ref 0–0.5)
IRON SERPL-MCNC: 117 UG/DL (ref 45–160)
LDLC SERPL CALC-MCNC: 56.4 MG/DL (ref 63–159)
LYMPHOCYTES # BLD AUTO: 1.2 K/UL (ref 1–4.8)
LYMPHOCYTES NFR BLD: 27.3 % (ref 18–48)
MAGNESIUM SERPL-MCNC: 2.2 MG/DL (ref 1.6–2.6)
MCH RBC QN AUTO: 30.4 PG (ref 27–31)
MCHC RBC AUTO-ENTMCNC: 33 G/DL (ref 32–36)
MCV RBC AUTO: 92 FL (ref 82–98)
MONOCYTES # BLD AUTO: 0.3 K/UL (ref 0.3–1)
MONOCYTES NFR BLD: 6.6 % (ref 4–15)
NEUTROPHILS # BLD AUTO: 2.7 K/UL (ref 1.8–7.7)
NEUTROPHILS NFR BLD: 61.7 % (ref 38–73)
NONHDLC SERPL-MCNC: 78 MG/DL
NRBC BLD-RTO: 0 /100 WBC
PLATELET # BLD AUTO: 153 K/UL (ref 150–450)
PMV BLD AUTO: 11.1 FL (ref 9.2–12.9)
POTASSIUM SERPL-SCNC: 5.1 MMOL/L (ref 3.5–5.1)
PROT SERPL-MCNC: 7 G/DL (ref 6–8.4)
RBC # BLD AUTO: 4.67 M/UL (ref 4.6–6.2)
SATURATED IRON: 28 % (ref 20–50)
SODIUM SERPL-SCNC: 142 MMOL/L (ref 136–145)
T3FREE SERPL-MCNC: 2.8 PG/ML (ref 2.3–4.2)
T4 SERPL-MCNC: 6 UG/DL (ref 4.5–11.5)
TOTAL IRON BINDING CAPACITY: 420 UG/DL (ref 250–450)
TRANSFERRIN SERPL-MCNC: 284 MG/DL (ref 200–375)
TRIGL SERPL-MCNC: 108 MG/DL (ref 30–150)
TSH SERPL DL<=0.005 MIU/L-ACNC: 1.78 UIU/ML (ref 0.4–4)
WBC # BLD AUTO: 4.4 K/UL (ref 3.9–12.7)

## 2024-10-30 PROCEDURE — 84466 ASSAY OF TRANSFERRIN: CPT | Performed by: INTERNAL MEDICINE

## 2024-10-30 PROCEDURE — 83090 ASSAY OF HOMOCYSTEINE: CPT | Performed by: INTERNAL MEDICINE

## 2024-10-30 PROCEDURE — 82728 ASSAY OF FERRITIN: CPT | Performed by: INTERNAL MEDICINE

## 2024-10-30 PROCEDURE — 84481 FREE ASSAY (FT-3): CPT | Performed by: INTERNAL MEDICINE

## 2024-10-30 PROCEDURE — 84443 ASSAY THYROID STIM HORMONE: CPT | Performed by: INTERNAL MEDICINE

## 2024-10-30 PROCEDURE — 85025 COMPLETE CBC W/AUTO DIFF WBC: CPT | Performed by: INTERNAL MEDICINE

## 2024-10-30 PROCEDURE — 84436 ASSAY OF TOTAL THYROXINE: CPT | Performed by: INTERNAL MEDICINE

## 2024-10-30 PROCEDURE — 36415 COLL VENOUS BLD VENIPUNCTURE: CPT | Mod: PO | Performed by: INTERNAL MEDICINE

## 2024-10-30 PROCEDURE — 83036 HEMOGLOBIN GLYCOSYLATED A1C: CPT | Performed by: INTERNAL MEDICINE

## 2024-10-30 PROCEDURE — 82306 VITAMIN D 25 HYDROXY: CPT | Performed by: INTERNAL MEDICINE

## 2024-10-30 PROCEDURE — 83735 ASSAY OF MAGNESIUM: CPT | Performed by: INTERNAL MEDICINE

## 2024-10-30 PROCEDURE — 80061 LIPID PANEL: CPT | Performed by: INTERNAL MEDICINE

## 2024-10-30 PROCEDURE — 80053 COMPREHEN METABOLIC PANEL: CPT | Performed by: INTERNAL MEDICINE

## 2024-11-06 ENCOUNTER — OFFICE VISIT (OUTPATIENT)
Dept: PAIN MEDICINE | Facility: CLINIC | Age: 69
End: 2024-11-06
Payer: COMMERCIAL

## 2024-11-06 ENCOUNTER — HOSPITAL ENCOUNTER (OUTPATIENT)
Dept: RADIOLOGY | Facility: HOSPITAL | Age: 69
Discharge: HOME | End: 2024-11-06
Attending: ANESTHESIOLOGY
Payer: COMMERCIAL

## 2024-11-06 ENCOUNTER — TELEPHONE (OUTPATIENT)
Dept: PAIN MEDICINE | Facility: CLINIC | Age: 69
End: 2024-11-06

## 2024-11-06 VITALS
HEIGHT: 71 IN | DIASTOLIC BLOOD PRESSURE: 88 MMHG | WEIGHT: 250.44 LBS | HEART RATE: 62 BPM | SYSTOLIC BLOOD PRESSURE: 137 MMHG | BODY MASS INDEX: 35.06 KG/M2

## 2024-11-06 DIAGNOSIS — M79.18 MYOFASCIAL PAIN: ICD-10-CM

## 2024-11-06 DIAGNOSIS — M54.2 CERVICALGIA: Primary | ICD-10-CM

## 2024-11-06 DIAGNOSIS — M54.2 CERVICALGIA: ICD-10-CM

## 2024-11-06 PROCEDURE — 1125F AMNT PAIN NOTED PAIN PRSNT: CPT | Mod: CPTII,S$GLB,, | Performed by: ANESTHESIOLOGY

## 2024-11-06 PROCEDURE — 72052 X-RAY EXAM NECK SPINE 6/>VWS: CPT | Mod: 26,,, | Performed by: RADIOLOGY

## 2024-11-06 PROCEDURE — 1159F MED LIST DOCD IN RCRD: CPT | Mod: CPTII,S$GLB,, | Performed by: ANESTHESIOLOGY

## 2024-11-06 PROCEDURE — 3044F HG A1C LEVEL LT 7.0%: CPT | Mod: CPTII,S$GLB,, | Performed by: ANESTHESIOLOGY

## 2024-11-06 PROCEDURE — 3008F BODY MASS INDEX DOCD: CPT | Mod: CPTII,S$GLB,, | Performed by: ANESTHESIOLOGY

## 2024-11-06 PROCEDURE — 99214 OFFICE O/P EST MOD 30 MIN: CPT | Mod: S$GLB,,, | Performed by: ANESTHESIOLOGY

## 2024-11-06 PROCEDURE — 3079F DIAST BP 80-89 MM HG: CPT | Mod: CPTII,S$GLB,, | Performed by: ANESTHESIOLOGY

## 2024-11-06 PROCEDURE — 3288F FALL RISK ASSESSMENT DOCD: CPT | Mod: CPTII,S$GLB,, | Performed by: ANESTHESIOLOGY

## 2024-11-06 PROCEDURE — 1101F PT FALLS ASSESS-DOCD LE1/YR: CPT | Mod: CPTII,S$GLB,, | Performed by: ANESTHESIOLOGY

## 2024-11-06 PROCEDURE — 3075F SYST BP GE 130 - 139MM HG: CPT | Mod: CPTII,S$GLB,, | Performed by: ANESTHESIOLOGY

## 2024-11-06 PROCEDURE — 72052 X-RAY EXAM NECK SPINE 6/>VWS: CPT | Mod: TC,PO

## 2024-11-06 PROCEDURE — 4010F ACE/ARB THERAPY RXD/TAKEN: CPT | Mod: CPTII,S$GLB,, | Performed by: ANESTHESIOLOGY

## 2024-11-06 PROCEDURE — 1160F RVW MEDS BY RX/DR IN RCRD: CPT | Mod: CPTII,S$GLB,, | Performed by: ANESTHESIOLOGY

## 2024-11-06 PROCEDURE — 99999 PR PBB SHADOW E&M-EST. PATIENT-LVL IV: CPT | Mod: PBBFAC,,, | Performed by: ANESTHESIOLOGY

## 2024-11-06 NOTE — PROGRESS NOTES
[facility] Follow Up Evaluation      Referred by: No ref. provider found    PCP:     CC:   Chief Complaint   Patient presents with    Neck Pain          11/6/2024     8:41 AM 9/9/2024     8:43 AM 8/9/2024     1:19 PM   Last 3 PDI Scores   Pain Disability Index (PDI) 41 [phone] 27       Interval HPI 11/6/2024: Tex Pickard III returns to the office for follow up.  He is s/p cervical C7-T1 MARIAM on 10/16/24 without relief.  Today he reports his pain pain is still in the posterior aspect of his neck.  Not much pain up his occiput and not much pain radiating down into his arms.      HPI:   Tex Pickard III is a 69 y.o. male patient who has a past medical history of Arthritis, Colon polyps, History of colon polyps, Hypertension, Renal disorder, and Sleep apnea. He presents with neck pain.  He has a chronic history of neck pain for over the past 3 years.  Today he reports bilateral axial neck pain, constant, sharp, shooting, aching, 6/10.  He denies any radicular pain.  No numbness or weakness.  Pain is worse with neck rotation and neck extension      Pain Intervention History:  - s/p bilateral C3/4 and C4/5 diagnostic medial branch block on 09/04/2024 with 0% relief  - s/p cervical C7-T1 MARIAM on 10/16/24    Past Spine Surgical History:      Past and current medications:  Antineuropathics:  NSAIDs: mobic  Physical therapy: yes, completed   Antidepressants:  Muscle relaxers:  Opioids:  Antiplatelets/Anticoagulants: aspirin     History:    Current Outpatient Medications:     aspirin 81 MG Chew, Take 81 mg by mouth once daily., Disp: , Rfl:     BYSTOLIC 20 mg Tab, 20 mg. , Disp: , Rfl:     cholecalciferol, vitamin D3, 125 mcg (5,000 unit) capsule, Take 1 capsule (5,000 Units total) by mouth twice a week., Disp: , Rfl:     cyanocobalamin, vitamin B-12, (VITAMIN B-12) 2,500 mcg Subl, Place 2,500 mcg under the tongue every other day., Disp: , Rfl: 0    finasteride (PROPECIA) 1 mg tablet, Take 1 mg by mouth once daily.,  Disp: , Rfl: 3    fluticasone propionate (FLONASE) 50 mcg/actuation nasal spray, 1 spray by Each Nostril route once daily., Disp: , Rfl:     gluc willis/chondro willis A/vit C/Mn (GLUCOSAMINE 1500 COMPLEX ORAL), Take by mouth., Disp: , Rfl:     losartan (COZAAR) 25 MG tablet, , Disp: , Rfl:     meloxicam (MOBIC) 15 MG tablet, Take 1 tablet (15 mg total) by mouth once daily., Disp: 30 tablet, Rfl: 2    multivitamin capsule, Take 1 capsule by mouth once daily., Disp: , Rfl:     multivitamin-iron-folic acid Tab, Take by mouth., Disp: , Rfl:     omeprazole (PRILOSEC) 40 MG capsule, Take 1 capsule (40 mg total) by mouth once daily., Disp: 30 capsule, Rfl: 11    ondansetron (ZOFRAN-ODT) 8 MG TbDL, Take 1 tablet (8 mg total) by mouth 3 (three) times daily., Disp: 30 tablet, Rfl: 1    oxymetazoline (AFRIN) 0.05 % nasal spray, 2 sprays by Nasal route nightly as needed. , Disp: , Rfl:     pravastatin (PRAVACHOL) 10 MG tablet, Take 10 mg by mouth once daily. , Disp: , Rfl:     SLOW RELEASE IRON 144 mg (45 mg iron) TbSR, Take 1 tablet with vitamin C 250 mg daily, Disp: , Rfl:     testosterone cypionate (DEPOTESTOTERONE CYPIONATE) 200 mg/mL injection, 0.8 ml injection weekly, Disp: 10 mL, Rfl: 5    gabapentin (NEURONTIN) 600 MG tablet, Take 600 mg by mouth 2 (two) times daily., Disp: , Rfl:   No current facility-administered medications for this visit.    Facility-Administered Medications Ordered in Other Visits:     diphenhydrAMINE injection 25 mg, 25 mg, Intravenous, Q6H PRN, Case Juarez MD    HYDROmorphone injection 0.5 mg, 0.5 mg, Intravenous, Q5 Min PRN, Case Juarez MD, 0.5 mg at 10/26/21 1235    lorazepam injection 0.25 mg, 0.25 mg, Intravenous, Once PRN, Case Juarez MD    ondansetron injection 4 mg, 4 mg, Intravenous, Daily PRN, Case Juarez MD    sodium chloride 0.9% bolus 250 mL, 250 mL, Intravenous, Once, Case Juarez MD    Past Medical History:   Diagnosis Date    Arthritis     Colon  "polyps     History of colon polyps 4/14/2014    Hypertension     Renal disorder     Sleep apnea     wears CPAP       Past Surgical History:   Procedure Laterality Date    CATARACT EXTRACTION W/  INTRAOCULAR LENS IMPLANT      curt    COLONOSCOPY N/A 10/7/2019    Procedure: COLONOSCOPY;  Surgeon: To Martinez Jr., MD;  Location: [facility] ENDO;  Service: Endoscopy;  Laterality: N/A;    COLONOSCOPY W/ POLYPECTOMY  9/26/2008  Juan    One 1 to 2 mm polyp in the hepatic flexure.  TUBULAR ADENOMA.   Redundant colon.   Otherwise, the colon and term ileum is normal.     EPIDURAL STEROID INJECTION INTO CERVICAL SPINE N/A 10/16/2024    Procedure: Injection-steroid-epidural-cervical     C7/T1;  Surgeon: Augie Hsu MD;  Location: [facility] OR;  Service: Pain Management;  Laterality: N/A;    EYE SURGERY      cataract OU    EYE SURGERY Bilateral     eye lid    GASTRIC BYPASS  1999    INJECTION OF ANESTHETIC AGENT AROUND MEDIAL BRANCH NERVES INNERVATING CERVICAL FACET JOINT Bilateral 9/4/2024    Procedure: Block-nerve-medial branch-cervical    C3/4, C4/5;  Surgeon: Augie Hsu MD;  Location: [facility] OR;  Service: Pain Management;  Laterality: Bilateral;    LIPOSUCTION  2011    plastic surgery - abdominal dermolipectomy ("tummy tuck")    TRANSURETHRAL SURGICAL REMOVAL OF PROSTATE (TURP) USING GREEN LIGHT LASER N/A 10/26/2021    Procedure: TURP, USING GREEN LIGHT LASER;  Surgeon: Mikhail Owens MD;  Location: [facility] OR;  Service: Urology;  Laterality: N/A;    TRIGGER FINGER RELEASE Right 11/12/2020    Procedure: RELEASE, TRIGGER FINGER;  Surgeon: Mauricio Maravilla MD;  Location: [facility] OR;  Service: Orthopedics;  Laterality: Right;  Procedure:  Right middle finger trigger release    Position:  Supine    Anesthesia:  Local    Equipment:  Basic handset    VASECTOMY         Family History   Problem Relation Name Age of Onset    Diabetes Father      Heart disease Father          CAD stents    Hypertension Mother      Dementia Mother   " "       early 70's       Social History     Socioeconomic History    Marital status:    Occupational History    Occupation: contractor   Tobacco Use    Smoking status: Former     Types: Cigarettes    Smokeless tobacco: Former   Substance and Sexual Activity    Alcohol use: No    Drug use: No     Social Drivers of Health     Financial Resource Strain: Low Risk  (4/17/2023)    Overall Financial Resource Strain (CARDIA)     Difficulty of Paying Living Expenses: Not hard at all   Food Insecurity: No Food Insecurity (4/17/2023)    Hunger Vital Sign     Worried About Running Out of Food in the Last Year: Never true     Ran Out of Food in the Last Year: Never true   Transportation Needs: Patient Declined (4/17/2023)    PRAPARE - Transportation     Lack of Transportation (Medical): Patient declined     Lack of Transportation (Non-Medical): Patient declined   Physical Activity: Sufficiently Active (4/17/2023)    Exercise Vital Sign     Days of Exercise per Week: 3 days     Minutes of Exercise per Session: 60 min   Stress: No Stress Concern Present (4/17/2023)    [employer] [place] [employer] - Occupational Stress Questionnaire     Feeling of Stress : Not at all   Housing Stability: Low Risk  (4/17/2023)    Housing Stability Vital Sign     Unable to Pay for Housing in the Last Year: No     Number of Places Lived in the Last Year: 1     Unstable Housing in the Last Year: No       Review of patient's allergies indicates:   Allergen Reactions    Cefaclor Hives     ceclor    Codeine Hives       Review of Systems:  12 point review of systems is negative.    Physical Exam:  Vitals:    11/06/24 0842   BP: 137/88   Pulse: 62   Weight: 113.6 kg (250 lb 7.1 oz)   Height: 5' 11" (1.803 m)   PainSc:   8   PainLoc: Neck       Body mass index is 34.93 kg/m².    Gen: NAD  Psych: mood appropriate for given condition  HEENT: eyes anicteric   CV: RRR  HEENT: anicteric   Respiratory: non-labored, no signs of respiratory " distress  Abd: non-distended  Skin: warm, dry and intact.  Gait: No antalgic gait.     Reproducible pain with cervical axial facet loading    Sensory:  Intact and symmetrical to light touch in C4-T1 dermatomes bilaterally.     Motor:    Right Left   C4 Shoulder Abduction  5  5   C5 Elbow Flexion    5  5   C6 Wrist Extension  5  5   C7 Elbow Extension   5  5   C8/T1 Hand Intrinsics   5  5      Right Left   Triceps DTR 1+ 1+   Biceps DTR 0 0        Patellar DTR 1+ 1+        Ryan Absent  Absent                 Labs:  Lab Results   Component Value Date    HGBA1C 6.0 (H) 10/30/2024       Lab Results   Component Value Date    WBC 4.40 10/30/2024    HGB 14.2 10/30/2024    HCT 43.0 10/30/2024    MCV 92 10/30/2024     10/30/2024           Imaging:  Xray cervical spine 11/6/24  FINDINGS:  Cervical vertebral bodies maintain normal height and alignment.  No acute fracture or subluxation.  No abnormal translation with flexion or extension.  Multilevel bulky anterior marginal osteophyte formation.  Moderate intervertebral disc space narrowing at C6-7 and mild disc space narrowing at C5-6.  Multilevel facet arthropathy.  Degenerative change at the left C1-2 articulation.  Multilevel mild neural foraminal stenosis.  Prevertebral soft tissues are normal.    MRI cervical spine 10/1/24  FINDINGS:  The visualized posterior fossa structures including the shalini, mid brain, and cerebellum are unremarkable.  No Chiari malformation.     There is straightening of the normal cervical lordosis which could relate to neck muscle spasm.  The cervical vertebral bodies show normal height and signal intensity without evidence of acute compression fracture or pathologic marrow replacement process.  There is an incidentally observed vertebral body hemangioma present in the posterior aspect of C6 on series 3 and 4 image 8.  No spondylolisthesis.     There is degenerative disc desiccation present at every cervical level.  There is marginal  osteophyte formation and disc space narrowing present at C5-C6 and C6-C7..     The cervical spinal cord is normal in signal intensity without evidence of cord edema, myelomalacia, or cord syrinx.  No epidural fluid collections or masses.     The incidentally observed soft tissues of the neck show no significant abnormalities.  C2-C3: There is hypertrophic right facet arthropathy and there is mild-moderate right neuroforaminal stenosis.  No left neuroforaminal stenosis.  There is mild ligamentum flavum thickening.  No overall central canal stenosis.  C3-C4: There is a broad central disc protrusion which effaces the anterior CSF sleeve.  No central canal stenosis or neuroforaminal stenosis.  No disc extrusion.  C4-C5: There is a broad central disc protrusion.  No central canal stenosis or neuroforaminal stenosis.  C5-C6: There is a broad central/right paracentral disc protrusion which effaces the anterior CSF sleeve and flattens the ventral cord.  No abnormal cord signal.  There is bilateral uncovertebral spurring present.  No overall central canal stenosis or neuroforaminal stenosis.  C6-C7: There is a posterior disc osteophyte complex and bilateral uncovertebral spurring which effaces the anterior CSF sleeve.  There is mild right neuroforaminal stenosis.  There is effacement of the anterior CSF sleeve.  No left neuroforaminal stenosis or overall central canal stenosis.  C7-T1: No disc protrusion or extrusion. No central canal stenosis or neuroforaminal stenosis.      Assessment:   Problem List Items Addressed This Visit    None  Visit Diagnoses       Cervicalgia    -  Primary    Relevant Orders    X-Ray Cervical Spine 5 View With Flex And Ext (Completed)    Myofascial pain                  Tex Pickard III is a 69 y.o. male patient who has a past medical history of Arthritis, Colon polyps, History of colon polyps, Hypertension, Renal disorder, and Sleep apnea. He presents with neck pain.  He has a chronic history  of neck pain for over the past 3 years.  Today he reports bilateral axial neck pain, constant, sharp, shooting, aching, 6/10.  He denies any radicular pain.  No numbness or weakness.  Pain is worse with neck rotation and neck extension      11/6/24 - Tex Pickard III returns to the office for follow up.  He is s/p cervical C7-T1 MARIAM on 10/16/24 without relief.  Today he reports his pain pain is still in the posterior aspect of his neck.  Not much pain up his occiput and not much pain radiating down into his arms.    - on exam he has full strength in his upper extremities and intact sensation to light touch bilateral C4-T1.   - He is s/p bilateral C3/4 and C4/5 diagnostic medial branch block on 09/04/2024 with 0% relief.   - unfortunately has also failed to find relief with cervical epidural injection  - cervical x-rays consistent with multilevel bilateral facet arthropathy  - over the past 8 weeks he has been participating in formal physical therapy however he continues to hav neck pain that is limiting his mobility and interfering with the quality of life  - I am going to try trigger point injections to see if that provides him with any relief.  We will have him follow up in the office for this.      : Not applicable      This note was completed with dictation software and grammatical errors may exist.

## 2024-11-06 NOTE — TELEPHONE ENCOUNTER
Please let the patient know I reviewed his x-ray.    I would be willing to do trigger point injections around the muscles for him to see if this makes a difference.  Can you please schedule him a follow up in the office with me to do this.  It can be scheduled has a procedure visit for TPI.

## 2024-11-08 ENCOUNTER — PATIENT MESSAGE (OUTPATIENT)
Dept: PAIN MEDICINE | Facility: CLINIC | Age: 69
End: 2024-11-08
Payer: COMMERCIAL

## 2024-11-08 ENCOUNTER — TELEPHONE (OUTPATIENT)
Dept: PAIN MEDICINE | Facility: CLINIC | Age: 69
End: 2024-11-08
Payer: COMMERCIAL

## 2024-11-08 NOTE — TELEPHONE ENCOUNTER
Spoke with patient and confirmed that he will be able to come into clinic on 11/12 for trigger point injections    ----- Message from Filomena sent at 11/8/2024 11:00 AM CST -----  Contact: self  Type:  Patient Returning Call    Who Called:  the patient  Who Left Message for Patient:  Bruna  Does the patient know what this is regarding?:  yes  Best Call Back Number:  [phone]  Additional Information:  ringer was off, it is on now and he has a few extra questions. Please call

## 2024-11-12 ENCOUNTER — PROCEDURE VISIT (OUTPATIENT)
Dept: PAIN MEDICINE | Facility: CLINIC | Age: 69
End: 2024-11-12
Payer: COMMERCIAL

## 2024-11-12 VITALS
HEART RATE: 78 BPM | HEIGHT: 71 IN | BODY MASS INDEX: 35.3 KG/M2 | DIASTOLIC BLOOD PRESSURE: 77 MMHG | WEIGHT: 252.13 LBS | SYSTOLIC BLOOD PRESSURE: 116 MMHG

## 2024-11-12 DIAGNOSIS — M79.18 MYOFASCIAL PAIN: Primary | ICD-10-CM

## 2024-11-12 PROCEDURE — 20553 NJX 1/MLT TRIGGER POINTS 3/>: CPT | Mod: S$GLB,,, | Performed by: ANESTHESIOLOGY

## 2024-11-12 RX ORDER — METHYLPREDNISOLONE ACETATE 40 MG/ML
10 INJECTION, SUSPENSION INTRA-ARTICULAR; INTRALESIONAL; INTRAMUSCULAR; SOFT TISSUE
Status: DISCONTINUED | OUTPATIENT
Start: 2024-11-12 | End: 2024-11-12 | Stop reason: HOSPADM

## 2024-11-12 RX ORDER — METHYLPREDNISOLONE ACETATE 40 MG/ML
40 INJECTION, SUSPENSION INTRA-ARTICULAR; INTRALESIONAL; INTRAMUSCULAR; SOFT TISSUE
Status: COMPLETED | OUTPATIENT
Start: 2024-11-12 | End: 2024-11-12

## 2024-11-12 RX ADMIN — METHYLPREDNISOLONE ACETATE 10 MG: 40 INJECTION, SUSPENSION INTRA-ARTICULAR; INTRALESIONAL; INTRAMUSCULAR; SOFT TISSUE at 09:11

## 2024-11-12 RX ADMIN — METHYLPREDNISOLONE ACETATE 40 MG: 40 INJECTION, SUSPENSION INTRA-ARTICULAR; INTRALESIONAL; INTRAMUSCULAR; SOFT TISSUE at 10:11

## 2024-11-12 NOTE — PROCEDURES
Trigger Point Injection    Performed by: Augie Hsu MD  Authorized by: Augie Hsu MD    Cervical Paraspinal:  Bilateral  Trapezus:  Bilateral    Consent Done?:  Yes (Written)    Pre-Procedure:   Indications:  Pain relief    Local anesthesia used?: Yes    Local anesthetic:  Bupivacaine 0.25% without epinephrine and bupivacaine 0.5% without epinephrine  Anesthetic total (ml):  8    Medications: 10 mg methylPREDNISolone acetate 40 mg/mL

## 2024-11-19 DIAGNOSIS — M54.2 MYOFASCIAL NECK PAIN: ICD-10-CM

## 2024-11-19 DIAGNOSIS — M47.812 CERVICAL FACET SYNDROME: ICD-10-CM

## 2024-11-22 RX ORDER — MELOXICAM 15 MG/1
TABLET ORAL
Qty: 30 TABLET | Refills: 0 | Status: SHIPPED | OUTPATIENT
Start: 2024-11-22

## 2024-12-05 DIAGNOSIS — M79.18 MYOFASCIAL PAIN: ICD-10-CM

## 2024-12-06 RX ORDER — CYCLOBENZAPRINE HCL 5 MG
TABLET ORAL
Qty: 60 TABLET | Refills: 1 | Status: SHIPPED | OUTPATIENT
Start: 2024-12-06

## 2024-12-26 DIAGNOSIS — M47.812 CERVICAL FACET SYNDROME: ICD-10-CM

## 2024-12-26 DIAGNOSIS — M54.2 MYOFASCIAL NECK PAIN: ICD-10-CM

## 2024-12-30 RX ORDER — MELOXICAM 15 MG/1
TABLET ORAL
Qty: 30 TABLET | Refills: 0 | Status: SHIPPED | OUTPATIENT
Start: 2024-12-30

## 2025-02-17 ENCOUNTER — TELEPHONE (OUTPATIENT)
Dept: GASTROENTEROLOGY | Facility: CLINIC | Age: 70
End: 2025-02-17
Payer: COMMERCIAL

## 2025-02-17 NOTE — TELEPHONE ENCOUNTER
Returned call to the patient, patient scheduled for colonoscopy, prep instructions sent to patient portal, patient verbalized understanding of this.

## 2025-02-17 NOTE — TELEPHONE ENCOUNTER
----- Message from Seema sent at 2/17/2025  2:20 PM CST -----  Type:  Appointment Request Name of Caller:LOU DAWSON III [[id number]]When is the first available appointment?No accessWould the patient rather a call back or a response via [facility]? Call back Best Call Back Number:[phone] Additional Information: Patient states he would like to schedule a colonoscopy at the soonest availability. Patient would like a call back with further assistance.

## 2025-03-10 DIAGNOSIS — M47.812 CERVICAL FACET SYNDROME: ICD-10-CM

## 2025-03-10 DIAGNOSIS — M54.2 MYOFASCIAL NECK PAIN: ICD-10-CM

## 2025-03-10 RX ORDER — MELOXICAM 15 MG/1
TABLET ORAL
Qty: 30 TABLET | Refills: 0 | Status: SHIPPED | OUTPATIENT
Start: 2025-03-10

## 2025-04-08 DIAGNOSIS — M54.2 MYOFASCIAL NECK PAIN: ICD-10-CM

## 2025-04-08 DIAGNOSIS — M47.812 CERVICAL FACET SYNDROME: ICD-10-CM

## 2025-04-09 RX ORDER — MELOXICAM 15 MG/1
TABLET ORAL
Qty: 30 TABLET | Refills: 0 | Status: SHIPPED | OUTPATIENT
Start: 2025-04-09

## 2025-04-09 NOTE — TELEPHONE ENCOUNTER
Can we do a virtual for med refill please? Or in person if he cannot do virtual. Sakina sent in a month of meloxicam in the meantime.

## 2025-05-12 ENCOUNTER — TELEPHONE (OUTPATIENT)
Dept: SURGERY | Facility: HOSPITAL | Age: 70
End: 2025-05-12
Payer: COMMERCIAL

## 2025-05-12 NOTE — TELEPHONE ENCOUNTER
Spoke with patient who states he needs to reschedule his procedure due to transportation. He says late June may work better for him. Please contact patient to reschedule procedure.

## 2025-05-15 DIAGNOSIS — M54.2 MYOFASCIAL NECK PAIN: ICD-10-CM

## 2025-05-15 DIAGNOSIS — M47.812 CERVICAL FACET SYNDROME: ICD-10-CM

## 2025-05-16 RX ORDER — MELOXICAM 15 MG/1
TABLET ORAL
Qty: 30 TABLET | Refills: 0 | Status: SHIPPED | OUTPATIENT
Start: 2025-05-16

## 2025-06-09 DIAGNOSIS — M54.2 MYOFASCIAL NECK PAIN: ICD-10-CM

## 2025-06-09 DIAGNOSIS — M47.812 CERVICAL FACET SYNDROME: ICD-10-CM

## 2025-06-12 RX ORDER — MELOXICAM 15 MG/1
TABLET ORAL
Qty: 30 TABLET | Refills: 2 | Status: SHIPPED | OUTPATIENT
Start: 2025-06-12

## 2025-07-07 NOTE — H&P
History & Physical - Short Stay  Gastroenterology      SUBJECTIVE:     Procedure: Colonoscopy    Chief Complaint/Indication for Procedure: Surveillance, Hx of colon polyps.    History of Present Illness:  Asymptomatic    See recent phone note:  JigarEuniceSUNSHINE    2/17/25  2:35 PM  Note  ----- Message from Seema sent at 2/17/2025  2:20 PM CST -----  Type:  Appointment Request Name of Caller:LOU DAWSON III [[id number]]  When is the first available appointment?No accessWould the patient rather a call back or a response via [facility]?   Call back Best Call Back Number:[phone]          Additional Information: Patient states he would like to schedule a colonoscopy at the soonest availability. Patient would like a call back with further assistance.          See last Colonoscopy 10/7/2019:  Indications:        High risk colon cancer surveillance: Personal                        history of colonic polyps, Last colonoscopy: April 14, 2014   Providers:           To Martinez MD   Impression:  - One 2 mm polyp in the proximal transverse colon,                        removed with a cold snare. Resected and retrieved.                        - One 2 mm polyp in the proximal ascending colon,                        removed with a cold snare. Resected and retrieved.                        - Redundant colon.                        - Non-bleeding internal hemorrhoids.                        - The examination was otherwise normal.                        - The examined portion of the ileum was normal.   Recommendation:      - Discharge patient to home.                        - Await pathology results.                        - If the pathology report reveals adenomatous                        tissue, then repeat the colonoscopy for surveillance in 5 years.                        - If the pathology report indicates hyperplastic                        polyp, then repeat colonoscopy for surveillance in 7 years.                         - High fiber diet.                        - Use fiber, for example Citrucel, Fibercon, Konsyl or Metamucil.                        - Take a PROBIOTIC, such as a carton of GREEK YOGURT                        (Chobani or Oikos, or Activia or Dannon); or tablets                        of ALIGN or CULTURELLE or LOUIS-Q (all                        non-prescription), every day for a month.                        - Call the [facility] clinic in 2 weeks for reports (if                        you haven't heard from us sooner) [phone].                        - Continue present medications.                        - Return to normal activities tomorrow.   To Martinez MD   10/7/2019     SPECIMEN   1) Transverse polyp.   2) Ascending polyp.   FINAL PATHOLOGIC DIAGNOSIS   1. Transverse colon, polyp, biopsy: Hyperplastic polyp.   2.  Ascending colon, polyp, biopsy: Tubular adenoma.        PTA Medications   Medication Sig    aspirin 81 MG Chew Take 81 mg by mouth once daily.    BYSTOLIC 20 mg Tab 20 mg.     finasteride (PROPECIA) 1 mg tablet Take 1 mg by mouth once daily.    losartan (COZAAR) 25 MG tablet     meloxicam (MOBIC) 15 MG tablet TAKE 1 TABLET(15 MG) BY MOUTH ONCE DAILY    omeprazole (PRILOSEC) 40 MG capsule Take 1 capsule (40 mg total) by mouth once daily.    pravastatin (PRAVACHOL) 10 MG tablet Take 10 mg by mouth once daily.     cholecalciferol, vitamin D3, 125 mcg (5,000 unit) capsule Take 1 capsule (5,000 Units total) by mouth twice a week.    cyanocobalamin, vitamin B-12, (VITAMIN B-12) 2,500 mcg Subl Place 2,500 mcg under the tongue every other day.    cyclobenzaprine (FLEXERIL) 5 MG tablet TAKE 1 TABLET(5 MG) BY MOUTH TWICE DAILY AS NEEDED FOR MUSCLE SPASMS    fluticasone propionate (FLONASE) 50 mcg/actuation nasal spray 1 spray by Each Nostril route once daily.    gabapentin (NEURONTIN) 600 MG tablet Take 600 mg by mouth 2 (two) times daily.    gluc willis/chondro wilils A/vit C/Mn (GLUCOSAMINE 1500  "COMPLEX ORAL) Take by mouth.    multivitamin capsule Take 1 capsule by mouth once daily.    multivitamin-iron-folic acid Tab Take by mouth.    ondansetron (ZOFRAN-ODT) 8 MG TbDL Take 1 tablet (8 mg total) by mouth 3 (three) times daily.    oxymetazoline (AFRIN) 0.05 % nasal spray 2 sprays by Nasal route nightly as needed.     SLOW RELEASE IRON 144 mg (45 mg iron) TbSR Take 1 tablet with vitamin C 250 mg daily    testosterone cypionate (DEPOTESTOTERONE CYPIONATE) 200 mg/mL injection 0.8 ml injection weekly       Review of patient's allergies indicates:   Allergen Reactions    Cefaclor Hives     ceclor    Codeine Hives        Past Medical History:   Diagnosis Date    Arthritis     Colon polyps     History of colon polyps 4/14/2014    Hypertension     Renal disorder     Sleep apnea     wears CPAP     Past Surgical History:   Procedure Laterality Date    CATARACT EXTRACTION W/  INTRAOCULAR LENS IMPLANT      curt    COLONOSCOPY N/A 10/7/2019    Procedure: COLONOSCOPY;  Surgeon: To Martinez Jr., MD;  Location: [facility] ENDO;  Service: Endoscopy;  Laterality: N/A;    COLONOSCOPY W/ POLYPECTOMY  9/26/2008  Juan    One 1 to 2 mm polyp in the hepatic flexure.  TUBULAR ADENOMA.   Redundant colon.   Otherwise, the colon and term ileum is normal.     EPIDURAL STEROID INJECTION INTO CERVICAL SPINE N/A 10/16/2024    Procedure: Injection-steroid-epidural-cervical     C7/T1;  Surgeon: Augie Hsu MD;  Location: [facility] OR;  Service: Pain Management;  Laterality: N/A;    EYE SURGERY      cataract OU    EYE SURGERY Bilateral     eye lid    GASTRIC BYPASS  1999    INJECTION OF ANESTHETIC AGENT AROUND MEDIAL BRANCH NERVES INNERVATING CERVICAL FACET JOINT Bilateral 9/4/2024    Procedure: Block-nerve-medial branch-cervical    C3/4, C4/5;  Surgeon: Augie Hsu MD;  Location: [facility] OR;  Service: Pain Management;  Laterality: Bilateral;    LIPOSUCTION  2011    plastic surgery - abdominal dermolipectomy ("tummy tuck")    TRANSURETHRAL " "SURGICAL REMOVAL OF PROSTATE (TURP) USING GREEN LIGHT LASER N/A 10/26/2021    Procedure: TURP, USING GREEN LIGHT LASER;  Surgeon: Mikhail Owens MD;  Location: [facility] OR;  Service: Urology;  Laterality: N/A;    TRIGGER FINGER RELEASE Right 11/12/2020    Procedure: RELEASE, TRIGGER FINGER;  Surgeon: Mauricio Maravilla MD;  Location: [facility] OR;  Service: Orthopedics;  Laterality: Right;  Procedure:  Right middle finger trigger release    Position:  Supine    Anesthesia:  Local    Equipment:  Basic handset    VASECTOMY       Family History   Problem Relation Name Age of Onset    Diabetes Father      Heart disease Father          CAD stents    Hypertension Mother      Dementia Mother          early 70's     Social History[1]      OBJECTIVE:     Vital Signs (Most Recent)  Temp: 97 °F (36.1 °C) (07/08/25 0933)  Pulse: (!) 50 (07/08/25 0933)  Resp: 15 (07/08/25 0933)  BP: (!) 145/70 (07/08/25 0933)  SpO2: 98 % (07/08/25 0933)    Physical Exam:  : Ht: 5' 11" (180.3 cm)   Wt: 108.9 kg   BMI: 33.47 kg/m²  .                                                       GENERAL:  Comfortable, in no acute distress.                                 HEENT EXAM:  Nonicteric.  No adenopathy.  Oropharynx is clear.               NECK:  Supple.                                                               LUNGS:  Clear.                                                               CARDIAC:  Regular rate and rhythm.  S1, S2.  No murmur.                      ABDOMEN:  Obese.  Soft, positive bowel sounds, nontender.  No hepatosplenomegaly or masses.  No rebound or guarding.                                             EXTREMITIES:  No edema.     MENTAL STATUS:  Alert and oriented.    ASSESSMENT/PLAN:     Assessment: Surveillance, Hx of colon polyps.    Plan: Colonoscopy    Anesthesia Plan:   MAC / General Anaesthesia    ASA Grade: ASA 2 - Patient with mild systemic disease with no functional limitations    MALLAMPATI SCORE: II (hard and soft " palate, upper portion of tonsils anduvula visible)         [1]   Social History  Tobacco Use    Smoking status: Former     Types: Cigarettes    Smokeless tobacco: Former     Types: Chew   Substance Use Topics    Alcohol use: No    Drug use: No

## 2025-07-08 ENCOUNTER — ANESTHESIA (OUTPATIENT)
Dept: ENDOSCOPY | Facility: HOSPITAL | Age: 70
End: 2025-07-08
Payer: COMMERCIAL

## 2025-07-08 ENCOUNTER — ANESTHESIA EVENT (OUTPATIENT)
Dept: ENDOSCOPY | Facility: HOSPITAL | Age: 70
End: 2025-07-08
Payer: COMMERCIAL

## 2025-07-08 ENCOUNTER — HOSPITAL ENCOUNTER (OUTPATIENT)
Facility: HOSPITAL | Age: 70
Discharge: HOME | End: 2025-07-08
Attending: INTERNAL MEDICINE | Admitting: INTERNAL MEDICINE
Payer: COMMERCIAL

## 2025-07-08 DIAGNOSIS — Z86.0100 HX OF COLONIC POLYPS: ICD-10-CM

## 2025-07-08 PROCEDURE — 63600175 PHARM REV CODE 636 W HCPCS: Mod: PO | Performed by: INTERNAL MEDICINE

## 2025-07-08 PROCEDURE — 45380 COLONOSCOPY AND BIOPSY: CPT | Mod: 22,33,, | Performed by: INTERNAL MEDICINE

## 2025-07-08 PROCEDURE — 37000009 HC ANESTHESIA EA ADD 15 MINS: Mod: PO | Performed by: INTERNAL MEDICINE

## 2025-07-08 PROCEDURE — 45380 COLONOSCOPY AND BIOPSY: CPT | Mod: 33,PO | Performed by: INTERNAL MEDICINE

## 2025-07-08 PROCEDURE — 37000008 HC ANESTHESIA 1ST 15 MINUTES: Mod: PO | Performed by: INTERNAL MEDICINE

## 2025-07-08 PROCEDURE — 63600175 PHARM REV CODE 636 W HCPCS: Mod: PO | Performed by: ANESTHESIOLOGY

## 2025-07-08 PROCEDURE — 27201012 HC FORCEPS, HOT/COLD, DISP: Mod: PO | Performed by: INTERNAL MEDICINE

## 2025-07-08 RX ORDER — SODIUM CHLORIDE 0.9 % (FLUSH) 0.9 %
10 SYRINGE (ML) INJECTION
Status: DISCONTINUED | OUTPATIENT
Start: 2025-07-08 | End: 2025-07-08 | Stop reason: HOSPADM

## 2025-07-08 RX ORDER — LIDOCAINE HYDROCHLORIDE 20 MG/ML
INJECTION INTRAVENOUS
Status: DISCONTINUED | OUTPATIENT
Start: 2025-07-08 | End: 2025-07-08

## 2025-07-08 RX ORDER — PROPOFOL 10 MG/ML
VIAL (ML) INTRAVENOUS
Status: DISCONTINUED | OUTPATIENT
Start: 2025-07-08 | End: 2025-07-08

## 2025-07-08 RX ORDER — SODIUM CHLORIDE, SODIUM LACTATE, POTASSIUM CHLORIDE, CALCIUM CHLORIDE 600; 310; 30; 20 MG/100ML; MG/100ML; MG/100ML; MG/100ML
INJECTION, SOLUTION INTRAVENOUS CONTINUOUS
Status: DISCONTINUED | OUTPATIENT
Start: 2025-07-08 | End: 2025-07-08 | Stop reason: HOSPADM

## 2025-07-08 RX ADMIN — PROPOFOL 20 MG: 10 INJECTION, EMULSION INTRAVENOUS at 10:07

## 2025-07-08 RX ADMIN — PROPOFOL 50 MG: 10 INJECTION, EMULSION INTRAVENOUS at 10:07

## 2025-07-08 RX ADMIN — LIDOCAINE HYDROCHLORIDE 100 MG: 20 INJECTION INTRAVENOUS at 10:07

## 2025-07-08 RX ADMIN — PROPOFOL 100 MG: 10 INJECTION, EMULSION INTRAVENOUS at 10:07

## 2025-07-08 RX ADMIN — SODIUM CHLORIDE, POTASSIUM CHLORIDE, SODIUM LACTATE AND CALCIUM CHLORIDE: 600; 310; 30; 20 INJECTION, SOLUTION INTRAVENOUS at 10:07

## 2025-07-08 RX ADMIN — PROPOFOL 30 MG: 10 INJECTION, EMULSION INTRAVENOUS at 10:07

## 2025-07-08 NOTE — ANESTHESIA POSTPROCEDURE EVALUATION
Anesthesia Post Evaluation    Patient: Tex Pickard III    Procedure(s) Performed: Procedure(s) (LRB):  COLONOSCOPY, SCREENING, HIGH RISK PATIENT (N/A)    Final Anesthesia Type: general      Patient location during evaluation: PACU  Patient participation: Yes- Able to Participate  Level of consciousness: awake and alert and oriented  Post-procedure vital signs: reviewed and stable  Pain management: adequate  Airway patency: patent    PONV status at discharge: No PONV  Anesthetic complications: no      Cardiovascular status: blood pressure returned to baseline  Respiratory status: unassisted, spontaneous ventilation and room air  Hydration status: euvolemic  Follow-up not needed.              Vitals Value Taken Time   /72 07/08/25 11:30   Temp 36.1 °C (97 °F) 07/08/25 11:00   Pulse 47 07/08/25 11:30   Resp 45 07/08/25 11:30   SpO2 99 % 07/08/25 11:30         Event Time   Out of Recovery 11:36:35         Pain/Juhi Score: Juhi Score: 10 (7/8/2025 11:20 AM)

## 2025-07-08 NOTE — ANESTHESIA PREPROCEDURE EVALUATION
07/08/2025  Tex Pickard III is a 69 y.o., male.    Anesthesia Evaluation    I have reviewed the Patient Summary Reports.     I have reviewed the Nursing Notes. I have reviewed the NPO Status.   I have reviewed the Medications.     Review of Systems  Anesthesia Hx:  No problems with previous Anesthesia                Social:  Former Smoker Smoking Status: Former Smoker  Smokeless Tobacco Status: Former User  Alcohol use: No  Drug use: No          Cardiovascular:  Exercise tolerance: good    Denies Hypertension.   Denies MI.  Denies CAD.    Denies CABG/stent.    Denies Angina.        ECG has been reviewed.                            Pulmonary:    Denies COPD.  Denies Asthma.    Denies Recent URI. Sleep Apnea, CPAP           Education provided regarding risk of obstructive sleep apnea            Renal/:   Denies Chronic Renal Disease.  BPH              Hepatic/GI:  Bowel Prep.    Denies GERD. Denies Liver Disease.               Neurological:  Denies TIA.  Denies CVA.    Denies Seizures.                                Endocrine:  Denies Diabetes. Denies Hypothyroidism.        Obesity / BMI > 30  Psych:  Denies Psychiatric History.                Physical Exam  General:  Obesity       Airway/Jaw/Neck:  Airway Findings: Mouth Opening: Normal     Tongue: Normal      General Airway Assessment: Adult, Good      Mallampati: II  Improves to II with phonation.  TM Distance: 4-6 cm               Dental:  Dental Findings: In tact      Chest/Lungs:  Chest/Lungs Findings:  Clear to auscultation, Normal Respiratory Rate       Heart/Vascular:  Heart Findings: Rate: Normal  Rhythm: Regular Rhythm  Sounds: Normal  Heart murmur: negative                     Mental Status:  Mental Status Findings:  Cooperative, Alert and Oriented         Anesthesia Plan  Type of Anesthesia, risks & benefits discussed:  Anesthesia Type:   general    Patient's Preference:   Plan Factors:          Intra-op Monitoring Plan: standard ASA monitors  Intra-op Monitoring Plan Comments:   Post Op Pain Control Plan:   Post Op Pain Control Plan Comments:     Induction:   IV  Beta Blocker:  Patient is not currently on a Beta-Blocker (No further documentation required).       Informed Consent: Informed consent signed with the Patient and all parties understand the risks and agree with anesthesia plan.  All questions answered.  Anesthesia consent signed with patient.  ASA Score: 2     Day of Surgery Review of History & Physical: I have interviewed and examined the patient. I have reviewed the patient's H&P dated:  There are no significant changes.            Ready For Surgery From Anesthesia Perspective.           Physical Exam  General: Obesity    Airway:  Mallampati: II / II  Mouth Opening: Normal  TM Distance: 4-6 cm  Tongue: Normal    Dental:  In tact    Chest/Lungs:  Clear to auscultation, Normal Respiratory Rate    Heart:  Rate: Normal  Rhythm: Regular Rhythm  Sounds: Normal      Anesthesia Plan  Type of Anesthesia, risks & benefits discussed:    Anesthesia Type: general  Intra-op Monitoring Plan: standard ASA monitors  Induction:  IV  Informed Consent: Informed consent signed with the Patient and all parties understand the risks and agree with anesthesia plan.  All questions answered.   ASA Score: 2  Day of Surgery Review of History & Physical: I have interviewed and examined the patient. I have reviewed the patient's H&P dated:     Ready For Surgery From Anesthesia Perspective.     .

## 2025-07-08 NOTE — TRANSFER OF CARE
"Anesthesia Transfer of Care Note    Patient: Tex Pickard III    Procedure(s) Performed: Procedure(s) (LRB):  COLONOSCOPY, SCREENING, HIGH RISK PATIENT (N/A)    Patient location: PACU    Anesthesia Type: general    Transport from OR: Transported from OR on room air with adequate spontaneous ventilation    Post pain: adequate analgesia    Post assessment: no apparent anesthetic complications and tolerated procedure well    Post vital signs: stable    Level of consciousness: awake    Nausea/Vomiting: no nausea/vomiting    Complications: none    Transfer of care protocol was followed      Last vitals: Visit Vitals  BP (!) 145/70 (BP Location: Left arm, Patient Position: Sitting)   Pulse (!) 50   Temp 36.1 °C (97 °F) (Skin)   Resp 15   Ht 5' 11" (1.803 m)   Wt 108.9 kg (240 lb)   SpO2 98%   BMI 33.47 kg/m²     "

## 2025-07-08 NOTE — PROVATION PATIENT INSTRUCTIONS
Discharge Summary/Instructions for after Colonoscopy with   Biopsy/Polypectomy  Tex Pickard    Tuesday, July 8, 2025  To Martinez MD  RESTRICTIONS ON ACTIVITY:  - Do not drive a car or operate machinery until the day after the procedure.      - The following day: return to full activity including work.  - For  3 days: No heavy lifting, straining or running.  - Diet: You can have solid foods, but no gassy foods (i.e. beans, broccoli,   cabbage, etc).  TREATMENT FOR COMMON SIDE EFFECTS:  - Mild abdominal pain and bloating or excessive gas: rest, eat lightly and   use a heating pad.  SYMPTOMS TO WATCH FOR AND REPORT TO YOUR PHYSICIAN:  1. Severe abdominal pain.  2. Fever within 24 hours after a procedure.  3. A large amount of rectal bleeding. (A small amount of blood from the   rectum is not serious, especially if hemorrhoids are present.  3.  Because air was put into your colon during the procedure, expelling   large amounts of air from your rectum is normal.  4.  You may not have a bowel movement for 1-3 days because of the   colonoscopy prep.  This is normal.  5.  Call immediately if you notice any of the following:   Chills and/or fever over 101   Persistent vomiting   Severe abdominal pain, other than gas cramps   Severe chest pain   Black, tarry stools   Any bleeding - exceeding one tablespoon  Your doctor recommends these additional instructions:  You are being discharged to home.   Eat a high fiber diet.   Take a fiber supplement, for example Citrucel, Fibercon, Konsyl or   Metamucil.   Take Miralax 1 capful (17 grams) in 8 ounces of water by mouth once a day as   needed.   Your physician has recommended a repeat colonoscopy in 5 years for   surveillance.  None  If you have any questions or problems, please call your physician.  EMERGENCY PHONE NUMBER: [phone]  LAB RESULTS: Call in two (2) weeks for lab results, [phone]  ___________________________________________  Nurse  Signature  ___________________________________________  Patient/Designated Responsible Party Signature  To Martinez MD  7/8/2025 11:15:09 AM  This report has been verified and signed electronically.  Dear patient,  As a result of recent federal legislation (The Federal Cures Act), you may   receive lab or pathology results from your procedure in your [facility]   account before your physician is able to contact you. Your physician or   their representative will relay the results to you with their   recommendations at their soonest availability.  Thank you.  PROVATION

## 2025-07-08 NOTE — BRIEF OP NOTE
Discharge Note  Short Stay      SUMMARY     Admit Date: 7/8/2025    Attending Physician: To Martinez Jr., MD     Discharge Physician: To Martinez Jr., MD    Discharge Date: 7/8/2025 11:16 AM    Final Diagnosis: Screening for colon cancer [Z12.11]    Impression:            - Non-bleeding internal hemorrhoids.                          - The rectum and recto-sigmoid colon are normal.                          - Redundant colon.                          - One 1 to 2 mm polyp in the cecum, removed with a                          cold biopsy forceps. Resected and retrieved.                          - The examination was otherwise normal.   Recommendation:        - Discharge patient to home.                          - High fiber diet.                          - Use fiber, for example Citrucel, Fibercon,                          Konsyl or Metamucil.                          - Take a PROBIOTIC, such as a carton of GREEK                          YOGURT (Chobani or Oikos, or Activia or Dannon);                          or tablets of ALIGN or CULTURELLE or LOUIS-Q (all                          non-prescription), every day for a month.                          - Miralax 1 capful (17 grams) in 8 ounces of water                          PO daily PRN.                          - Repeat colonoscopy in 5 years for surveillance.                          - Continue present medications.                          - Patient has a contact number available for                          emergencies. The signs and symptoms of potential                          delayed complications were discussed with the                          patient. Return to normal activities tomorrow.                          Written discharge instructions were provided to                          the patient.                          - Return to normal activities tomorrow.   To Martinez MD   7/8/2025       Disposition: HOME OR SELF CARE    Patient  Instructions:   Current Discharge Medication List        CONTINUE these medications which have NOT CHANGED    Details   aspirin 81 MG Chew Take 81 mg by mouth once daily.      BYSTOLIC 20 mg Tab 20 mg.       finasteride (PROPECIA) 1 mg tablet Take 1 mg by mouth once daily.  Refills: 3      losartan (COZAAR) 25 MG tablet       meloxicam (MOBIC) 15 MG tablet TAKE 1 TABLET(15 MG) BY MOUTH ONCE DAILY  Qty: 30 tablet, Refills: 2    Associated Diagnoses: Cervical facet syndrome; Myofascial neck pain      omeprazole (PRILOSEC) 40 MG capsule Take 1 capsule (40 mg total) by mouth once daily.  Qty: 30 capsule, Refills: 11      pravastatin (PRAVACHOL) 10 MG tablet Take 10 mg by mouth once daily.       cholecalciferol, vitamin D3, 125 mcg (5,000 unit) capsule Take 1 capsule (5,000 Units total) by mouth twice a week.      cyanocobalamin, vitamin B-12, (VITAMIN B-12) 2,500 mcg Subl Place 2,500 mcg under the tongue every other day.  Refills: 0      cyclobenzaprine (FLEXERIL) 5 MG tablet TAKE 1 TABLET(5 MG) BY MOUTH TWICE DAILY AS NEEDED FOR MUSCLE SPASMS  Qty: 60 tablet, Refills: 01    Associated Diagnoses: Myofascial pain      fluticasone propionate (FLONASE) 50 mcg/actuation nasal spray 1 spray by Each Nostril route once daily.      gabapentin (NEURONTIN) 600 MG tablet Take 600 mg by mouth 2 (two) times daily.      gluc willis/chondro willis A/vit C/Mn (GLUCOSAMINE 1500 COMPLEX ORAL) Take by mouth.      multivitamin capsule Take 1 capsule by mouth once daily.      multivitamin-iron-folic acid Tab Take by mouth.      ondansetron (ZOFRAN-ODT) 8 MG TbDL Take 1 tablet (8 mg total) by mouth 3 (three) times daily.  Qty: 30 tablet, Refills: 1      oxymetazoline (AFRIN) 0.05 % nasal spray 2 sprays by Nasal route nightly as needed.       SLOW RELEASE IRON 144 mg (45 mg iron) TbSR Take 1 tablet with vitamin C 250 mg daily      testosterone cypionate (DEPOTESTOTERONE CYPIONATE) 200 mg/mL injection 0.8 ml injection weekly  Qty: 10 mL, Refills: 5     Associated Diagnoses: Testosterone deficiency             Discharge Procedure Orders (must include Diet, Follow-up, Activity)    Follow Up:  Follow up with PCP as per your routine.  Please follow a high fiber diet.  Activity as tolerated.    No driving day of procedure.

## 2025-07-09 VITALS
HEART RATE: 47 BPM | DIASTOLIC BLOOD PRESSURE: 72 MMHG | TEMPERATURE: 97 F | HEIGHT: 71 IN | WEIGHT: 240 LBS | SYSTOLIC BLOOD PRESSURE: 137 MMHG | RESPIRATION RATE: 45 BRPM | OXYGEN SATURATION: 99 % | BODY MASS INDEX: 33.6 KG/M2

## (undated) DEVICE — SEE MEDLINE ITEM 152514

## (undated) DEVICE — FORCEP STRAIGHT DISP

## (undated) DEVICE — SYR GLASS 5CC LUER LOK

## (undated) DEVICE — NDL 27G X 1 1/4

## (undated) DEVICE — GLOVE PROTEXIS LTX MICRO  7.5

## (undated) DEVICE — SUT ETHILON 4-0 PS2 18 BLK

## (undated) DEVICE — APPLICATOR CHLORAPREP ORN 26ML

## (undated) DEVICE — DRAPE STERI-DRAPE 1000 17X11IN

## (undated) DEVICE — TOWEL OR DISP STRL BLUE 4/PK

## (undated) DEVICE — SYR 10CC LUER LOCK

## (undated) DEVICE — DRESSING XEROFORM FOIL PK 1X8

## (undated) DEVICE — SEE MEDLINE ITEM 146313

## (undated) DEVICE — NDL SPINAL 25GX3.5 SPINOCAN

## (undated) DEVICE — CORD BIPOLAR 12 FOOT

## (undated) DEVICE — SUT 3-0 VICRYL / SH (J416)

## (undated) DEVICE — GLOVE 7.5 PROTEXIS PI MICRO

## (undated) DEVICE — TRAY NERVE BLOCK

## (undated) DEVICE — HANDLE SURG LIGHT NONRIGID

## (undated) DEVICE — Device

## (undated) DEVICE — APPLICATOR CHLORAPREP CLR 10.5

## (undated) DEVICE — SEE MEDLINE ITEM 152622

## (undated) DEVICE — TOURNIQUET SB QC DP 18X4IN

## (undated) DEVICE — GLOVE PROTEXIS LTX MICRO 8

## (undated) DEVICE — ALCOHOL 70% ISOP RUBBING 4OZ

## (undated) DEVICE — SEE MEDLINE ITEM 157173

## (undated) DEVICE — DRAPE PLASTIC U 60X72

## (undated) DEVICE — GAUZE SPONGE 4X4 12PLY

## (undated) DEVICE — SEE L#120831

## (undated) DEVICE — SEE MEDLINE ITEM 157128

## (undated) DEVICE — SEE MEDLINE ITEM 157131

## (undated) DEVICE — SEE MEDLINE ITEM 152487

## (undated) DEVICE — SOL SOD CHLORIDE 0.9% 10ML

## (undated) DEVICE — SUT PROLENE 4-0 MONO 18IN

## (undated) DEVICE — BANDAGE ESMARK LATEX FREE 4INX

## (undated) DEVICE — PAD CAST SPECIALIST STRL 3